# Patient Record
Sex: MALE | Race: WHITE | NOT HISPANIC OR LATINO | Employment: FULL TIME | URBAN - METROPOLITAN AREA
[De-identification: names, ages, dates, MRNs, and addresses within clinical notes are randomized per-mention and may not be internally consistent; named-entity substitution may affect disease eponyms.]

---

## 2019-11-23 ENCOUNTER — OFFICE VISIT (OUTPATIENT)
Dept: URGENT CARE | Facility: CLINIC | Age: 29
End: 2019-11-23
Payer: COMMERCIAL

## 2019-11-23 VITALS
BODY MASS INDEX: 22.96 KG/M2 | WEIGHT: 164 LBS | HEIGHT: 71 IN | OXYGEN SATURATION: 97 % | TEMPERATURE: 98.4 F | SYSTOLIC BLOOD PRESSURE: 119 MMHG | RESPIRATION RATE: 16 BRPM | HEART RATE: 103 BPM | DIASTOLIC BLOOD PRESSURE: 75 MMHG

## 2019-11-23 DIAGNOSIS — L21.9 SEBORRHEIC DERMATITIS: Primary | ICD-10-CM

## 2019-11-23 PROCEDURE — 99203 OFFICE O/P NEW LOW 30 MIN: CPT | Performed by: PHYSICIAN ASSISTANT

## 2019-11-23 RX ORDER — SERTRALINE HYDROCHLORIDE 100 MG/1
100 TABLET, FILM COATED ORAL DAILY
COMMUNITY

## 2019-11-23 NOTE — PROGRESS NOTES
[employer] [facility]        NAME: Basil Lennon is a 30 y o  male  : 1990    MRN: [id number]  DATE: 2019  TIME: 12:10 PM    Assessment and Plan   Seborrheic dermatitis [L21 9]  1  Seborrheic dermatitis           Patient Instructions     Discussed condition with pt  Resembles seborrheic dermatitis of the eyelids vs contact dermatitis to unknown trigger  He has no significant periorbital edema  I rec antihistamine for the itching, Selsun Blue gentle scrubs 2-3 times weekly, using pure soap on the face, avoiding recent new facial wash, and avoiding excessively hot water in the shower  Should condition persist/worsen, then he should be reevaluated  Follow up with PCP in 3-5 days  Proceed to  ER if symptoms worsen  Chief Complaint     Chief Complaint   Patient presents with    Eye Problem      elijah orbial  edema of left eye dry scaling redness rt eye with skin peeling  got zylene in  rt eye 9 days ago also had allergy to dog dander          History of Present Illness       Pt presents with a several day history of what he reports is redness, itching, irritation, and scaly skin over his eyelids  He denies any eye drainage or redness, crusting, photophobia, visual changes  He reports about one month ago, he used a new facial wash but has otherwise not changed anything in his environment  He has not tried taking anything or applying anything topically to this point  Review of Systems   Review of Systems   Constitutional: Negative  Eyes:        Red, dry, scaly, itchy eyelids    Respiratory: Negative  Cardiovascular: Negative  Gastrointestinal: Negative  Genitourinary: Negative            Current Medications       Current Outpatient Medications:     sertraline (ZOLOFT) 100 mg tablet, Take 100 mg by mouth daily, Disp: , Rfl:     Current Allergies     Allergies as of 2019    (No Known Allergies)            The following portions of the patient's history were reviewed and updated as appropriate: allergies, current medications, past family history, past medical history, past social history, past surgical history and problem list      No past medical history on file  No past surgical history on file  No family history on file  Medications have been verified  Objective   /75   Pulse 103   Temp 98 4 °F (36 9 °C)   Resp 16   Ht 5' 10 5" (1 791 m)   Wt 74 4 kg (164 lb)   SpO2 97%   BMI 23 20 kg/m²        Physical Exam     Physical Exam   Constitutional: He is oriented to person, place, and time  He appears well-developed and well-nourished  No distress  Eyes:   B/L upper and lower lids are dry, scaly, red, with peeling skin but no significant periorbital edema noted  Conjunctiva WNL  No crusting on lid margins  No photophobia  Neurological: He is alert and oriented to person, place, and time  Psychiatric: He has a normal mood and affect  Vitals reviewed

## 2022-04-29 ENCOUNTER — HOSPITAL ENCOUNTER (EMERGENCY)
Facility: HOSPITAL | Age: 32
Discharge: HOME | End: 2022-04-30
Attending: EMERGENCY MEDICINE
Payer: COMMERCIAL

## 2022-04-29 DIAGNOSIS — F10.929 ACUTE ALCOHOL INTOXICATION (HCC): ICD-10-CM

## 2022-04-29 DIAGNOSIS — R45.851 SUICIDAL IDEATION: Primary | ICD-10-CM

## 2022-04-29 LAB
ALBUMIN SERPL BCP-MCNC: 4 G/DL (ref 3.5–5)
ALP SERPL-CCNC: 80 U/L (ref 46–116)
ALT SERPL W P-5'-P-CCNC: 28 U/L (ref 12–78)
AMPHETAMINES SERPL QL SCN: NEGATIVE
ANION GAP SERPL CALCULATED.3IONS-SCNC: 13 MMOL/L (ref 4–13)
APAP SERPL-MCNC: <2 UG/ML (ref 10–20)
AST SERPL W P-5'-P-CCNC: 24 U/L (ref 5–45)
BARBITURATES UR QL: NEGATIVE
BASOPHILS # BLD AUTO: 0.04 THOUSANDS/ΜL (ref 0–0.1)
BASOPHILS NFR BLD AUTO: 1 % (ref 0–1)
BENZODIAZ UR QL: NEGATIVE
BILIRUB SERPL-MCNC: 0.21 MG/DL (ref 0.2–1)
BILIRUB UR QL STRIP: NEGATIVE
BUN SERPL-MCNC: 12 MG/DL (ref 5–25)
CALCIUM SERPL-MCNC: 8.4 MG/DL (ref 8.3–10.1)
CHLORIDE SERPL-SCNC: 107 MMOL/L (ref 100–108)
CLARITY UR: CLEAR
CO2 SERPL-SCNC: 22 MMOL/L (ref 21–32)
COCAINE UR QL: NEGATIVE
COLOR UR: COLORLESS
CREAT SERPL-MCNC: 1.21 MG/DL (ref 0.6–1.3)
EOSINOPHIL # BLD AUTO: 0.06 THOUSAND/ΜL (ref 0–0.61)
EOSINOPHIL NFR BLD AUTO: 1 % (ref 0–6)
ERYTHROCYTE [DISTWIDTH] IN BLOOD BY AUTOMATED COUNT: 13 % (ref 11.6–15.1)
ETHANOL SERPL-MCNC: 319 MG/DL (ref 0–3)
GFR SERPL CREATININE-BSD FRML MDRD: 78 ML/MIN/1.73SQ M
GLUCOSE SERPL-MCNC: 86 MG/DL (ref 65–140)
GLUCOSE UR STRIP-MCNC: NEGATIVE MG/DL
HCT VFR BLD AUTO: 43.1 % (ref 36.5–49.3)
HGB BLD-MCNC: 15 G/DL (ref 12–17)
HGB UR QL STRIP.AUTO: NEGATIVE
IMM GRANULOCYTES # BLD AUTO: 0.01 THOUSAND/UL (ref 0–0.2)
IMM GRANULOCYTES NFR BLD AUTO: 0 % (ref 0–2)
KETONES UR STRIP-MCNC: NEGATIVE MG/DL
LEUKOCYTE ESTERASE UR QL STRIP: NEGATIVE
LYMPHOCYTES # BLD AUTO: 2.65 THOUSANDS/ΜL (ref 0.6–4.47)
LYMPHOCYTES NFR BLD AUTO: 36 % (ref 14–44)
MCH RBC QN AUTO: 31.4 PG (ref 26.8–34.3)
MCHC RBC AUTO-ENTMCNC: 34.8 G/DL (ref 31.4–37.4)
MCV RBC AUTO: 90 FL (ref 82–98)
METHADONE UR QL: NEGATIVE
MONOCYTES # BLD AUTO: 0.55 THOUSAND/ΜL (ref 0.17–1.22)
MONOCYTES NFR BLD AUTO: 7 % (ref 4–12)
NEUTROPHILS # BLD AUTO: 4.08 THOUSANDS/ΜL (ref 1.85–7.62)
NEUTS SEG NFR BLD AUTO: 55 % (ref 43–75)
NITRITE UR QL STRIP: NEGATIVE
NRBC BLD AUTO-RTO: 0 /100 WBCS
OPIATES UR QL SCN: NEGATIVE
OXYCODONE+OXYMORPHONE UR QL SCN: NEGATIVE
PCP UR QL: NEGATIVE
PH UR STRIP.AUTO: 5.5 [PH]
PLATELET # BLD AUTO: 235 THOUSANDS/UL (ref 149–390)
PMV BLD AUTO: 9.3 FL (ref 8.9–12.7)
POTASSIUM SERPL-SCNC: 3.8 MMOL/L (ref 3.5–5.3)
PROT SERPL-MCNC: 7.7 G/DL (ref 6.4–8.2)
PROT UR STRIP-MCNC: NEGATIVE MG/DL
RBC # BLD AUTO: 4.78 MILLION/UL (ref 3.88–5.62)
SODIUM SERPL-SCNC: 142 MMOL/L (ref 136–145)
SP GR UR STRIP.AUTO: <=1.005 (ref 1–1.03)
THC UR QL: NEGATIVE
UROBILINOGEN UR QL STRIP.AUTO: 0.2 E.U./DL
WBC # BLD AUTO: 7.39 THOUSAND/UL (ref 4.31–10.16)

## 2022-04-29 PROCEDURE — 85025 COMPLETE CBC W/AUTO DIFF WBC: CPT | Performed by: EMERGENCY MEDICINE

## 2022-04-29 PROCEDURE — 80053 COMPREHEN METABOLIC PANEL: CPT | Performed by: EMERGENCY MEDICINE

## 2022-04-29 PROCEDURE — 36415 COLL VENOUS BLD VENIPUNCTURE: CPT

## 2022-04-29 PROCEDURE — 82077 ASSAY SPEC XCP UR&BREATH IA: CPT | Performed by: EMERGENCY MEDICINE

## 2022-04-29 PROCEDURE — 80143 DRUG ASSAY ACETAMINOPHEN: CPT | Performed by: EMERGENCY MEDICINE

## 2022-04-29 PROCEDURE — 99285 EMERGENCY DEPT VISIT HI MDM: CPT

## 2022-04-29 PROCEDURE — 80307 DRUG TEST PRSMV CHEM ANLYZR: CPT | Performed by: EMERGENCY MEDICINE

## 2022-04-29 PROCEDURE — 99285 EMERGENCY DEPT VISIT HI MDM: CPT | Performed by: EMERGENCY MEDICINE

## 2022-04-30 VITALS
TEMPERATURE: 97.8 F | WEIGHT: 181.6 LBS | HEART RATE: 80 BPM | DIASTOLIC BLOOD PRESSURE: 52 MMHG | RESPIRATION RATE: 20 BRPM | SYSTOLIC BLOOD PRESSURE: 95 MMHG | BODY MASS INDEX: 25.69 KG/M2 | OXYGEN SATURATION: 97 %

## 2022-04-30 NOTE — ED NOTES
Patient provided a ginger ale as requested, calm and cooperative     Courtney Newton, SALVADOR  04/30/22 7315

## 2022-04-30 NOTE — ED NOTES
Pt is a 33 y o  male  Who was brought to the ED by his mother per pt's request   Pt was intoxicated and stated "I just want to fucking die " Pt held overnight to sober up and await crisis eval for possible SI  Pt presented as calm and cooperative  He denied SI/HI/Hallucinations  He stated "I was really drunk and upset, of course I said I wanted to die, but I don't want to kill myself, and I don't really want to die  I just felt like shit  People say that all the time "  Pt stated that he has been living in a sober living home in [place], [place]  He had come to spend the weekend with his parents  Pt reported that he has been struggling with alcoholism for the past 13 years but is "only a binge drinker " Pt states he has been hospitalized in the past for detox only and never for psychiatric issues  Attempted collateral information from pt's mother but was unable to get through  Pt does not appear to be an immediate risk to himself nor others at this time and is therefore cleared for d/c by Maria E Alcantar MA  Crisis Intervention Worker  04/30/22 11:41 AM

## 2022-04-30 NOTE — ED CARE HANDOFF
Emergency Department Sign Out Note        Sign out and transfer of care from [facility]  See Separate Emergency Department note  The patient, Nayeli Phillips, was evaluated by the previous provider for alcohol intox and suicidality  Workup Completed:  Results Reviewed     Procedure Component Value Units Date/Time    Acetaminophen level-"If concentration is detectable, please discuss with medical  on call " [[id number]]  (Abnormal) Collected: 04/29/22 2022    Lab Status: Final result Specimen: Blood from Arm, Right Updated: 04/29/22 2100     Acetaminophen Level <2 ug/mL     Rapid drug screen, urine [[id number]]  (Normal) Collected: 04/29/22 2023    Lab Status: Final result Specimen: Urine, Clean Catch Updated: 04/29/22 2046     Amph/Meth UR Negative     Barbiturate Ur Negative     Benzodiazepine Urine Negative     Cocaine Urine Negative     Methadone Urine Negative     Opiate Urine Negative     PCP Ur Negative     THC Urine Negative     Oxycodone Urine Negative    Narrative:      FOR MEDICAL PURPOSES ONLY  IF CONFIRMATION NEEDED PLEASE CONTACT THE LAB WITHIN 5 DAYS      Drug Screen Cutoff Levels:  AMPHETAMINE/METHAMPHETAMINES  1000 ng/mL  BARBITURATES     200 ng/mL  BENZODIAZEPINES     200 ng/mL  COCAINE      300 ng/mL  METHADONE      300 ng/mL  OPIATES      300 ng/mL  PHENCYCLIDINE     25 ng/mL  THC       50 ng/mL  OXYCODONE      100 ng/mL    Ethanol [[id number]]  (Abnormal) Collected: 04/29/22 2022    Lab Status: Final result Specimen: Blood from Arm, Right Updated: 04/29/22 2046     Ethanol Lvl 319 mg/dL     Comprehensive metabolic panel [[id number]] Collected: 04/29/22 2022    Lab Status: Final result Specimen: Blood from Arm, Right Updated: 04/29/22 2045     Sodium 142 mmol/L      Potassium 3 8 mmol/L      Chloride 107 mmol/L      CO2 22 mmol/L      ANION GAP 13 mmol/L      BUN 12 mg/dL      Creatinine 1 21 mg/dL      Glucose 86 mg/dL      Calcium 8 4 mg/dL      AST 24 U/L      ALT 28 U/L      Alkaline Phosphatase 80 U/L      Total Protein 7 7 g/dL      Albumin 4 0 g/dL      Total Bilirubin 0 21 mg/dL      eGFR 78 ml/min/1 73sq m     Narrative:      [employer] guidelines for Chronic Kidney Disease (CKD):     Stage 1 with normal or high GFR (GFR > 90 mL/min/1 73 square meters)    Stage 2 Mild CKD (GFR = 60-89 mL/min/1 73 square meters)    Stage 3A Moderate CKD (GFR = 45-59 mL/min/1 73 square meters)    Stage 3B Moderate CKD (GFR = 30-44 mL/min/1 73 square meters)    Stage 4 Severe CKD (GFR = 15-29 mL/min/1 73 square meters)    Stage 5 End Stage CKD (GFR <15 mL/min/1 73 square meters)  Note: GFR calculation is accurate only with a steady state creatinine    UA (URINE) with reflex to Scope [[id number]] Collected: 04/29/22 2023    Lab Status: Final result Specimen: Urine, Clean Catch Updated: 04/29/22 2030     Color, UA Colorless     Clarity, UA Clear     Specific Gravity, UA <=1 005     pH, UA 5 5     Leukocytes, UA Negative     Nitrite, UA Negative     Protein, UA Negative mg/dl      Glucose, UA Negative mg/dl      Ketones, UA Negative mg/dl      Urobilinogen, UA 0 2 E U /dl      Bilirubin, UA Negative     Blood, UA Negative    CBC and differential [[id number]] Collected: 04/29/22 2022    Lab Status: Final result Specimen: Blood from Arm, Right Updated: 04/29/22 2030     WBC 7 39 Thousand/uL      RBC 4 78 Million/uL      Hemoglobin 15 0 g/dL      Hematocrit 43 1 %      MCV 90 fL      MCH 31 4 pg      MCHC 34 8 g/dL      RDW 13 0 %      MPV 9 3 fL      Platelets 123 Thousands/uL      nRBC 0 /100 WBCs      Neutrophils Relative 55 %      Immat GRANS % 0 %      Lymphocytes Relative 36 %      Monocytes Relative 7 %      Eosinophils Relative 1 %      Basophils Relative 1 %      Neutrophils Absolute 4 08 Thousands/µL      Immature Grans Absolute 0 01 Thousand/uL      Lymphocytes Absolute 2 65 Thousands/µL      Monocytes Absolute 0 55 Thousand/µL      Eosinophils Absolute 0 06 Thousand/µL Basophils Absolute 0 04 Thousands/µL             No orders to display         ED Course / Workup Pending (followup): Patient sobered through the night  I talk to him in the morning  He says SI usually accompanies alcohol intox  He reports he can't stop drinking  He's still willing to speak to a crisis worker  ED Course as of 04/30/22 0654   Fri Apr 29, 2022   2228 Sober and d/c vs crisis   Sat Apr 30, 2022   3314 FG called     Procedures  MDM    Patient is medically clear for psychiatric evaluation    Disposition  Final diagnoses:   None     ED Disposition     None      Follow-up Information    None       Patient's Medications   Discharge Prescriptions    No medications on file     No discharge procedures on file         ED Provider  Electronically Signed by     Lawanna Apley, MD  04/30/22 200 [facility] Angelina Bell MD  04/30/22 4014

## 2022-04-30 NOTE — ED CARE HANDOFF
Emergency Department Sign Out Note        Sign out and transfer of care from previous provider  Alcohol sobriety  Crisis evaluated the patient,denied any suicidal or self harm ideation or intention  Was AAOX4 and competent  Declined any services for alcohol rehab  Discharged  Procedures  MDM        Disposition  Final diagnoses:   Suicidal ideation   Acute alcohol intoxication ([address] )     Time reflects when diagnosis was documented in both MDM as applicable and the Disposition within this note     Time User Action Codes Description Comment    4/30/2022  9:55 AM Anepu, Waldron Dubin Add [R45 851] Suicidal ideation     4/30/2022  9:55 AM Anepu, Waldron Dubin Add [F10 929] Acute alcohol intoxication [facility])       ED Disposition     ED Disposition Condition Date/Time Comment    Discharge Stable Sat Apr 30, 2022  9:55 AM Basil Lennon discharge to home/self care  Follow-up Information     Follow up With Specialties Details Why Contact Info Additional Information    Mallika Gallagher MD  Schedule an appointment as soon as possible for a visit   [address] Emergency Department Emergency Medicine  If symptoms worsen [address] [place] [address] Emergency Department, [place], [place], [address]        Patient's Medications   Discharge Prescriptions    No medications on file     No discharge procedures on file         ED Provider  Electronically Signed by     Manpreet Payne DO  04/30/22 5578

## 2022-04-30 NOTE — ED NOTES
Pt resting in bed VSS no medical complaints at this time  When asked if he was suicidal or any thoughts of harming himself pt responded "I don't know" pt cooperatively ambulated to secure holding area   1:1 watch remains in place for safety      Ana Clements RN  04/29/22 4503

## 2022-04-30 NOTE — ED PROVIDER NOTES
History  Chief Complaint   Patient presents with    Alcohol Intoxication     Patient in bathroom , waited outside door until patient exits and ambulates to triage  Patient had a vodka bottle in his jacket that was leaking onto floor  He states his mother dropped him off, that he was sober for 6 days living in a " sober house "  States that was his third bottle of vodka today  Crying " I just want to fucking die "  Patient is taken to hallway bed 1 after vodka bottle leaking   Psychiatric Evaluation     68-year-old male presents stating that he is suicidal that his life is not going well however he also was intoxicated  States he has been drinking all afternoon  No fevers chills no other complaints  No other complaints patient states he has been this room many times and is familiar with this way it works  History provided by:  Patient   used: No        Prior to Admission Medications   Prescriptions Last Dose Informant Patient Reported? Taking?   sertraline (ZOLOFT) 100 mg tablet   Yes No   Sig: Take 100 mg by mouth daily      Facility-Administered Medications: None       Past Medical History:   Diagnosis Date    ETOH abuse        History reviewed  No pertinent surgical history  History reviewed  No pertinent family history  I have reviewed and agree with the history as documented  E-Cigarette/Vaping    E-Cigarette Use Never User      E-Cigarette/Vaping Substances     Social History     Tobacco Use    Smoking status: Current Some Day Smoker    Smokeless tobacco: Never Used   Vaping Use    Vaping Use: Never used   Substance Use Topics    Alcohol use: Yes     Alcohol/week: 6 0 standard drinks     Types: 2 Glasses of wine, 4 Cans of beer per week     Comment: 2 pints of vodka or more with binge drinking     Drug use: Never       Review of Systems   Constitutional: Negative for activity change, chills, diaphoresis and fever     HENT: Negative for congestion, ear pain, nosebleeds, sore throat, trouble swallowing and voice change  Eyes: Negative for pain, discharge and redness  Respiratory: Negative for apnea, cough, choking, shortness of breath, wheezing and stridor  Cardiovascular: Negative for chest pain and palpitations  Gastrointestinal: Negative for abdominal distention, abdominal pain, constipation, diarrhea, nausea and vomiting  Endocrine: Negative for polydipsia  Genitourinary: Negative for difficulty urinating, dysuria, flank pain, frequency, hematuria and urgency  Musculoskeletal: Negative for back pain, gait problem, joint swelling, myalgias, neck pain and neck stiffness  Skin: Negative for pallor and rash  Neurological: Negative for dizziness, tremors, syncope, speech difficulty, weakness, numbness and headaches  Hematological: Negative for adenopathy  Psychiatric/Behavioral: Positive for suicidal ideas  Negative for confusion, hallucinations and self-injury  The patient is not nervous/anxious  Physical Exam  Physical Exam  Vitals and nursing note reviewed  Constitutional:       General: He is not in acute distress  Appearance: He is well-developed  He is not diaphoretic  HENT:      Head: Normocephalic and atraumatic  Right Ear: External ear normal       Left Ear: External ear normal       Nose: Nose normal    Eyes:      Conjunctiva/sclera: Conjunctivae normal       Pupils: Pupils are equal, round, and reactive to light  Cardiovascular:      Rate and Rhythm: Normal rate and regular rhythm  Heart sounds: Normal heart sounds  Pulmonary:      Effort: Pulmonary effort is normal       Breath sounds: Normal breath sounds  Abdominal:      General: Bowel sounds are normal       Palpations: Abdomen is soft  Musculoskeletal:         General: Normal range of motion  Cervical back: Normal range of motion and neck supple  Skin:     General: Skin is warm and dry     Neurological:      Mental Status: He is alert and oriented to person, place, and time  Deep Tendon Reflexes: Reflexes are normal and symmetric  Psychiatric:         Behavior: Behavior is cooperative  Vital Signs  ED Triage Vitals [04/29/22 1957]   Temperature Pulse Respirations Blood Pressure SpO2   (!) 97 2 °F (36 2 °C) 103 20 134/73 98 %      Temp Source Heart Rate Source Patient Position - Orthostatic VS BP Location FiO2 (%)   Tympanic Monitor Lying Right arm --      Pain Score       --           Vitals:    04/29/22 1957 04/29/22 2108 04/30/22 0624 04/30/22 0909   BP: 134/73 121/56 106/64 95/52   Pulse: 103 92 72 80   Patient Position - Orthostatic VS: Lying Lying  Lying         Visual Acuity      ED Medications  Medications - No data to display    Diagnostic Studies  Results Reviewed     Procedure Component Value Units Date/Time    Acetaminophen level-"If concentration is detectable, please discuss with medical  on call " [[id number]]  (Abnormal) Collected: 04/29/22 2022    Lab Status: Final result Specimen: Blood from Arm, Right Updated: 04/29/22 2100     Acetaminophen Level <2 ug/mL     Rapid drug screen, urine [[id number]]  (Normal) Collected: 04/29/22 2023    Lab Status: Final result Specimen: Urine, Clean Catch Updated: 04/29/22 2046     Amph/Meth UR Negative     Barbiturate Ur Negative     Benzodiazepine Urine Negative     Cocaine Urine Negative     Methadone Urine Negative     Opiate Urine Negative     PCP Ur Negative     THC Urine Negative     Oxycodone Urine Negative    Narrative:      FOR MEDICAL PURPOSES ONLY  IF CONFIRMATION NEEDED PLEASE CONTACT THE LAB WITHIN 5 DAYS      Drug Screen Cutoff Levels:  AMPHETAMINE/METHAMPHETAMINES  1000 ng/mL  BARBITURATES     200 ng/mL  BENZODIAZEPINES     200 ng/mL  COCAINE      300 ng/mL  METHADONE      300 ng/mL  OPIATES      300 ng/mL  PHENCYCLIDINE     25 ng/mL  THC       50 ng/mL  OXYCODONE      100 ng/mL    Ethanol [[id number]]  (Abnormal) Collected: 04/29/22 2022    Lab Status: Final result Specimen: Blood from Arm, Right Updated: 04/29/22 2046     Ethanol Lvl 319 mg/dL     Comprehensive metabolic panel [[id number]] Collected: 04/29/22 2022    Lab Status: Final result Specimen: Blood from Arm, Right Updated: 04/29/22 2045     Sodium 142 mmol/L      Potassium 3 8 mmol/L      Chloride 107 mmol/L      CO2 22 mmol/L      ANION GAP 13 mmol/L      BUN 12 mg/dL      Creatinine 1 21 mg/dL      Glucose 86 mg/dL      Calcium 8 4 mg/dL      AST 24 U/L      ALT 28 U/L      Alkaline Phosphatase 80 U/L      Total Protein 7 7 g/dL      Albumin 4 0 g/dL      Total Bilirubin 0 21 mg/dL      eGFR 78 ml/min/1 73sq m     Narrative:      [place] guidelines for Chronic Kidney Disease (CKD):     Stage 1 with normal or high GFR (GFR > 90 mL/min/1 73 square meters)    Stage 2 Mild CKD (GFR = 60-89 mL/min/1 73 square meters)    Stage 3A Moderate CKD (GFR = 45-59 mL/min/1 73 square meters)    Stage 3B Moderate CKD (GFR = 30-44 mL/min/1 73 square meters)    Stage 4 Severe CKD (GFR = 15-29 mL/min/1 73 square meters)    Stage 5 End Stage CKD (GFR <15 mL/min/1 73 square meters)  Note: GFR calculation is accurate only with a steady state creatinine    UA (URINE) with reflex to Scope [[id number]] Collected: 04/29/22 2023    Lab Status: Final result Specimen: Urine, Clean Catch Updated: 04/29/22 2030     Color, UA Colorless     Clarity, UA Clear     Specific Gravity, UA <=1 005     pH, UA 5 5     Leukocytes, UA Negative     Nitrite, UA Negative     Protein, UA Negative mg/dl      Glucose, UA Negative mg/dl      Ketones, UA Negative mg/dl      Urobilinogen, UA 0 2 E U /dl      Bilirubin, UA Negative     Blood, UA Negative    CBC and differential [[id number]] Collected: 04/29/22 2022    Lab Status: Final result Specimen: Blood from Arm, Right Updated: 04/29/22 2030     WBC 7 39 Thousand/uL      RBC 4 78 Million/uL      Hemoglobin 15 0 g/dL      Hematocrit 43 1 %      MCV 90 fL      MCH 31 4 pg MCHC 34 8 g/dL      RDW 13 0 %      MPV 9 3 fL      Platelets 833 Thousands/uL      nRBC 0 /100 WBCs      Neutrophils Relative 55 %      Immat GRANS % 0 %      Lymphocytes Relative 36 %      Monocytes Relative 7 %      Eosinophils Relative 1 %      Basophils Relative 1 %      Neutrophils Absolute 4 08 Thousands/µL      Immature Grans Absolute 0 01 Thousand/uL      Lymphocytes Absolute 2 65 Thousands/µL      Monocytes Absolute 0 55 Thousand/µL      Eosinophils Absolute 0 06 Thousand/µL      Basophils Absolute 0 04 Thousands/µL                  No orders to display              Procedures  Procedures         ED Course                                             MDM    Disposition  Final diagnoses:   Suicidal ideation   Acute alcohol intoxication ([facility] [address] )     Time reflects when diagnosis was documented in both MDM as applicable and the Disposition within this note     Time User Action Codes Description Comment    4/30/2022  9:55 AM Denice Thornton Add [R45 851] Suicidal ideation     4/30/2022  9:55 AM Anepu, Mattie Apley Add [F10 929] Acute alcohol intoxication [facility])       ED Disposition     ED Disposition Condition Date/Time Comment    Discharge Stable Sat Apr 30, 2022  9:55 AM Adonis Goldmann discharge to home/self care              Follow-up Information     Follow up With Specialties Details Why Contact Info Additional Information    Enrike Nowak MD  Schedule an appointment as soon as possible for a visit   [address] Emergency Department Emergency Medicine  If symptoms worsen [address] [address] Emergency Department, [facility], [facility]          Discharge Medication List as of 4/30/2022  9:56 AM      CONTINUE these medications which have NOT CHANGED    Details   sertraline (ZOLOFT) 100 mg tablet Take 100 mg by mouth daily, Historical Med             No discharge procedures on file      PDMP Review     None          ED Provider  Electronically Signed by           Ivett Henriquez DO  05/02/22 0848

## 2023-06-13 ENCOUNTER — APPOINTMENT (EMERGENCY)
Dept: CT IMAGING | Facility: HOSPITAL | Age: 33
DRG: 754 | End: 2023-06-13
Payer: COMMERCIAL

## 2023-06-13 ENCOUNTER — HOSPITAL ENCOUNTER (INPATIENT)
Facility: HOSPITAL | Age: 33
LOS: 3 days | DRG: 754 | End: 2023-06-17
Attending: SURGERY | Admitting: INTERNAL MEDICINE
Payer: COMMERCIAL

## 2023-06-13 DIAGNOSIS — F32.A ANXIETY AND DEPRESSION: ICD-10-CM

## 2023-06-13 DIAGNOSIS — F41.9 ANXIETY AND DEPRESSION: ICD-10-CM

## 2023-06-13 DIAGNOSIS — T14.91XA SUICIDE ATTEMPT (HCC): Primary | ICD-10-CM

## 2023-06-13 DIAGNOSIS — T78.2XXA ANAPHYLAXIS, INITIAL ENCOUNTER: ICD-10-CM

## 2023-06-13 DIAGNOSIS — T07.XXXA ABRASIONS OF MULTIPLE SITES: ICD-10-CM

## 2023-06-13 DIAGNOSIS — F10.929 ALCOHOL INTOXICATION (HCC): ICD-10-CM

## 2023-06-13 LAB
ANION GAP SERPL CALCULATED.3IONS-SCNC: 9 MMOL/L (ref 4–13)
ANION GAP SERPL CALCULATED.3IONS-SCNC: 9 MMOL/L (ref 4–13)
BASOPHILS # BLD AUTO: 0.03 THOUSANDS/ÂΜL (ref 0–0.1)
BASOPHILS # BLD AUTO: 0.03 THOUSANDS/ÂΜL (ref 0–0.1)
BASOPHILS NFR BLD AUTO: 0 % (ref 0–1)
BASOPHILS NFR BLD AUTO: 0 % (ref 0–1)
BUN SERPL-MCNC: 12 MG/DL (ref 5–25)
BUN SERPL-MCNC: 12 MG/DL (ref 5–25)
CALCIUM SERPL-MCNC: 8.6 MG/DL (ref 8.4–10.2)
CALCIUM SERPL-MCNC: 8.6 MG/DL (ref 8.4–10.2)
CHLORIDE SERPL-SCNC: 109 MMOL/L (ref 96–108)
CHLORIDE SERPL-SCNC: 109 MMOL/L (ref 96–108)
CO2 SERPL-SCNC: 25 MMOL/L (ref 21–32)
CO2 SERPL-SCNC: 25 MMOL/L (ref 21–32)
CREAT SERPL-MCNC: 1.28 MG/DL (ref 0.6–1.3)
CREAT SERPL-MCNC: 1.28 MG/DL (ref 0.6–1.3)
EOSINOPHIL # BLD AUTO: 0.12 THOUSAND/ÂΜL (ref 0–0.61)
EOSINOPHIL # BLD AUTO: 0.12 THOUSAND/ÂΜL (ref 0–0.61)
EOSINOPHIL NFR BLD AUTO: 2 % (ref 0–6)
EOSINOPHIL NFR BLD AUTO: 2 % (ref 0–6)
ERYTHROCYTE [DISTWIDTH] IN BLOOD BY AUTOMATED COUNT: 13.3 % (ref 11.6–15.1)
ERYTHROCYTE [DISTWIDTH] IN BLOOD BY AUTOMATED COUNT: 13.3 % (ref 11.6–15.1)
ETHANOL SERPL-MCNC: 227 MG/DL
ETHANOL SERPL-MCNC: 227 MG/DL
GFR SERPL CREATININE-BSD FRML MDRD: 72 ML/MIN/1.73SQ M
GFR SERPL CREATININE-BSD FRML MDRD: 72 ML/MIN/1.73SQ M
GLUCOSE SERPL-MCNC: 84 MG/DL (ref 65–140)
GLUCOSE SERPL-MCNC: 84 MG/DL (ref 65–140)
HCT VFR BLD AUTO: 43.6 % (ref 36.5–49.3)
HCT VFR BLD AUTO: 43.6 % (ref 36.5–49.3)
HGB BLD-MCNC: 15.1 G/DL (ref 12–17)
HGB BLD-MCNC: 15.1 G/DL (ref 12–17)
IMM GRANULOCYTES # BLD AUTO: 0.02 THOUSAND/UL (ref 0–0.2)
IMM GRANULOCYTES # BLD AUTO: 0.02 THOUSAND/UL (ref 0–0.2)
IMM GRANULOCYTES NFR BLD AUTO: 0 % (ref 0–2)
IMM GRANULOCYTES NFR BLD AUTO: 0 % (ref 0–2)
LYMPHOCYTES # BLD AUTO: 2.26 THOUSANDS/ÂΜL (ref 0.6–4.47)
LYMPHOCYTES # BLD AUTO: 2.26 THOUSANDS/ÂΜL (ref 0.6–4.47)
LYMPHOCYTES NFR BLD AUTO: 31 % (ref 14–44)
LYMPHOCYTES NFR BLD AUTO: 31 % (ref 14–44)
MCH RBC QN AUTO: 30.3 PG (ref 26.8–34.3)
MCH RBC QN AUTO: 30.3 PG (ref 26.8–34.3)
MCHC RBC AUTO-ENTMCNC: 34.6 G/DL (ref 31.4–37.4)
MCHC RBC AUTO-ENTMCNC: 34.6 G/DL (ref 31.4–37.4)
MCV RBC AUTO: 88 FL (ref 82–98)
MCV RBC AUTO: 88 FL (ref 82–98)
MONOCYTES # BLD AUTO: 0.48 THOUSAND/ÂΜL (ref 0.17–1.22)
MONOCYTES # BLD AUTO: 0.48 THOUSAND/ÂΜL (ref 0.17–1.22)
MONOCYTES NFR BLD AUTO: 7 % (ref 4–12)
MONOCYTES NFR BLD AUTO: 7 % (ref 4–12)
NEUTROPHILS # BLD AUTO: 4.29 THOUSANDS/ÂΜL (ref 1.85–7.62)
NEUTROPHILS # BLD AUTO: 4.29 THOUSANDS/ÂΜL (ref 1.85–7.62)
NEUTS SEG NFR BLD AUTO: 60 % (ref 43–75)
NEUTS SEG NFR BLD AUTO: 60 % (ref 43–75)
NRBC BLD AUTO-RTO: 0 /100 WBCS
NRBC BLD AUTO-RTO: 0 /100 WBCS
PLATELET # BLD AUTO: 198 THOUSANDS/UL (ref 149–390)
PLATELET # BLD AUTO: 198 THOUSANDS/UL (ref 149–390)
PMV BLD AUTO: 9.3 FL (ref 8.9–12.7)
PMV BLD AUTO: 9.3 FL (ref 8.9–12.7)
POTASSIUM SERPL-SCNC: 4 MMOL/L (ref 3.5–5.3)
POTASSIUM SERPL-SCNC: 4 MMOL/L (ref 3.5–5.3)
RBC # BLD AUTO: 4.98 MILLION/UL (ref 3.88–5.62)
RBC # BLD AUTO: 4.98 MILLION/UL (ref 3.88–5.62)
SODIUM SERPL-SCNC: 143 MMOL/L (ref 135–147)
SODIUM SERPL-SCNC: 143 MMOL/L (ref 135–147)
WBC # BLD AUTO: 7.2 THOUSAND/UL (ref 4.31–10.16)
WBC # BLD AUTO: 7.2 THOUSAND/UL (ref 4.31–10.16)

## 2023-06-13 PROCEDURE — 76705 ECHO EXAM OF ABDOMEN: CPT | Performed by: PHYSICIAN ASSISTANT

## 2023-06-13 PROCEDURE — 70450 CT HEAD/BRAIN W/O DYE: CPT

## 2023-06-13 PROCEDURE — 82947 ASSAY GLUCOSE BLOOD QUANT: CPT

## 2023-06-13 PROCEDURE — 84295 ASSAY OF SERUM SODIUM: CPT

## 2023-06-13 PROCEDURE — 76604 US EXAM CHEST: CPT | Performed by: PHYSICIAN ASSISTANT

## 2023-06-13 PROCEDURE — 99205 OFFICE O/P NEW HI 60 MIN: CPT | Performed by: SURGERY

## 2023-06-13 PROCEDURE — 71260 CT THORAX DX C+: CPT

## 2023-06-13 PROCEDURE — 93308 TTE F-UP OR LMTD: CPT | Performed by: PHYSICIAN ASSISTANT

## 2023-06-13 PROCEDURE — 84132 ASSAY OF SERUM POTASSIUM: CPT

## 2023-06-13 PROCEDURE — 90471 IMMUNIZATION ADMIN: CPT

## 2023-06-13 PROCEDURE — 99285 EMERGENCY DEPT VISIT HI MDM: CPT

## 2023-06-13 PROCEDURE — 82330 ASSAY OF CALCIUM: CPT

## 2023-06-13 PROCEDURE — 72125 CT NECK SPINE W/O DYE: CPT

## 2023-06-13 PROCEDURE — 85014 HEMATOCRIT: CPT

## 2023-06-13 PROCEDURE — 80048 BASIC METABOLIC PNL TOTAL CA: CPT | Performed by: SURGERY

## 2023-06-13 PROCEDURE — 82077 ASSAY SPEC XCP UR&BREATH IA: CPT | Performed by: SURGERY

## 2023-06-13 PROCEDURE — 85025 COMPLETE CBC W/AUTO DIFF WBC: CPT | Performed by: SURGERY

## 2023-06-13 PROCEDURE — 82803 BLOOD GASES ANY COMBINATION: CPT

## 2023-06-13 PROCEDURE — 99291 CRITICAL CARE FIRST HOUR: CPT | Performed by: EMERGENCY MEDICINE

## 2023-06-13 PROCEDURE — 74177 CT ABD & PELVIS W/CONTRAST: CPT

## 2023-06-13 PROCEDURE — 36415 COLL VENOUS BLD VENIPUNCTURE: CPT | Performed by: SURGERY

## 2023-06-13 RX ADMIN — IOHEXOL 100 ML: 350 INJECTION, SOLUTION INTRAVENOUS at 23:17

## 2023-06-13 NOTE — LETTER
[facility]  FLOOR MED SURG UNIT  [address] [facility] [address]  Dept: [phone]    June 22, 2023     Patient: Martha Soni   YOB: 1990   Date of Visit: 6/13/2023       To Whom it May Concern:    Sonja Lamar is under my professional care  He was seen in the hospital from 6/13/2023 to 6/17/2023  He may return to school on 6/23/2023 without limitations  If you have any questions or concerns, please don't hesitate to call           Sincerely,          Drew Hyman, DO

## 2023-06-14 PROBLEM — T50.995A: Status: ACTIVE | Noted: 2023-06-14

## 2023-06-14 PROBLEM — F32.A ANXIETY AND DEPRESSION: Status: ACTIVE | Noted: 2023-06-14

## 2023-06-14 PROBLEM — F10.20 ETOH DEPENDENCE (HCC): Status: ACTIVE | Noted: 2023-06-14

## 2023-06-14 PROBLEM — F41.9 ANXIETY AND DEPRESSION: Status: ACTIVE | Noted: 2023-06-14

## 2023-06-14 LAB
AMPHETAMINES SERPL QL SCN: NEGATIVE
AMPHETAMINES SERPL QL SCN: NEGATIVE
BARBITURATES UR QL: NEGATIVE
BARBITURATES UR QL: NEGATIVE
BENZODIAZ UR QL: NEGATIVE
BENZODIAZ UR QL: NEGATIVE
COCAINE UR QL: NEGATIVE
COCAINE UR QL: NEGATIVE
ETHANOL EXG-MCNC: 0.11 MG/DL
ETHANOL EXG-MCNC: 0.11 MG/DL
HCT VFR BLD AUTO: 37.6 % (ref 36.5–49.3)
HCT VFR BLD AUTO: 37.6 % (ref 36.5–49.3)
HCT VFR BLD AUTO: 42.1 % (ref 36.5–49.3)
HCT VFR BLD AUTO: 42.1 % (ref 36.5–49.3)
HGB BLD-MCNC: 12.7 G/DL (ref 12–17)
HGB BLD-MCNC: 12.7 G/DL (ref 12–17)
HGB BLD-MCNC: 14.4 G/DL (ref 12–17)
HGB BLD-MCNC: 14.4 G/DL (ref 12–17)
HOLD SPECIMEN: NORMAL
HOLD SPECIMEN: NORMAL
METHADONE UR QL: NEGATIVE
METHADONE UR QL: NEGATIVE
OPIATES UR QL SCN: NEGATIVE
OPIATES UR QL SCN: NEGATIVE
OXYCODONE+OXYMORPHONE UR QL SCN: NEGATIVE
OXYCODONE+OXYMORPHONE UR QL SCN: NEGATIVE
PCP UR QL: NEGATIVE
PCP UR QL: NEGATIVE
THC UR QL: NEGATIVE
THC UR QL: NEGATIVE

## 2023-06-14 PROCEDURE — 96375 TX/PRO/DX INJ NEW DRUG ADDON: CPT

## 2023-06-14 PROCEDURE — 96361 HYDRATE IV INFUSION ADD-ON: CPT

## 2023-06-14 PROCEDURE — 99232 SBSQ HOSP IP/OBS MODERATE 35: CPT | Performed by: INTERNAL MEDICINE

## 2023-06-14 PROCEDURE — 90715 TDAP VACCINE 7 YRS/> IM: CPT | Performed by: PHYSICIAN ASSISTANT

## 2023-06-14 PROCEDURE — 96372 THER/PROPH/DIAG INJ SC/IM: CPT

## 2023-06-14 PROCEDURE — 36415 COLL VENOUS BLD VENIPUNCTURE: CPT | Performed by: EMERGENCY MEDICINE

## 2023-06-14 PROCEDURE — 80307 DRUG TEST PRSMV CHEM ANLYZR: CPT | Performed by: EMERGENCY MEDICINE

## 2023-06-14 PROCEDURE — 85018 HEMOGLOBIN: CPT | Performed by: EMERGENCY MEDICINE

## 2023-06-14 PROCEDURE — NC001 PR NO CHARGE

## 2023-06-14 PROCEDURE — 82075 ASSAY OF BREATH ETHANOL: CPT | Performed by: EMERGENCY MEDICINE

## 2023-06-14 PROCEDURE — 96374 THER/PROPH/DIAG INJ IV PUSH: CPT

## 2023-06-14 PROCEDURE — NC001 PR NO CHARGE: Performed by: EMERGENCY MEDICINE

## 2023-06-14 PROCEDURE — 85014 HEMATOCRIT: CPT | Performed by: EMERGENCY MEDICINE

## 2023-06-14 RX ORDER — CHLORHEXIDINE GLUCONATE 0.12 MG/ML
15 RINSE ORAL EVERY 12 HOURS SCHEDULED
Status: DISCONTINUED | OUTPATIENT
Start: 2023-06-14 | End: 2023-06-17 | Stop reason: HOSPADM

## 2023-06-14 RX ORDER — BUSPIRONE HYDROCHLORIDE 10 MG/1
10 TABLET ORAL 3 TIMES DAILY
COMMUNITY

## 2023-06-14 RX ORDER — GABAPENTIN 600 MG/1
600 TABLET ORAL 3 TIMES DAILY
COMMUNITY

## 2023-06-14 RX ORDER — QUETIAPINE FUMARATE 50 MG/1
50 TABLET, FILM COATED ORAL
Status: ON HOLD | COMMUNITY
End: 2023-06-16 | Stop reason: SDUPTHER

## 2023-06-14 RX ORDER — SERTRALINE HYDROCHLORIDE 100 MG/1
100 TABLET, FILM COATED ORAL DAILY
COMMUNITY

## 2023-06-14 RX ORDER — SERTRALINE HYDROCHLORIDE 20 MG/ML
100 SOLUTION ORAL DAILY
Status: DISCONTINUED | OUTPATIENT
Start: 2023-06-14 | End: 2023-06-15

## 2023-06-14 RX ORDER — SODIUM CHLORIDE 9 MG/ML
150 INJECTION, SOLUTION INTRAVENOUS CONTINUOUS
Status: DISCONTINUED | OUTPATIENT
Start: 2023-06-14 | End: 2023-06-16

## 2023-06-14 RX ORDER — ENOXAPARIN SODIUM 100 MG/ML
40 INJECTION SUBCUTANEOUS DAILY
Status: DISCONTINUED | OUTPATIENT
Start: 2023-06-14 | End: 2023-06-17 | Stop reason: HOSPADM

## 2023-06-14 RX ORDER — EPINEPHRINE 1 MG/ML
0.5 INJECTION, SOLUTION, CONCENTRATE INTRAVENOUS ONCE
Status: COMPLETED | OUTPATIENT
Start: 2023-06-14 | End: 2023-06-14

## 2023-06-14 RX ORDER — QUETIAPINE FUMARATE 25 MG/1
50 TABLET, FILM COATED ORAL
Status: DISCONTINUED | OUTPATIENT
Start: 2023-06-14 | End: 2023-06-17 | Stop reason: HOSPADM

## 2023-06-14 RX ORDER — BUPROPION HYDROCHLORIDE 150 MG/1
300 TABLET ORAL DAILY
Status: DISCONTINUED | OUTPATIENT
Start: 2023-06-14 | End: 2023-06-17 | Stop reason: HOSPADM

## 2023-06-14 RX ORDER — FAMOTIDINE 10 MG/ML
20 INJECTION, SOLUTION INTRAVENOUS ONCE
Status: COMPLETED | OUTPATIENT
Start: 2023-06-14 | End: 2023-06-14

## 2023-06-14 RX ORDER — GINSENG 100 MG
1 CAPSULE ORAL 2 TIMES DAILY
Status: DISCONTINUED | OUTPATIENT
Start: 2023-06-14 | End: 2023-06-17 | Stop reason: HOSPADM

## 2023-06-14 RX ORDER — METHYLPREDNISOLONE SODIUM SUCCINATE 125 MG/2ML
125 INJECTION, POWDER, LYOPHILIZED, FOR SOLUTION INTRAMUSCULAR; INTRAVENOUS ONCE
Status: COMPLETED | OUTPATIENT
Start: 2023-06-14 | End: 2023-06-14

## 2023-06-14 RX ORDER — OXYCODONE HCL 5 MG/5 ML
2.5 SOLUTION, ORAL ORAL EVERY 4 HOURS PRN
Status: DISCONTINUED | OUTPATIENT
Start: 2023-06-14 | End: 2023-06-15

## 2023-06-14 RX ORDER — ACETAMINOPHEN 325 MG/1
975 TABLET ORAL EVERY 8 HOURS SCHEDULED
Status: DISCONTINUED | OUTPATIENT
Start: 2023-06-14 | End: 2023-06-17 | Stop reason: HOSPADM

## 2023-06-14 RX ORDER — DIPHENHYDRAMINE HYDROCHLORIDE 50 MG/ML
50 INJECTION INTRAMUSCULAR; INTRAVENOUS ONCE
Status: COMPLETED | OUTPATIENT
Start: 2023-06-14 | End: 2023-06-14

## 2023-06-14 RX ORDER — GABAPENTIN 300 MG/1
600 CAPSULE ORAL 3 TIMES DAILY
Status: DISCONTINUED | OUTPATIENT
Start: 2023-06-14 | End: 2023-06-17 | Stop reason: HOSPADM

## 2023-06-14 RX ORDER — OXYCODONE HCL 5 MG/5 ML
5 SOLUTION, ORAL ORAL EVERY 4 HOURS PRN
Status: DISCONTINUED | OUTPATIENT
Start: 2023-06-14 | End: 2023-06-15

## 2023-06-14 RX ORDER — ONDANSETRON 2 MG/ML
4 INJECTION INTRAMUSCULAR; INTRAVENOUS EVERY 6 HOURS PRN
Status: DISCONTINUED | OUTPATIENT
Start: 2023-06-14 | End: 2023-06-17 | Stop reason: HOSPADM

## 2023-06-14 RX ORDER — SODIUM CHLORIDE, SODIUM GLUCONATE, SODIUM ACETATE, POTASSIUM CHLORIDE, MAGNESIUM CHLORIDE, SODIUM PHOSPHATE, DIBASIC, AND POTASSIUM PHOSPHATE .53; .5; .37; .037; .03; .012; .00082 G/100ML; G/100ML; G/100ML; G/100ML; G/100ML; G/100ML; G/100ML
1000 INJECTION, SOLUTION INTRAVENOUS ONCE
Status: COMPLETED | OUTPATIENT
Start: 2023-06-14 | End: 2023-06-14

## 2023-06-14 RX ORDER — BUSPIRONE HYDROCHLORIDE 5 MG/1
10 TABLET ORAL 3 TIMES DAILY
Status: DISCONTINUED | OUTPATIENT
Start: 2023-06-14 | End: 2023-06-17 | Stop reason: HOSPADM

## 2023-06-14 RX ORDER — BUPROPION HYDROCHLORIDE 300 MG/1
300 TABLET ORAL DAILY
COMMUNITY

## 2023-06-14 RX ADMIN — SERTRALINE HYDROCHLORIDE 100 MG: 20 SOLUTION ORAL at 10:03

## 2023-06-14 RX ADMIN — OXYCODONE HYDROCHLORIDE 5 MG: 5 SOLUTION ORAL at 17:47

## 2023-06-14 RX ADMIN — BUPROPION HYDROCHLORIDE 300 MG: 150 TABLET, FILM COATED, EXTENDED RELEASE ORAL at 08:36

## 2023-06-14 RX ADMIN — SODIUM CHLORIDE 1000 ML: 0.9 INJECTION, SOLUTION INTRAVENOUS at 01:51

## 2023-06-14 RX ADMIN — FAMOTIDINE 20 MG: 10 INJECTION, SOLUTION INTRAVENOUS at 03:45

## 2023-06-14 RX ADMIN — BACITRACIN 1 LARGE APPLICATION: 500 OINTMENT TOPICAL at 17:50

## 2023-06-14 RX ADMIN — BUSPIRONE HYDROCHLORIDE 10 MG: 5 TABLET ORAL at 08:36

## 2023-06-14 RX ADMIN — OXYCODONE HYDROCHLORIDE 5 MG: 5 SOLUTION ORAL at 08:37

## 2023-06-14 RX ADMIN — EPINEPHRINE 0.5 MG: 1 INJECTION, SOLUTION, CONCENTRATE INTRAVENOUS at 03:42

## 2023-06-14 RX ADMIN — GABAPENTIN 600 MG: 300 CAPSULE ORAL at 21:44

## 2023-06-14 RX ADMIN — SODIUM CHLORIDE 1000 ML: 0.9 INJECTION, SOLUTION INTRAVENOUS at 03:39

## 2023-06-14 RX ADMIN — METHYLPREDNISOLONE SODIUM SUCCINATE 125 MG: 125 INJECTION, POWDER, FOR SOLUTION INTRAMUSCULAR; INTRAVENOUS at 03:46

## 2023-06-14 RX ADMIN — DIPHENHYDRAMINE HYDROCHLORIDE 50 MG: 50 INJECTION, SOLUTION INTRAMUSCULAR; INTRAVENOUS at 03:45

## 2023-06-14 RX ADMIN — CHLORHEXIDINE GLUCONATE 15 ML: 1.2 SOLUTION ORAL at 08:27

## 2023-06-14 RX ADMIN — ACETAMINOPHEN 975 MG: 325 TABLET, FILM COATED ORAL at 08:36

## 2023-06-14 RX ADMIN — GABAPENTIN 600 MG: 300 CAPSULE ORAL at 00:24

## 2023-06-14 RX ADMIN — BUSPIRONE HYDROCHLORIDE 10 MG: 5 TABLET ORAL at 17:50

## 2023-06-14 RX ADMIN — BACITRACIN 1 LARGE APPLICATION: 500 OINTMENT TOPICAL at 10:03

## 2023-06-14 RX ADMIN — QUETIAPINE FUMARATE 50 MG: 25 TABLET ORAL at 21:44

## 2023-06-14 RX ADMIN — CHLORHEXIDINE GLUCONATE 15 ML: 1.2 SOLUTION ORAL at 21:44

## 2023-06-14 RX ADMIN — OXYCODONE HYDROCHLORIDE 5 MG: 5 SOLUTION ORAL at 21:44

## 2023-06-14 RX ADMIN — SODIUM CHLORIDE, SODIUM GLUCONATE, SODIUM ACETATE, POTASSIUM CHLORIDE, MAGNESIUM CHLORIDE, SODIUM PHOSPHATE, DIBASIC, AND POTASSIUM PHOSPHATE 1000 ML: .53; .5; .37; .037; .03; .012; .00082 INJECTION, SOLUTION INTRAVENOUS at 04:56

## 2023-06-14 RX ADMIN — ACETAMINOPHEN 975 MG: 325 TABLET, FILM COATED ORAL at 17:48

## 2023-06-14 RX ADMIN — GABAPENTIN 600 MG: 300 CAPSULE ORAL at 08:36

## 2023-06-14 RX ADMIN — GABAPENTIN 600 MG: 300 CAPSULE ORAL at 17:49

## 2023-06-14 RX ADMIN — SODIUM CHLORIDE 150 ML/HR: 0.9 INJECTION, SOLUTION INTRAVENOUS at 03:13

## 2023-06-14 RX ADMIN — OXYCODONE HYDROCHLORIDE 5 MG: 5 SOLUTION ORAL at 12:46

## 2023-06-14 RX ADMIN — BUSPIRONE HYDROCHLORIDE 10 MG: 5 TABLET ORAL at 00:53

## 2023-06-14 RX ADMIN — ENOXAPARIN SODIUM 40 MG: 40 INJECTION SUBCUTANEOUS at 08:27

## 2023-06-14 RX ADMIN — SODIUM CHLORIDE 1000 ML: 0.9 INJECTION, SOLUTION INTRAVENOUS at 02:42

## 2023-06-14 RX ADMIN — QUETIAPINE FUMARATE 50 MG: 25 TABLET ORAL at 00:24

## 2023-06-14 RX ADMIN — TETANUS TOXOID, REDUCED DIPHTHERIA TOXOID AND ACELLULAR PERTUSSIS VACCINE, ADSORBED 0.5 ML: 5; 2.5; 8; 8; 2.5 SUSPENSION INTRAMUSCULAR at 01:42

## 2023-06-14 RX ADMIN — BUSPIRONE HYDROCHLORIDE 10 MG: 5 TABLET ORAL at 21:44
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
## 2023-06-14 NOTE — ASSESSMENT & PLAN NOTE
· Takes gabapentin to help manage his alcoholism but, ran out   · Started drinking last night came in ED with bottle of vodka in his bag     · Monitor for withdrawal   · CIWA protocol    · Seizure precautions   · Encourage cessation
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
## 2023-06-14 NOTE — ED PROCEDURE NOTE
PROCEDURE  CriticalCare Time    Date/Time: 6/14/2023 4:50 AM    Performed by: Sonu Taylor MD  Authorized by: Sonu Taylor MD    Critical care provider statement:     Critical care time (minutes):  35    Critical care time was exclusive of:  Separately billable procedures and treating other patients    Critical care was necessary to treat or prevent imminent or life-threatening deterioration of the following conditions:  Shock (anaphylaxis)    Critical care was time spent personally by me on the following activities:  Obtaining history from patient or surrogate, development of treatment plan with patient or surrogate, evaluation of patient's response to treatment, examination of patient, ordering and performing treatments and interventions, ordering and review of laboratory studies, ordering and review of radiographic studies and re-evaluation of patient's condition    I assumed direction of critical care for this patient from another provider in my specialty: no           Sonu Taylor MD  06/14/23 8818
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
## 2023-06-14 NOTE — QUICK NOTE
Patient Name: Lydia Oliver  Patient MRN: [id number]   Date: 06/14/23   Time: 7406  Unit/Bed: ICU 15    Family/Relationship: Mounika Szymanski (Mother)    Location: Bedside    Content of meeting: Update on patient status, plan of care    Family understanding of patient's condition: Good    Comments: None    Time spent: 5 minutes    ANTONIO Esteban
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
## 2023-06-14 NOTE — PLAN OF CARE
Problem: MOBILITY - ADULT  Goal: Maintain or return to baseline ADL function  Description: INTERVENTIONS:  -  Assess patient's ability to carry out ADLs; assess patient's baseline for ADL function and identify physical deficits which impact ability to perform ADLs (bathing, care of mouth/teeth, toileting, grooming, dressing, etc )  - Assess/evaluate cause of self-care deficits   - Assess range of motion  - Assess patient's mobility; develop plan if impaired  - Assess patient's need for assistive devices and provide as appropriate  - Encourage maximum independence but intervene and supervise when necessary  - Involve family in performance of ADLs  - Assess for home care needs following discharge   - Consider OT consult to assist with ADL evaluation and planning for discharge  - Provide patient education as appropriate  6/14/2023 0538 by Oma Penaloza RN  Outcome: Progressing  6/14/2023 0538 by Oma Penaloza RN  Outcome: Progressing  6/14/2023 0538 by Oma Penaloza RN  Outcome: Progressing     Problem: PAIN - ADULT  Goal: Verbalizes/displays adequate comfort level or baseline comfort level  Description: Interventions:  - Encourage patient to monitor pain and request assistance  - Assess pain using appropriate pain scale  - Administer analgesics based on type and severity of pain and evaluate response  - Implement non-pharmacological measures as appropriate and evaluate response  - Consider cultural and social influences on pain and pain management  - Notify physician/advanced practitioner if interventions unsuccessful or patient reports new pain  Outcome: Progressing     Problem: INFECTION - ADULT  Goal: Absence or prevention of progression during hospitalization  Description: INTERVENTIONS:  - Assess and monitor for signs and symptoms of infection  - Monitor lab/diagnostic results  - Monitor all insertion sites, i e  indwelling lines, tubes, and drains  - Monitor endotracheal if appropriate and nasal secretions for changes in amount and color  - [place] appropriate cooling/warming therapies per order  - Administer medications as ordered  - Instruct and encourage patient and family to use good hand hygiene technique  - Identify and instruct in appropriate isolation precautions for identified infection/condition  Outcome: Progressing  Goal: Absence of fever/infection during neutropenic period  Description: INTERVENTIONS:  - Monitor WBC    Outcome: Progressing     Problem: SAFETY ADULT  Goal: Maintain or return to baseline ADL function  Description: INTERVENTIONS:  -  Assess patient's ability to carry out ADLs; assess patient's baseline for ADL function and identify physical deficits which impact ability to perform ADLs (bathing, care of mouth/teeth, toileting, grooming, dressing, etc )  - Assess/evaluate cause of self-care deficits   - Assess range of motion  - Assess patient's mobility; develop plan if impaired  - Assess patient's need for assistive devices and provide as appropriate  - Encourage maximum independence but intervene and supervise when necessary  - Involve family in performance of ADLs  - Assess for home care needs following discharge   - Consider OT consult to assist with ADL evaluation and planning for discharge  - Provide patient education as appropriate  6/14/2023 0538 by Jorgito Fritz RN  Outcome: Progressing  6/14/2023 0538 by Jorgito Fritz RN  Outcome: Progressing  Goal: Maintains/Returns to pre admission functional level  Description: INTERVENTIONS:  - Perform BMAT or MOVE assessment daily    - Set and communicate daily mobility goal to care team and patient/family/caregiver  - Collaborate with rehabilitation services on mobility goals if consulted  - Perform Range of Motion 3 times a day  - Reposition patient every 2 hours    - Dangle patient 3 times a day  - Stand patient 3 times a day  - Ambulate patient 3 times a day  - Out of bed to chair 3 times a day   - Out of bed for meals 3 times a day  - Out of bed for toileting  - Record patient progress and toleration of activity level   6/14/2023 0538 by La Latif RN  Outcome: Progressing  6/14/2023 0538 by La Latif RN  Outcome: Progressing  Goal: Patient will remain free of falls  Description: INTERVENTIONS:  - Educate patient/family on patient safety including physical limitations  - Instruct patient to call for assistance with activity   - Consult OT/PT to assist with strengthening/mobility   - Keep Call bell within reach  - Keep bed low and locked with side rails adjusted as appropriate  - Keep care items and personal belongings within reach  - Initiate and maintain comfort rounds  - Make Fall Risk Sign visible to staff  - Offer Toileting every 2 Hours, in advance of need  - Initiate/Maintain bed alarm  - Apply yellow socks and bracelet for high fall risk patients  - Consider moving patient to room near nurses station  Outcome: Progressing     Problem: DISCHARGE PLANNING  Goal: Discharge to home or other facility with appropriate resources  Description: INTERVENTIONS:  - Identify barriers to discharge w/patient and caregiver  - Arrange for needed discharge resources and transportation as appropriate  - Identify discharge learning needs (meds, wound care, etc )  - Arrange for interpretive services to assist at discharge as needed  - Refer to Case Management Department for coordinating discharge planning if the patient needs post-hospital services based on physician/advanced practitioner order or complex needs related to functional status, cognitive ability, or social support system  Outcome: Progressing     Problem: Knowledge Deficit  Goal: Patient/family/caregiver demonstrates understanding of disease process, treatment plan, medications, and discharge instructions  Description: Complete learning assessment and assess knowledge base    Interventions:  - Provide teaching at level of understanding  - Provide teaching via preferred learning methods  Outcome: Progressing

## 2023-06-14 NOTE — PROGRESS NOTES
Critical Care Interval Transfer Note:    Please refer to progress note from earlier today for full details  Changes made on day of transfer:   · Advance diet, d/c IVF  · Started home medications: Wellbutrin, Buspar, Gabapentin, Seroquel, Zoloft  · Follow up psych consult, will likely see patient tomorrow (6/15)  · Added Tetanus to allergies     Consults: IP CONSULT TO CASE MANAGEMENT  IP CONSULT TO PSYCHIATRY    Recommended to review admission imaging for incidental findings and document in discharge navigator: Chart reviewed, no known incidental findings noted at this time  Discharge Plan: Anticipate discharge in 24-48 hrs to Inpatient psych vs home    Patient seen and evaluated by Critical Care today and deemed to be appropriate for transfer to Med Surg  Spoke to Dr Danica Bucio from [facility] to accept transfer  Critical care can be contacted via [employer] with any questions or concerns

## 2023-06-15 PROBLEM — T78.40XA ALLERGIC REACTION: Status: ACTIVE | Noted: 2023-06-14

## 2023-06-15 LAB
ANION GAP SERPL CALCULATED.3IONS-SCNC: 6 MMOL/L (ref 4–13)
ANION GAP SERPL CALCULATED.3IONS-SCNC: 6 MMOL/L (ref 4–13)
BUN SERPL-MCNC: 15 MG/DL (ref 5–25)
BUN SERPL-MCNC: 15 MG/DL (ref 5–25)
CA-I BLD-SCNC: 1.13 MMOL/L (ref 1.12–1.32)
CA-I BLD-SCNC: 1.13 MMOL/L (ref 1.12–1.32)
CALCIUM SERPL-MCNC: 7.8 MG/DL (ref 8.4–10.2)
CALCIUM SERPL-MCNC: 7.8 MG/DL (ref 8.4–10.2)
CHLORIDE SERPL-SCNC: 110 MMOL/L (ref 96–108)
CHLORIDE SERPL-SCNC: 110 MMOL/L (ref 96–108)
CO2 SERPL-SCNC: 25 MMOL/L (ref 21–32)
CO2 SERPL-SCNC: 25 MMOL/L (ref 21–32)
CREAT SERPL-MCNC: 0.99 MG/DL (ref 0.6–1.3)
CREAT SERPL-MCNC: 0.99 MG/DL (ref 0.6–1.3)
ERYTHROCYTE [DISTWIDTH] IN BLOOD BY AUTOMATED COUNT: 13.2 % (ref 11.6–15.1)
ERYTHROCYTE [DISTWIDTH] IN BLOOD BY AUTOMATED COUNT: 13.2 % (ref 11.6–15.1)
GFR SERPL CREATININE-BSD FRML MDRD: 98 ML/MIN/1.73SQ M
GFR SERPL CREATININE-BSD FRML MDRD: 98 ML/MIN/1.73SQ M
GLUCOSE SERPL-MCNC: 133 MG/DL (ref 65–140)
GLUCOSE SERPL-MCNC: 133 MG/DL (ref 65–140)
HCT VFR BLD AUTO: 37.6 % (ref 36.5–49.3)
HCT VFR BLD AUTO: 37.6 % (ref 36.5–49.3)
HGB BLD-MCNC: 12.6 G/DL (ref 12–17)
HGB BLD-MCNC: 12.6 G/DL (ref 12–17)
MAGNESIUM SERPL-MCNC: 1.9 MG/DL (ref 1.9–2.7)
MAGNESIUM SERPL-MCNC: 1.9 MG/DL (ref 1.9–2.7)
MCH RBC QN AUTO: 29.6 PG (ref 26.8–34.3)
MCH RBC QN AUTO: 29.6 PG (ref 26.8–34.3)
MCHC RBC AUTO-ENTMCNC: 33.5 G/DL (ref 31.4–37.4)
MCHC RBC AUTO-ENTMCNC: 33.5 G/DL (ref 31.4–37.4)
MCV RBC AUTO: 89 FL (ref 82–98)
MCV RBC AUTO: 89 FL (ref 82–98)
PHOSPHATE SERPL-MCNC: 3.5 MG/DL (ref 2.7–4.5)
PHOSPHATE SERPL-MCNC: 3.5 MG/DL (ref 2.7–4.5)
PLATELET # BLD AUTO: 146 THOUSANDS/UL (ref 149–390)
PLATELET # BLD AUTO: 146 THOUSANDS/UL (ref 149–390)
PMV BLD AUTO: 9.5 FL (ref 8.9–12.7)
PMV BLD AUTO: 9.5 FL (ref 8.9–12.7)
POTASSIUM SERPL-SCNC: 3.7 MMOL/L (ref 3.5–5.3)
POTASSIUM SERPL-SCNC: 3.7 MMOL/L (ref 3.5–5.3)
RBC # BLD AUTO: 4.25 MILLION/UL (ref 3.88–5.62)
RBC # BLD AUTO: 4.25 MILLION/UL (ref 3.88–5.62)
SODIUM SERPL-SCNC: 141 MMOL/L (ref 135–147)
SODIUM SERPL-SCNC: 141 MMOL/L (ref 135–147)
WBC # BLD AUTO: 6.8 THOUSAND/UL (ref 4.31–10.16)
WBC # BLD AUTO: 6.8 THOUSAND/UL (ref 4.31–10.16)

## 2023-06-15 PROCEDURE — 85027 COMPLETE CBC AUTOMATED: CPT

## 2023-06-15 PROCEDURE — 99232 SBSQ HOSP IP/OBS MODERATE 35: CPT | Performed by: INTERNAL MEDICINE

## 2023-06-15 PROCEDURE — 99232 SBSQ HOSP IP/OBS MODERATE 35: CPT | Performed by: SURGERY

## 2023-06-15 PROCEDURE — 84100 ASSAY OF PHOSPHORUS: CPT

## 2023-06-15 PROCEDURE — 83735 ASSAY OF MAGNESIUM: CPT

## 2023-06-15 PROCEDURE — 99222 1ST HOSP IP/OBS MODERATE 55: CPT | Performed by: PSYCHIATRY & NEUROLOGY

## 2023-06-15 PROCEDURE — 82330 ASSAY OF CALCIUM: CPT

## 2023-06-15 PROCEDURE — 80048 BASIC METABOLIC PNL TOTAL CA: CPT

## 2023-06-15 RX ORDER — SERTRALINE HYDROCHLORIDE 100 MG/1
100 TABLET, FILM COATED ORAL DAILY
Status: DISCONTINUED | OUTPATIENT
Start: 2023-06-15 | End: 2023-06-17 | Stop reason: HOSPADM

## 2023-06-15 RX ORDER — OXYCODONE HYDROCHLORIDE 5 MG/1
5 TABLET ORAL EVERY 4 HOURS PRN
Status: DISCONTINUED | OUTPATIENT
Start: 2023-06-15 | End: 2023-06-17 | Stop reason: HOSPADM

## 2023-06-15 RX ADMIN — BUPROPION HYDROCHLORIDE 300 MG: 150 TABLET, FILM COATED, EXTENDED RELEASE ORAL at 07:44

## 2023-06-15 RX ADMIN — CHLORHEXIDINE GLUCONATE 15 ML: 1.2 SOLUTION ORAL at 21:29

## 2023-06-15 RX ADMIN — ACETAMINOPHEN 975 MG: 325 TABLET, FILM COATED ORAL at 23:53

## 2023-06-15 RX ADMIN — OXYCODONE HYDROCHLORIDE 5 MG: 5 TABLET ORAL at 18:45

## 2023-06-15 RX ADMIN — ENOXAPARIN SODIUM 40 MG: 40 INJECTION SUBCUTANEOUS at 07:45

## 2023-06-15 RX ADMIN — GABAPENTIN 600 MG: 300 CAPSULE ORAL at 16:46

## 2023-06-15 RX ADMIN — ACETAMINOPHEN 975 MG: 325 TABLET, FILM COATED ORAL at 16:46

## 2023-06-15 RX ADMIN — OXYCODONE HYDROCHLORIDE 5 MG: 5 SOLUTION ORAL at 01:46

## 2023-06-15 RX ADMIN — GABAPENTIN 600 MG: 300 CAPSULE ORAL at 07:45

## 2023-06-15 RX ADMIN — ACETAMINOPHEN 975 MG: 325 TABLET, FILM COATED ORAL at 07:43

## 2023-06-15 RX ADMIN — BACITRACIN 1 LARGE APPLICATION: 500 OINTMENT TOPICAL at 07:43

## 2023-06-15 RX ADMIN — BUSPIRONE HYDROCHLORIDE 10 MG: 5 TABLET ORAL at 21:28

## 2023-06-15 RX ADMIN — FOLIC ACID: 5 INJECTION, SOLUTION INTRAMUSCULAR; INTRAVENOUS; SUBCUTANEOUS at 16:47

## 2023-06-15 RX ADMIN — GABAPENTIN 600 MG: 300 CAPSULE ORAL at 21:28

## 2023-06-15 RX ADMIN — BUSPIRONE HYDROCHLORIDE 10 MG: 5 TABLET ORAL at 16:46

## 2023-06-15 RX ADMIN — SODIUM CHLORIDE 150 ML/HR: 0.9 INJECTION, SOLUTION INTRAVENOUS at 06:43

## 2023-06-15 RX ADMIN — BUSPIRONE HYDROCHLORIDE 10 MG: 5 TABLET ORAL at 07:44

## 2023-06-15 RX ADMIN — OXYCODONE HYDROCHLORIDE 5 MG: 5 TABLET ORAL at 23:06

## 2023-06-15 RX ADMIN — OXYCODONE HYDROCHLORIDE 5 MG: 5 SOLUTION ORAL at 07:42

## 2023-06-15 RX ADMIN — CHLORHEXIDINE GLUCONATE 15 ML: 1.2 SOLUTION ORAL at 07:44

## 2023-06-15 RX ADMIN — QUETIAPINE FUMARATE 50 MG: 25 TABLET ORAL at 21:29

## 2023-06-15 RX ADMIN — OXYCODONE HYDROCHLORIDE 5 MG: 5 TABLET ORAL at 13:52

## 2023-06-15 RX ADMIN — SODIUM CHLORIDE 150 ML/HR: 0.9 INJECTION, SOLUTION INTRAVENOUS at 19:47
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
## 2023-06-15 NOTE — CASE MANAGEMENT
Case Management Assessment & Discharge Planning Note    Patient name Ny Mcgraw  Location ICU 15/ICU 15 MRN [id number]  : 1990 Date 6/15/2023       Current Admission Date: 2023  Current Admission Diagnosis:Suicide attempt [facility])   Patient Active Problem List    Diagnosis Date Noted   • Allergic reaction 2023   • Anxiety and depression 2023   • EtOH dependence ([facility] [address] ) 2023   • Suicide attempt ([facility] [address] ) 2023   • Abrasions of multiple sites 2023      LOS (days): 1  Geometric Mean LOS (GMLOS) (days):   Days to GMLOS:     OBJECTIVE:    Risk of Unplanned Readmission Score: 10 26         Current admission status: Inpatient       Preferred Pharmacy:   PATIENT/FAMILY REPORTS NO PREFERRED PHARMACY  No address on file      Primary Care Provider: Nixon Hernandez MD    Primary Insurance: [address] [place] [address]  Secondary Insurance:     ASSESSMENT:  Readmission Root Cause  30 Day Readmission: No    Patient Information  Admitted from[de-identified] Home  Mental Status: Alert  During Assessment patient was accompanied by: Not accompanied during assessment  Support Systems: Self, Parent  South Bill of Residence: [address] [place] [address] do you live in?: [address] [place] [address] Dr of Daily Living Prior to Admission  Functional Status: Independent  Completes ADLs independently?: Yes  Ambulates independently?: Yes  Does patient use assisted devices?: No  Does patient currently own DME?: No  Does patient have a history of Outpatient Therapy (PT/OT)?: No  Does the patient have a history of Short-Term Rehab?: No  Does patient have a history of C?: No  Does patient currently have [facility]?: No    Patient Information Continued  Income Source: Employed  Does patient have prescription coverage?: Yes  Within the past 12 months, you worried that your food would run out before you got the money to buy more : Never true  Within the past 12 months, the food you bought just didn't last and you didn't have money to get more : Never true  Food insecurity resource given?: No  Does patient receive dialysis treatments?: No  Does patient have a history of substance abuse?: Yes  Historical substance use preference: Alcohol/ETOH  Current Status[de-identified] 156 (201 signed with psychiatry provider today )  Does patient have a history of Mental Health Diagnosis?: Yes (Anxiety, Depression)  Has patient received inpatient treatment related to mental health in the last 2 years?: No    PHQ 2/9 Screening   Reviewed PHQ 2/9 Depression Screening Score?: No    Means of Transportation  Means of Transport to Appts[de-identified] Drives Self  In the past 12 months, has lack of transportation kept you from medical appointments or from getting medications?: No  In the past 12 months, has lack of transportation kept you from meetings, work, or from getting things needed for daily living?: No  Was application for public transport provided?: No    DISCHARGE DETAILS:  Freedom of Choice: Yes  Comments - Freedom of Choice: Patient chose to sign [place]         Is the patient interested in [facility] at discharge?: No    DME Referral Provided  Referral made for DME?: No    Other Referral/Resources/Interventions Provided:  Interventions: Inpatient Behavioral Health  Referral Comments: Referrals sent to [facility] facilities at this time      Would you like to participate in our [address] service program?  : No - Declined    Treatment Team Recommendation: Inpatient Behavioral Health  Discharge Destination Plan[de-identified] Inpatient Behavioral Health  Transport at Discharge : [facility] Transfer, BLS Ambulance

## 2023-06-15 NOTE — ASSESSMENT & PLAN NOTE
"-Pt has long-standing hx anxiety/depression complicated by EtOH abuse    -takes home zoloft 100 qd, wellbutrin 300 qd, buspar 10 TID, seroquel 50 HS    Plan  -Continue home medications  -See further plan under \"suicide attempt\"    "
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
## 2023-06-15 NOTE — PLAN OF CARE
Problem: MOBILITY - ADULT  Goal: Maintain or return to baseline ADL function  Description: INTERVENTIONS:  -  Assess patient's ability to carry out ADLs; assess patient's baseline for ADL function and identify physical deficits which impact ability to perform ADLs (bathing, care of mouth/teeth, toileting, grooming, dressing, etc )  - Assess/evaluate cause of self-care deficits   - Assess range of motion  - Assess patient's mobility; develop plan if impaired  - Assess patient's need for assistive devices and provide as appropriate  - Encourage maximum independence but intervene and supervise when necessary  - Involve family in performance of ADLs  - Assess for home care needs following discharge   - Consider OT consult to assist with ADL evaluation and planning for discharge  - Provide patient education as appropriate  Outcome: Progressing     Problem: MOBILITY - ADULT  Goal: Maintains/Returns to pre admission functional level  Description: INTERVENTIONS:  - Perform BMAT or MOVE assessment daily    - Set and communicate daily mobility goal to care team and patient/family/caregiver  - Collaborate with rehabilitation services on mobility goals if consulted  - Perform Range of Motion  times a day  - Reposition patient every  hours    - Dangle patient  times a day  - Stand patient  times a day  - Ambulate patient  times a day  - Out of bed to chair  times a day   - Out of bed for meals  times a day  - Out of bed for toileting  - Record patient progress and toleration of activity level   Outcome: Progressing     Problem: PAIN - ADULT  Goal: Verbalizes/displays adequate comfort level or baseline comfort level  Description: Interventions:  - Encourage patient to monitor pain and request assistance  - Assess pain using appropriate pain scale  - Administer analgesics based on type and severity of pain and evaluate response  - Implement non-pharmacological measures as appropriate and evaluate response  - Consider cultural and social influences on pain and pain management  - Notify physician/advanced practitioner if interventions unsuccessful or patient reports new pain  Outcome: Progressing     Problem: INFECTION - ADULT  Goal: Absence or prevention of progression during hospitalization  Description: INTERVENTIONS:  - Assess and monitor for signs and symptoms of infection  - Monitor lab/diagnostic results  - Monitor all insertion sites, i e  indwelling lines, tubes, and drains  - Monitor endotracheal if appropriate and nasal secretions for changes in amount and color  - [place] appropriate cooling/warming therapies per order  - Administer medications as ordered  - Instruct and encourage patient and family to use good hand hygiene technique  - Identify and instruct in appropriate isolation precautions for identified infection/condition  Outcome: Progressing  Goal: Absence of fever/infection during neutropenic period  Description: INTERVENTIONS:  - Monitor WBC    Outcome: Progressing     Problem: SAFETY ADULT  Goal: Maintain or return to baseline ADL function  Description: INTERVENTIONS:  -  Assess patient's ability to carry out ADLs; assess patient's baseline for ADL function and identify physical deficits which impact ability to perform ADLs (bathing, care of mouth/teeth, toileting, grooming, dressing, etc )  - Assess/evaluate cause of self-care deficits   - Assess range of motion  - Assess patient's mobility; develop plan if impaired  - Assess patient's need for assistive devices and provide as appropriate  - Encourage maximum independence but intervene and supervise when necessary  - Involve family in performance of ADLs  - Assess for home care needs following discharge   - Consider OT consult to assist with ADL evaluation and planning for discharge  - Provide patient education as appropriate  Outcome: Progressing  Goal: Maintains/Returns to pre admission functional level  Description: INTERVENTIONS:  - Perform BMAT or MOVE assessment daily    - Set and communicate daily mobility goal to care team and patient/family/caregiver  - Collaborate with rehabilitation services on mobility goals if consulted  - Perform Range of Motion  times a day  - Reposition patient every  hours    - Dangle patient  times a day  - Stand patient  times a day  - Ambulate patient  times a day  - Out of bed to chair  times a day   - Out of bed for meals  times a day  - Out of bed for toileting  - Record patient progress and toleration of activity level   Outcome: Progressing  Goal: Patient will remain free of falls  Description: INTERVENTIONS:  - Educate patient/family on patient safety including physical limitations  - Instruct patient to call for assistance with activity   - Consult OT/PT to assist with strengthening/mobility   - Keep Call bell within reach  - Keep bed low and locked with side rails adjusted as appropriate  - Keep care items and personal belongings within reach  - Initiate and maintain comfort rounds  - Make Fall Risk Sign visible to staff  - Offer Toileting every  Hours, in advance of need  - Initiate/Maintain alarm  - Obtain necessary fall risk management equipment:   - Apply yellow socks and bracelet for high fall risk patients  - Consider moving patient to room near nurses station  Outcome: Progressing     Problem: DISCHARGE PLANNING  Goal: Discharge to home or other facility with appropriate resources  Description: INTERVENTIONS:  - Identify barriers to discharge w/patient and caregiver  - Arrange for needed discharge resources and transportation as appropriate  - Identify discharge learning needs (meds, wound care, etc )  - Arrange for interpretive services to assist at discharge as needed  - Refer to Case Management Department for coordinating discharge planning if the patient needs post-hospital services based on physician/advanced practitioner order or complex needs related to functional status, cognitive ability, or social support system  Outcome: Progressing     Problem: Knowledge Deficit  Goal: Patient/family/caregiver demonstrates understanding of disease process, treatment plan, medications, and discharge instructions  Description: Complete learning assessment and assess knowledge base  Interventions:  - Provide teaching at level of understanding  - Provide teaching via preferred learning methods  Outcome: Progressing     Problem: Nutrition/Hydration-ADULT  Goal: Nutrient/Hydration intake appropriate for improving, restoring or maintaining nutritional needs  Description: Monitor and assess patient's nutrition/hydration status for malnutrition  Collaborate with interdisciplinary team and initiate plan and interventions as ordered  Monitor patient's weight and dietary intake as ordered or per policy  Utilize nutrition screening tool and intervene as necessary  Determine patient's food preferences and provide high-protein, high-caloric foods as appropriate       INTERVENTIONS:  - Monitor oral intake, urinary output, labs, and treatment plans  - Assess nutrition and hydration status and recommend course of action  - Evaluate amount of meals eaten  - Assist patient with eating if necessary   - Allow adequate time for meals  - Recommend/ encourage appropriate diets, oral nutritional supplements, and vitamin/mineral supplements  - Order, calculate, and assess calorie counts as needed  - Assess need for intravenous fluids  - Provide nutrition/hydration education as appropriate  - Include patient/family/caregiver in decisions related to nutrition  Outcome: Progressing
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
## 2023-06-15 NOTE — ASSESSMENT & PLAN NOTE
-Pt developed hypotension and then rash roughly 2 5 hours after receiving IV contrast and 3 minutes after receiving Tdap vaccination  -Resolved s/p IVF resuscitation, solu-medrol, IV Benadryl  -Given timeline most likely 2/2 Tdap    Plan  -Continue to monitor vitals  -avoid Tdap vaccinations in the future   Caution w/ other vaccinations and IV contrast
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
## 2023-06-16 LAB
FLUAV RNA RESP QL NAA+PROBE: NEGATIVE
FLUAV RNA RESP QL NAA+PROBE: NEGATIVE
FLUBV RNA RESP QL NAA+PROBE: NEGATIVE
FLUBV RNA RESP QL NAA+PROBE: NEGATIVE
RSV RNA RESP QL NAA+PROBE: NEGATIVE
RSV RNA RESP QL NAA+PROBE: NEGATIVE
SARS-COV-2 RNA RESP QL NAA+PROBE: NEGATIVE
SARS-COV-2 RNA RESP QL NAA+PROBE: NEGATIVE

## 2023-06-16 PROCEDURE — 0241U HB NFCT DS VIR RESP RNA 4 TRGT: CPT

## 2023-06-16 PROCEDURE — 99232 SBSQ HOSP IP/OBS MODERATE 35: CPT | Performed by: INTERNAL MEDICINE

## 2023-06-16 RX ORDER — ONDANSETRON 2 MG/ML
4 INJECTION INTRAMUSCULAR; INTRAVENOUS EVERY 6 HOURS PRN
Refills: 0
Start: 2023-06-16

## 2023-06-16 RX ORDER — ACETAMINOPHEN 325 MG/1
975 TABLET ORAL EVERY 8 HOURS SCHEDULED
Refills: 0
Start: 2023-06-16

## 2023-06-16 RX ORDER — QUETIAPINE FUMARATE 50 MG/1
50 TABLET, FILM COATED ORAL
Qty: 2 TABLET | Refills: 0 | Status: SHIPPED | OUTPATIENT
Start: 2023-06-16 | End: 2023-06-16 | Stop reason: SDUPTHER

## 2023-06-16 RX ORDER — QUETIAPINE FUMARATE 50 MG/1
50 TABLET, FILM COATED ORAL
Qty: 2 TABLET | Refills: 0
Start: 2023-06-16 | End: 2023-07-16

## 2023-06-16 RX ORDER — OXYCODONE HYDROCHLORIDE 5 MG/1
2.5 TABLET ORAL EVERY 4 HOURS PRN
Qty: 6 TABLET | Refills: 0 | Status: SHIPPED | OUTPATIENT
Start: 2023-06-16 | End: 2023-06-17

## 2023-06-16 RX ADMIN — BACITRACIN 1 LARGE APPLICATION: 500 OINTMENT TOPICAL at 08:12

## 2023-06-16 RX ADMIN — CHLORHEXIDINE GLUCONATE 15 ML: 1.2 SOLUTION ORAL at 20:36

## 2023-06-16 RX ADMIN — BUSPIRONE HYDROCHLORIDE 10 MG: 5 TABLET ORAL at 20:37

## 2023-06-16 RX ADMIN — ENOXAPARIN SODIUM 40 MG: 40 INJECTION SUBCUTANEOUS at 08:15

## 2023-06-16 RX ADMIN — BUPROPION HYDROCHLORIDE 300 MG: 150 TABLET, FILM COATED, EXTENDED RELEASE ORAL at 09:02

## 2023-06-16 RX ADMIN — ACETAMINOPHEN 975 MG: 325 TABLET, FILM COATED ORAL at 08:14

## 2023-06-16 RX ADMIN — GABAPENTIN 600 MG: 300 CAPSULE ORAL at 20:36

## 2023-06-16 RX ADMIN — BUSPIRONE HYDROCHLORIDE 10 MG: 5 TABLET ORAL at 08:14

## 2023-06-16 RX ADMIN — ACETAMINOPHEN 975 MG: 325 TABLET, FILM COATED ORAL at 16:16

## 2023-06-16 RX ADMIN — SODIUM CHLORIDE 150 ML/HR: 0.9 INJECTION, SOLUTION INTRAVENOUS at 02:12

## 2023-06-16 RX ADMIN — OXYCODONE HYDROCHLORIDE 5 MG: 5 TABLET ORAL at 05:52

## 2023-06-16 RX ADMIN — OXYCODONE HYDROCHLORIDE 5 MG: 5 TABLET ORAL at 17:02

## 2023-06-16 RX ADMIN — QUETIAPINE FUMARATE 50 MG: 25 TABLET ORAL at 22:12

## 2023-06-16 RX ADMIN — OXYCODONE HYDROCHLORIDE 5 MG: 5 TABLET ORAL at 20:38

## 2023-06-16 RX ADMIN — GABAPENTIN 600 MG: 300 CAPSULE ORAL at 08:14

## 2023-06-16 RX ADMIN — CHLORHEXIDINE GLUCONATE 15 ML: 1.2 SOLUTION ORAL at 08:14

## 2023-06-16 RX ADMIN — BUSPIRONE HYDROCHLORIDE 10 MG: 5 TABLET ORAL at 16:16

## 2023-06-16 RX ADMIN — SERTRALINE HYDROCHLORIDE 100 MG: 50 TABLET ORAL at 08:14

## 2023-06-16 RX ADMIN — BACITRACIN 1 LARGE APPLICATION: 500 OINTMENT TOPICAL at 17:02

## 2023-06-16 RX ADMIN — GABAPENTIN 600 MG: 300 CAPSULE ORAL at 16:16

## 2023-06-16 RX ADMIN — OXYCODONE HYDROCHLORIDE 5 MG: 5 TABLET ORAL at 12:50

## 2023-06-16 RX ADMIN — FOLIC ACID: 5 INJECTION, SOLUTION INTRAMUSCULAR; INTRAVENOUS; SUBCUTANEOUS at 08:12
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
## 2023-06-16 NOTE — INCIDENTAL FINDINGS
The following findings require follow up:  Radiographic finding   Findin mm non obstructing R kidney stone   Follow up required: Please discuss this finding with your PCP   Follow up should be done within Ahmet Garner MD

## 2023-06-16 NOTE — QUICK NOTE
Pt seen at bedside this morning  Resting comfortably, no new complaints  He is medically cleared for transfer to a psychiatric facility pending bed availability

## 2023-06-16 NOTE — CASE MANAGEMENT
Case Management Discharge Planning Note    Patient name Ching Schroeder  Location ICU 15/ICU 15 MRN [id number]  : 1990 Date 2023       Current Admission Date: 2023  Current Admission Diagnosis:Suicide attempt [facility])   Patient Active Problem List    Diagnosis Date Noted   • Allergic reaction 2023   • Anxiety and depression 2023   • EtOH dependence ([facility] [address] ) 2023   • Suicide attempt ([facility] [address] ) 2023   • Abrasions of multiple sites 2023      LOS (days): 2  Geometric Mean LOS (GMLOS) (days):   Days to GMLOS:     OBJECTIVE:  Risk of Unplanned Readmission Score: 10 81         Current admission status: Inpatient   Preferred Pharmacy:   PATIENT/FAMILY REPORTS NO PREFERRED PHARMACY  No address on file      Primary Care Provider: Yulia Woodard MD    Primary Insurance: [address] [place] [address]  Secondary Insurance:     DISCHARGE DETAILS:    Transport at Discharge : [facility]  Dispatcher Contacted: Yes  Number/Name of Dispatcher: Round Trip [phone]  Transported by [employer] and Unit #): LENA  ETA of Transport (Date): 23  ETA of Transport (Time): 1000 (Physician, Nurse, and [address])    [address] Name, [address] : [facility]  Receiving Facility/Agency Phone Number: [phone]    CM spoke with [facility] at [address]  He is aware of 10 a m  transport for tomorrow  He requested auth information is faxed to them  EMILY Hansen confirmed address for admission is: [address] [place] [address]  Pt made aware of d/c tomorrow morning to [address]  Pt requested CM update his mother, Zora Ahumada  CM also contacted pt mother, Zora Ahumada and notified her of transport time for tomorrow to [address]

## 2023-06-16 NOTE — CASE MANAGEMENT
Case Management Progress Note    Patient name Rhina Bai  Location ICU 15/ICU 15 MRN [id number]  : 1990 Date 2023       LOS (days): 2  Geometric Mean LOS (GMLOS) (days):   Days to GMLOS:        OBJECTIVE:        Current admission status: Inpatient  Preferred Pharmacy:   PATIENT/FAMILY REPORTS NO PREFERRED PHARMACY  No address on file      Primary Care Provider: Daisha Fletcher MD    Primary Insurance: [facility]  [facility]  Secondary Insurance:     PROGRESS NOTE:    [facility] [phone] - In order to check bed status clinicals first need to be faxe for their review  Fax: [phone]  YFN [facility] [phone] - left message    [facility] [phone] - No beds    [facility]MAN HANNA [phone] - No beds    [facility] in [address]  Requested clinicals are faxed to [phone]  Clinicals faxed to [facility]UL ALEX and [facility] via Elena

## 2023-06-16 NOTE — CASE MANAGEMENT
Case Management Progress Note    Patient name Theta Gross  Location ICU 15/ICU 15 MRN [id number]  : 1990 Date 2023       LOS (days): 2  Geometric Mean LOS (GMLOS) (days):   Days to GMLOS:        OBJECTIVE:        Current admission status: Inpatient  Preferred Pharmacy:   PATIENT/FAMILY REPORTS NO PREFERRED PHARMACY  No address on file      Primary Care Provider: Hossein Saldivar MD    Primary Insurance: [address] [place] [address]  Secondary Insurance:     PROGRESS NOTE:    Call received from Lady Ang @ [email or web address]  Facility able to accept patient  Accepting Provider: Mikey Neil  NPI: [id number]    [facility]  NPI: [id number]    Lady Ang confirmed patient will need COVID swab to admit to facility and authorization  PRE-CERTIFICATION INFORMATION  Insurance Authorization  Phone call placed to [address] [facility]   Phone number: [phone] option 2, option 2  Spoke to [facility]  5 days approved  Level of care: INPATIENT  SOC: 2023  NRD: 2023  NRW: Adithya Whitlock  @ [phone]  Authorization # [id number]  [place] confirmed that if transportation is unable to be secured before midnight tonight, accepting facility can call to have [facility] updated  Primary CM made aware of above, will follow for transport arrangements

## 2023-06-16 NOTE — PLAN OF CARE
Problem: MOBILITY - ADULT  Goal: Maintain or return to baseline ADL function  Description: INTERVENTIONS:  -  Assess patient's ability to carry out ADLs; assess patient's baseline for ADL function and identify physical deficits which impact ability to perform ADLs (bathing, care of mouth/teeth, toileting, grooming, dressing, etc )  - Assess/evaluate cause of self-care deficits   - Assess range of motion  - Assess patient's mobility; develop plan if impaired  - Assess patient's need for assistive devices and provide as appropriate  - Encourage maximum independence but intervene and supervise when necessary  - Involve family in performance of ADLs  - Assess for home care needs following discharge   - Consider OT consult to assist with ADL evaluation and planning for discharge  - Provide patient education as appropriate  6/16/2023 0043 by Zenon Lopez RN  Outcome: Progressing  6/15/2023 2243 by Zenon Lopez RN  Outcome: Progressing  Goal: Maintains/Returns to pre admission functional level  Description: INTERVENTIONS:  - Perform BMAT or MOVE assessment daily    - Set and communicate daily mobility goal to care team and patient/family/caregiver  - Collaborate with rehabilitation services on mobility goals if consulted  - Perform Range of Motion  times a day  - Reposition patient every  hours    - Dangle patient  times a day  - Stand patient  times a day  - Ambulate patient  times a day  - Out of bed to chair  times a day   - Out of bed for meals  times a day  - Out of bed for toileting  - Record patient progress and toleration of activity level   6/16/2023 0043 by Zenon Lopez RN  Outcome: Progressing  6/15/2023 2243 by Zenon Lopez RN  Outcome: Progressing     Problem: PAIN - ADULT  Goal: Verbalizes/displays adequate comfort level or baseline comfort level  Description: Interventions:  - Encourage patient to monitor pain and request assistance  - Assess pain using appropriate pain scale  - Administer analgesics based on type and severity of pain and evaluate response  - Implement non-pharmacological measures as appropriate and evaluate response  - Consider cultural and social influences on pain and pain management  - Notify physician/advanced practitioner if interventions unsuccessful or patient reports new pain  6/16/2023 0043 by Virgilio Barlow RN  Outcome: Progressing  6/15/2023 2243 by Virgilio Barlow RN  Outcome: Progressing     Problem: INFECTION - ADULT  Goal: Absence or prevention of progression during hospitalization  Description: INTERVENTIONS:  - Assess and monitor for signs and symptoms of infection  - Monitor lab/diagnostic results  - Monitor all insertion sites, i e  indwelling lines, tubes, and drains  - Monitor endotracheal if appropriate and nasal secretions for changes in amount and color  - [place] appropriate cooling/warming therapies per order  - Administer medications as ordered  - Instruct and encourage patient and family to use good hand hygiene technique  - Identify and instruct in appropriate isolation precautions for identified infection/condition  6/16/2023 0043 by Virgilio Barlow RN  Outcome: Progressing  6/15/2023 2243 by Virgilio Barlow RN  Outcome: Progressing  Goal: Absence of fever/infection during neutropenic period  Description: INTERVENTIONS:  - Monitor WBC    6/16/2023 0043 by Virgilio Barlow RN  Outcome: Progressing  6/15/2023 2243 by Virgilio Barlow RN  Outcome: Progressing     Problem: SAFETY ADULT  Goal: Maintain or return to baseline ADL function  Description: INTERVENTIONS:  -  Assess patient's ability to carry out ADLs; assess patient's baseline for ADL function and identify physical deficits which impact ability to perform ADLs (bathing, care of mouth/teeth, toileting, grooming, dressing, etc )  - Assess/evaluate cause of self-care deficits   - Assess range of motion  - Assess patient's mobility; develop plan if impaired  - Assess patient's need for assistive devices and provide as appropriate  - Encourage maximum independence but intervene and supervise when necessary  - Involve family in performance of ADLs  - Assess for home care needs following discharge   - Consider OT consult to assist with ADL evaluation and planning for discharge  - Provide patient education as appropriate  6/16/2023 0043 by Austin Canales RN  Outcome: Progressing  6/15/2023 2243 by Austin Canales RN  Outcome: Progressing  Goal: Maintains/Returns to pre admission functional level  Description: INTERVENTIONS:  - Perform BMAT or MOVE assessment daily    - Set and communicate daily mobility goal to care team and patient/family/caregiver  - Collaborate with rehabilitation services on mobility goals if consulted  - Perform Range of Motion  times a day  - Reposition patient every  hours    - Dangle patient  times a day  - Stand patient  times a day  - Ambulate patient  times a day  - Out of bed to chair  times a day   - Out of bed for meals  times a day  - Out of bed for toileting  - Record patient progress and toleration of activity level   6/16/2023 0043 by Austin Canales RN  Outcome: Progressing  6/15/2023 2243 by Austin Canales RN  Outcome: Progressing  Goal: Patient will remain free of falls  Description: INTERVENTIONS:  - Educate patient/family on patient safety including physical limitations  - Instruct patient to call for assistance with activity   - Consult OT/PT to assist with strengthening/mobility   - Keep Call bell within reach  - Keep bed low and locked with side rails adjusted as appropriate  - Keep care items and personal belongings within reach  - Initiate and maintain comfort rounds  - Make Fall Risk Sign visible to staff  - Offer Toileting every  Hours, in advance of need  - Initiate/Maintain alarm  - Obtain necessary fall risk management equipment:   - Apply yellow socks and bracelet for high fall risk patients  - Consider moving patient to room near nurses station  6/16/2023 0043 by Calixto Sneed RN  Outcome: Progressing  6/15/2023 2243 by Calixto Sneed RN  Outcome: Progressing     Problem: DISCHARGE PLANNING  Goal: Discharge to home or other facility with appropriate resources  Description: INTERVENTIONS:  - Identify barriers to discharge w/patient and caregiver  - Arrange for needed discharge resources and transportation as appropriate  - Identify discharge learning needs (meds, wound care, etc )  - Arrange for interpretive services to assist at discharge as needed  - Refer to Case Management Department for coordinating discharge planning if the patient needs post-hospital services based on physician/advanced practitioner order or complex needs related to functional status, cognitive ability, or social support system  6/16/2023 0043 by Calixto Sneed RN  Outcome: Progressing  6/15/2023 2243 by Calixto Sneed RN  Outcome: Progressing     Problem: Knowledge Deficit  Goal: Patient/family/caregiver demonstrates understanding of disease process, treatment plan, medications, and discharge instructions  Description: Complete learning assessment and assess knowledge base  Interventions:  - Provide teaching at level of understanding  - Provide teaching via preferred learning methods  6/16/2023 0043 by Calixto Sneed RN  Outcome: Progressing  6/15/2023 2243 by Calixto Sneed RN  Outcome: Progressing     Problem: Nutrition/Hydration-ADULT  Goal: Nutrient/Hydration intake appropriate for improving, restoring or maintaining nutritional needs  Description: Monitor and assess patient's nutrition/hydration status for malnutrition  Collaborate with interdisciplinary team and initiate plan and interventions as ordered  Monitor patient's weight and dietary intake as ordered or per policy   Utilize nutrition screening tool and intervene as necessary  Determine patient's food preferences and provide high-protein, high-caloric foods as appropriate       INTERVENTIONS:  - Monitor oral intake, urinary output, labs, and treatment plans  - Assess nutrition and hydration status and recommend course of action  - Evaluate amount of meals eaten  - Assist patient with eating if necessary   - Allow adequate time for meals  - Recommend/ encourage appropriate diets, oral nutritional supplements, and vitamin/mineral supplements  - Order, calculate, and assess calorie counts as needed  - Assess need for intravenous fluids  - Provide nutrition/hydration education as appropriate  - Include patient/family/caregiver in decisions related to nutrition  6/16/2023 0043 by Taya Barnes RN  Outcome: Progressing  6/15/2023 2243 by Taya Barnes RN  Outcome: Progressing

## 2023-06-17 VITALS
HEIGHT: 72 IN | OXYGEN SATURATION: 95 % | WEIGHT: 178.13 LBS | RESPIRATION RATE: 14 BRPM | DIASTOLIC BLOOD PRESSURE: 65 MMHG | SYSTOLIC BLOOD PRESSURE: 102 MMHG | BODY MASS INDEX: 24.13 KG/M2 | HEART RATE: 54 BPM | TEMPERATURE: 97.6 F

## 2023-06-17 PROCEDURE — 99239 HOSP IP/OBS DSCHRG MGMT >30: CPT | Performed by: INTERNAL MEDICINE

## 2023-06-17 RX ORDER — OXYCODONE HYDROCHLORIDE 5 MG/1
5 TABLET ORAL EVERY 4 HOURS PRN
Qty: 12 TABLET | Refills: 0 | Status: SHIPPED | OUTPATIENT
Start: 2023-06-17 | End: 2023-06-20

## 2023-06-17 RX ADMIN — CHLORHEXIDINE GLUCONATE 15 ML: 1.2 SOLUTION ORAL at 08:41

## 2023-06-17 RX ADMIN — BUSPIRONE HYDROCHLORIDE 10 MG: 5 TABLET ORAL at 08:41

## 2023-06-17 RX ADMIN — ACETAMINOPHEN 975 MG: 325 TABLET, FILM COATED ORAL at 08:41

## 2023-06-17 RX ADMIN — SERTRALINE HYDROCHLORIDE 100 MG: 50 TABLET ORAL at 08:41

## 2023-06-17 RX ADMIN — OXYCODONE HYDROCHLORIDE 5 MG: 5 TABLET ORAL at 01:13

## 2023-06-17 RX ADMIN — GABAPENTIN 600 MG: 300 CAPSULE ORAL at 08:41

## 2023-06-17 RX ADMIN — BACITRACIN 1 LARGE APPLICATION: 500 OINTMENT TOPICAL at 08:42

## 2023-06-17 RX ADMIN — BUPROPION HYDROCHLORIDE 300 MG: 150 TABLET, FILM COATED, EXTENDED RELEASE ORAL at 08:41

## 2023-06-17 RX ADMIN — OXYCODONE HYDROCHLORIDE 5 MG: 5 TABLET ORAL at 07:41

## 2023-06-17 NOTE — CASE MANAGEMENT
Case Management Discharge Planning Note    Patient name Ny Mcgraw  Location S /S -01 MRN [id number]  : 1990 Date 2023       Current Admission Date: 2023  Current Admission Diagnosis:Suicide attempt [facility])   Patient Active Problem List    Diagnosis Date Noted   • Allergic reaction 2023   • Anxiety and depression 2023   • EtOH dependence ([facility] [address] ) 2023   • Suicide attempt ([facility] [address] ) 2023   • Abrasions of multiple sites 2023      LOS (days): 3  Geometric Mean LOS (GMLOS) (days):   Days to GMLOS:     OBJECTIVE:  Risk of Unplanned Readmission Score: 10 92         Current admission status: Inpatient   Preferred Pharmacy:   PATIENT/FAMILY REPORTS NO PREFERRED PHARMACY  No address on file      Primary Care Provider: Nixon Hernandez MD    Primary Insurance: [address] [place] [address]  Secondary Insurance:     DISCHARGE DETAILS:    Accepting Facility Name, [facility] : [address]  Receiving Facility/Agency Phone Number: [phone] [facility] 2645  Facility/Agency Fax Number: [phone]     Additional Comments: EMILY s/w Gianna Louis @ [facility] to confirm transport arrangement for 1000 AM pick-up today  COVID results faxed
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
## 2023-06-17 NOTE — ASSESSMENT & PLAN NOTE
-Pt developed hypotension and then rash roughly 2 5 hours after receiving IV contrast and 3 minutes after receiving Tdap vaccination  -Resolved s/p IVF resuscitation, solu-medrol, IV Benadryl  -Given timeline most likely 2/2 Tdap    Plan  -Continue to monitor vitals  -avoid Tdap vaccinations in the future   Caution w/ other vaccinations and IV contrast  Addended by: LUKE DOW on: 8/14/2020 11:15 AM     Modules accepted: Orders

## 2023-06-17 NOTE — PLAN OF CARE
Problem: MOBILITY - ADULT  Goal: Maintain or return to baseline ADL function  Description: INTERVENTIONS:  -  Assess patient's ability to carry out ADLs; assess patient's baseline for ADL function and identify physical deficits which impact ability to perform ADLs (bathing, care of mouth/teeth, toileting, grooming, dressing, etc )  - Assess/evaluate cause of self-care deficits   - Assess range of motion  - Assess patient's mobility; develop plan if impaired  - Assess patient's need for assistive devices and provide as appropriate  - Encourage maximum independence but intervene and supervise when necessary  - Involve family in performance of ADLs  - Assess for home care needs following discharge   - Consider OT consult to assist with ADL evaluation and planning for discharge  - Provide patient education as appropriate  Outcome: Progressing  Goal: Maintains/Returns to pre admission functional level  Description: INTERVENTIONS:  - Perform BMAT or MOVE assessment daily    - Set and communicate daily mobility goal to care team and patient/family/caregiver     - Collaborate with rehabilitation services on mobility goals if consulted  - Out of bed for toileting  - Record patient progress and toleration of activity level   Outcome: Progressing     Problem: PAIN - ADULT  Goal: Verbalizes/displays adequate comfort level or baseline comfort level  Description: Interventions:  - Encourage patient to monitor pain and request assistance  - Assess pain using appropriate pain scale  - Administer analgesics based on type and severity of pain and evaluate response  - Implement non-pharmacological measures as appropriate and evaluate response  - Consider cultural and social influences on pain and pain management  - Notify physician/advanced practitioner if interventions unsuccessful or patient reports new pain  Outcome: Progressing     Problem: INFECTION - ADULT  Goal: Absence or prevention of progression during hospitalization  Description: INTERVENTIONS:  - Assess and monitor for signs and symptoms of infection  - Monitor lab/diagnostic results  - Monitor all insertion sites, i e  indwelling lines, tubes, and drains  - Monitor endotracheal if appropriate and nasal secretions for changes in amount and color  - [place] appropriate cooling/warming therapies per order  - Administer medications as ordered  - Instruct and encourage patient and family to use good hand hygiene technique  - Identify and instruct in appropriate isolation precautions for identified infection/condition  Outcome: Progressing  Goal: Absence of fever/infection during neutropenic period  Description: INTERVENTIONS:  - Monitor WBC    Outcome: Progressing     Problem: SAFETY ADULT  Goal: Maintain or return to baseline ADL function  Description: INTERVENTIONS:  -  Assess patient's ability to carry out ADLs; assess patient's baseline for ADL function and identify physical deficits which impact ability to perform ADLs (bathing, care of mouth/teeth, toileting, grooming, dressing, etc )  - Assess/evaluate cause of self-care deficits   - Assess range of motion  - Assess patient's mobility; develop plan if impaired  - Assess patient's need for assistive devices and provide as appropriate  - Encourage maximum independence but intervene and supervise when necessary  - Involve family in performance of ADLs  - Assess for home care needs following discharge   - Consider OT consult to assist with ADL evaluation and planning for discharge  - Provide patient education as appropriate  Outcome: Progressing  Goal: Maintains/Returns to pre admission functional level  Description: INTERVENTIONS:  - Perform BMAT or MOVE assessment daily    - Set and communicate daily mobility goal to care team and patient/family/caregiver     - Collaborate with rehabilitation services on mobility goals if consulted    - Out of bed for toileting  - Record patient progress and toleration of activity level   Outcome: Progressing  Goal: Patient will remain free of falls  Description: INTERVENTIONS:  - Educate patient/family on patient safety including physical limitations  - Instruct patient to call for assistance with activity   - Consult OT/PT to assist with strengthening/mobility   - Keep Call bell within reach  - Keep bed low and locked with side rails adjusted as appropriate  - Keep care items and personal belongings within reach  - Initiate and maintain comfort rounds  - Make Fall Risk Sign visible to staff  - Obtain necessary fall risk management equipment  - Apply yellow socks and bracelet for high fall risk patients  - Consider moving patient to room near nurses station  Outcome: Progressing     Problem: DISCHARGE PLANNING  Goal: Discharge to home or other facility with appropriate resources  Description: INTERVENTIONS:  - Identify barriers to discharge w/patient and caregiver  - Arrange for needed discharge resources and transportation as appropriate  - Identify discharge learning needs (meds, wound care, etc )  - Arrange for interpretive services to assist at discharge as needed  - Refer to Case Management Department for coordinating discharge planning if the patient needs post-hospital services based on physician/advanced practitioner order or complex needs related to functional status, cognitive ability, or social support system  Outcome: Progressing     Problem: Knowledge Deficit  Goal: Patient/family/caregiver demonstrates understanding of disease process, treatment plan, medications, and discharge instructions  Description: Complete learning assessment and assess knowledge base    Interventions:  - Provide teaching at level of understanding  - Provide teaching via preferred learning methods  Outcome: Progressing     Problem: Nutrition/Hydration-ADULT  Goal: Nutrient/Hydration intake appropriate for improving, restoring or maintaining nutritional needs  Description: Monitor and assess patient's nutrition/hydration status for malnutrition  Collaborate with interdisciplinary team and initiate plan and interventions as ordered  Monitor patient's weight and dietary intake as ordered or per policy  Utilize nutrition screening tool and intervene as necessary  Determine patient's food preferences and provide high-protein, high-caloric foods as appropriate       INTERVENTIONS:  - Monitor oral intake, urinary output, labs, and treatment plans  - Assess nutrition and hydration status and recommend course of action  - Evaluate amount of meals eaten  - Assist patient with eating if necessary   - Allow adequate time for meals  - Recommend/ encourage appropriate diets, oral nutritional supplements, and vitamin/mineral supplements  - Order, calculate, and assess calorie counts as needed  - Assess need for intravenous fluids  - Provide nutrition/hydration education as appropriate  - Include patient/family/caregiver in decisions related to nutrition  Outcome: Progressing

## 2023-06-17 NOTE — PLAN OF CARE
Problem: MOBILITY - ADULT  Goal: Maintain or return to baseline ADL function  Description: INTERVENTIONS:  -  Assess patient's ability to carry out ADLs; assess patient's baseline for ADL function and identify physical deficits which impact ability to perform ADLs (bathing, care of mouth/teeth, toileting, grooming, dressing, etc )  - Assess/evaluate cause of self-care deficits   - Assess range of motion  - Assess patient's mobility; develop plan if impaired  - Assess patient's need for assistive devices and provide as appropriate  - Encourage maximum independence but intervene and supervise when necessary  - Involve family in performance of ADLs  - Assess for home care needs following discharge   - Consider OT consult to assist with ADL evaluation and planning for discharge  - Provide patient education as appropriate  Outcome: Progressing  Goal: Maintains/Returns to pre admission functional level  Description: INTERVENTIONS:  - Perform BMAT or MOVE assessment daily    - Set and communicate daily mobility goal to care team and patient/family/caregiver  - Collaborate with rehabilitation services on mobility goals if consulted  - Perform Range of Motion times a day  - Reposition patient every  hours    - Dangle patient  times a day  - Stand patient times a day  - Ambulate patient times a day  - Out of bed to chair  times a day   - Out of bed for meals  times a day  - Out of bed for toileting  - Record patient progress and toleration of activity level   Outcome: Progressing     Problem: PAIN - ADULT  Goal: Verbalizes/displays adequate comfort level or baseline comfort level  Description: Interventions:  - Encourage patient to monitor pain and request assistance  - Assess pain using appropriate pain scale  - Administer analgesics based on type and severity of pain and evaluate response  - Implement non-pharmacological measures as appropriate and evaluate response  - Consider cultural and social influences on pain and pain management  - Notify physician/advanced practitioner if interventions unsuccessful or patient reports new pain  Outcome: Progressing     Problem: INFECTION - ADULT  Goal: Absence or prevention of progression during hospitalization  Description: INTERVENTIONS:  - Assess and monitor for signs and symptoms of infection  - Monitor lab/diagnostic results  - Monitor all insertion sites, i e  indwelling lines, tubes, and drains  - Monitor endotracheal if appropriate and nasal secretions for changes in amount and color  - [place] appropriate cooling/warming therapies per order  - Administer medications as ordered  - Instruct and encourage patient and family to use good hand hygiene technique  - Identify and instruct in appropriate isolation precautions for identified infection/condition  Outcome: Progressing  Goal: Absence of fever/infection during neutropenic period  Description: INTERVENTIONS:  - Monitor WBC    Outcome: Progressing     Problem: SAFETY ADULT  Goal: Maintain or return to baseline ADL function  Description: INTERVENTIONS:  -  Assess patient's ability to carry out ADLs; assess patient's baseline for ADL function and identify physical deficits which impact ability to perform ADLs (bathing, care of mouth/teeth, toileting, grooming, dressing, etc )  - Assess/evaluate cause of self-care deficits   - Assess range of motion  - Assess patient's mobility; develop plan if impaired  - Assess patient's need for assistive devices and provide as appropriate  - Encourage maximum independence but intervene and supervise when necessary  - Involve family in performance of ADLs  - Assess for home care needs following discharge   - Consider OT consult to assist with ADL evaluation and planning for discharge  - Provide patient education as appropriate  Outcome: Progressing  Goal: Maintains/Returns to pre admission functional level  Description: INTERVENTIONS:  - Perform BMAT or MOVE assessment daily    - Set and communicate daily mobility goal to care team and patient/family/caregiver  - Collaborate with rehabilitation services on mobility goals if consulted  - Perform Range of Motion  times a day  - Reposition patient every  hours  - Dangle patient  times a day  - Stand patient times a day  - Ambulate patient times a day  - Out of bed to chair  times a day   - Out of bed for meals  times a day  - Out of bed for toileting  - Record patient progress and toleration of activity level   Outcome: Progressing  Goal: Patient will remain free of falls  Description: INTERVENTIONS:  - Educate patient/family on patient safety including physical limitations  - Instruct patient to call for assistance with activity   - Consult OT/PT to assist with strengthening/mobility   - Keep Call bell within reach  - Keep bed low and locked with side rails adjusted as appropriate  - Keep care items and personal belongings within reach  - Initiate and maintain comfort rounds  - Make Fall Risk Sign visible to staff  - Offer Toileting every Hours, in advance of need  - Initiate/Maintain alarm  - Obtain necessary fall risk management equipment:   - Apply yellow socks and bracelet for high fall risk patients  - Consider moving patient to room near nurses station  Outcome: Progressing     Problem: Knowledge Deficit  Goal: Patient/family/caregiver demonstrates understanding of disease process, treatment plan, medications, and discharge instructions  Description: Complete learning assessment and assess knowledge base    Interventions:  - Provide teaching at level of understanding  - Provide teaching via preferred learning methods  Outcome: Progressing
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
## 2023-06-19 NOTE — UTILIZATION REVIEW
NOTIFICATION OF ADMISSION DISCHARGE   This is a Notification of Discharge from [address]  Please be advised that this patient has been discharge from our facility  Below you will find the admission and discharge date and time including the patient’s disposition  UTILIZATION REVIEW CONTACT:  Ry Yoo MA  Utilization   Network Utilization Review Department  Phone: [phone] x carefully listen to the prompts  All voicemails are confidential   Email: [email or web address]     ADMISSION INFORMATION  PRESENTATION DATE: 6/13/2023 11:01 PM  OBERVATION ADMISSION DATE:   INPATIENT ADMISSION DATE: 6/14/23  4:50 AM   DISCHARGE DATE: 6/17/2023 11:07 AM   DISPOSITION:Non [facility]    IMPORTANT INFORMATION:  Send all requests for admission clinical reviews, approved or denied determinations and any other requests to dedicated fax number below belonging to the campus where the patient is receiving treatment   List of dedicated fax numbers:  [address] DENIALS (Administrative/Medical Necessity) [phone]   [address] (Maternity/NICU/Pediatrics) [phone]   [facility] [phone]   [facility] [phone]   [address] [phone]   [address] [facility] [phone]   [address] [place] [address] [phone]   [address] [place] [address] 119 [phone]   [facility]  [phone] 405[address] [facility] [phone]   412 [facility] [address] [facility] [phone]

## 2023-06-22 LAB
BASE EXCESS BLDA CALC-SCNC: -2 MMOL/L (ref -2–3)
CA-I BLD-SCNC: 1.18 MMOL/L (ref 1.12–1.32)
GLUCOSE SERPL-MCNC: 88 MG/DL (ref 65–140)
HCO3 BLDA-SCNC: 24.4 MMOL/L (ref 24–30)
HCT VFR BLD CALC: 43 % (ref 36.5–49.3)
HGB BLDA-MCNC: 14.6 G/DL (ref 12–17)
PCO2 BLD: 26 MMOL/L (ref 21–32)
PCO2 BLD: 48.7 MM HG (ref 42–50)
PH BLD: 7.31 [PH] (ref 7.3–7.4)
PO2 BLD: 46 MM HG (ref 35–45)
POTASSIUM BLD-SCNC: 4 MMOL/L (ref 3.5–5.3)
SAO2 % BLD FROM PO2: 77 % (ref 60–85)
SODIUM BLD-SCNC: 147 MMOL/L (ref 136–145)
SPECIMEN SOURCE: ABNORMAL

## 2023-07-22 ENCOUNTER — HOSPITAL ENCOUNTER (EMERGENCY)
Facility: HOSPITAL | Age: 33
Discharge: HOME | End: 2023-07-22
Attending: EMERGENCY MEDICINE
Payer: COMMERCIAL

## 2023-07-22 ENCOUNTER — APPOINTMENT (EMERGENCY)
Dept: RADIOLOGY | Facility: HOSPITAL | Age: 33
End: 2023-07-22
Payer: COMMERCIAL

## 2023-07-22 VITALS
OXYGEN SATURATION: 99 % | TEMPERATURE: 98.4 F | RESPIRATION RATE: 22 BRPM | SYSTOLIC BLOOD PRESSURE: 152 MMHG | DIASTOLIC BLOOD PRESSURE: 76 MMHG | HEART RATE: 89 BPM

## 2023-07-22 DIAGNOSIS — N23 URETERAL COLIC: ICD-10-CM

## 2023-07-22 DIAGNOSIS — N13.2 URETERAL STONE WITH HYDRONEPHROSIS: Primary | ICD-10-CM

## 2023-07-22 LAB
ALBUMIN SERPL BCP-MCNC: 4.2 G/DL (ref 3.5–5)
ALP SERPL-CCNC: 76 U/L (ref 34–104)
ALT SERPL W P-5'-P-CCNC: 14 U/L (ref 7–52)
AMPHETAMINES SERPL QL SCN: NEGATIVE
ANION GAP SERPL CALCULATED.3IONS-SCNC: 12 MMOL/L
AST SERPL W P-5'-P-CCNC: 17 U/L (ref 13–39)
BACTERIA UR QL AUTO: ABNORMAL /HPF
BARBITURATES UR QL: NEGATIVE
BASOPHILS # BLD AUTO: 0.04 THOUSANDS/ÂΜL (ref 0–0.1)
BASOPHILS NFR BLD AUTO: 1 % (ref 0–1)
BENZODIAZ UR QL: POSITIVE
BILIRUB SERPL-MCNC: 0.54 MG/DL (ref 0.2–1)
BILIRUB UR QL STRIP: NEGATIVE
BUN SERPL-MCNC: 10 MG/DL (ref 5–25)
CALCIUM SERPL-MCNC: 9.2 MG/DL (ref 8.4–10.2)
CHLORIDE SERPL-SCNC: 108 MMOL/L (ref 96–108)
CLARITY UR: CLEAR
CO2 SERPL-SCNC: 19 MMOL/L (ref 21–32)
COCAINE UR QL: NEGATIVE
COLOR UR: YELLOW
CREAT SERPL-MCNC: 1.33 MG/DL (ref 0.6–1.3)
EOSINOPHIL # BLD AUTO: 0.11 THOUSAND/ÂΜL (ref 0–0.61)
EOSINOPHIL NFR BLD AUTO: 1 % (ref 0–6)
ERYTHROCYTE [DISTWIDTH] IN BLOOD BY AUTOMATED COUNT: 13 % (ref 11.6–15.1)
GFR SERPL CREATININE-BSD FRML MDRD: 68 ML/MIN/1.73SQ M
GLUCOSE SERPL-MCNC: 106 MG/DL (ref 65–140)
GLUCOSE UR STRIP-MCNC: NEGATIVE MG/DL
HCT VFR BLD AUTO: 41.5 % (ref 36.5–49.3)
HGB BLD-MCNC: 15 G/DL (ref 12–17)
HGB UR QL STRIP.AUTO: ABNORMAL
IMM GRANULOCYTES # BLD AUTO: 0.02 THOUSAND/UL (ref 0–0.2)
IMM GRANULOCYTES NFR BLD AUTO: 0 % (ref 0–2)
KETONES UR STRIP-MCNC: NEGATIVE MG/DL
LEUKOCYTE ESTERASE UR QL STRIP: NEGATIVE
LYMPHOCYTES # BLD AUTO: 2.77 THOUSANDS/ÂΜL (ref 0.6–4.47)
LYMPHOCYTES NFR BLD AUTO: 35 % (ref 14–44)
MCH RBC QN AUTO: 30.5 PG (ref 26.8–34.3)
MCHC RBC AUTO-ENTMCNC: 36.1 G/DL (ref 31.4–37.4)
MCV RBC AUTO: 84 FL (ref 82–98)
METHADONE UR QL: NEGATIVE
MONOCYTES # BLD AUTO: 0.67 THOUSAND/ÂΜL (ref 0.17–1.22)
MONOCYTES NFR BLD AUTO: 8 % (ref 4–12)
NEUTROPHILS # BLD AUTO: 4.36 THOUSANDS/ÂΜL (ref 1.85–7.62)
NEUTS SEG NFR BLD AUTO: 55 % (ref 43–75)
NITRITE UR QL STRIP: NEGATIVE
NON-SQ EPI CELLS URNS QL MICRO: ABNORMAL /HPF
NRBC BLD AUTO-RTO: 0 /100 WBCS
OPIATES UR QL SCN: POSITIVE
OXYCODONE+OXYMORPHONE UR QL SCN: NEGATIVE
PCP UR QL: NEGATIVE
PH UR STRIP.AUTO: 8 [PH]
PLATELET # BLD AUTO: 222 THOUSANDS/UL (ref 149–390)
PMV BLD AUTO: 9.5 FL (ref 8.9–12.7)
POTASSIUM SERPL-SCNC: 3.4 MMOL/L (ref 3.5–5.3)
PROT SERPL-MCNC: 7.1 G/DL (ref 6.4–8.4)
PROT UR STRIP-MCNC: ABNORMAL MG/DL
RBC # BLD AUTO: 4.92 MILLION/UL (ref 3.88–5.62)
RBC #/AREA URNS AUTO: ABNORMAL /HPF
SODIUM SERPL-SCNC: 139 MMOL/L (ref 135–147)
SP GR UR STRIP.AUTO: 1.02 (ref 1–1.03)
THC UR QL: NEGATIVE
UROBILINOGEN UR QL STRIP.AUTO: 1 E.U./DL
WBC # BLD AUTO: 7.97 THOUSAND/UL (ref 4.31–10.16)
WBC #/AREA URNS AUTO: ABNORMAL /HPF

## 2023-07-22 PROCEDURE — 81001 URINALYSIS AUTO W/SCOPE: CPT | Performed by: EMERGENCY MEDICINE

## 2023-07-22 PROCEDURE — G1004 CDSM NDSC: HCPCS

## 2023-07-22 PROCEDURE — 80053 COMPREHEN METABOLIC PANEL: CPT | Performed by: EMERGENCY MEDICINE

## 2023-07-22 PROCEDURE — 80307 DRUG TEST PRSMV CHEM ANLYZR: CPT | Performed by: EMERGENCY MEDICINE

## 2023-07-22 PROCEDURE — 74176 CT ABD & PELVIS W/O CONTRAST: CPT

## 2023-07-22 PROCEDURE — 96375 TX/PRO/DX INJ NEW DRUG ADDON: CPT

## 2023-07-22 PROCEDURE — 96361 HYDRATE IV INFUSION ADD-ON: CPT

## 2023-07-22 PROCEDURE — 36415 COLL VENOUS BLD VENIPUNCTURE: CPT | Performed by: EMERGENCY MEDICINE

## 2023-07-22 PROCEDURE — 99284 EMERGENCY DEPT VISIT MOD MDM: CPT

## 2023-07-22 PROCEDURE — 85025 COMPLETE CBC W/AUTO DIFF WBC: CPT | Performed by: EMERGENCY MEDICINE

## 2023-07-22 PROCEDURE — 96374 THER/PROPH/DIAG INJ IV PUSH: CPT

## 2023-07-22 RX ORDER — IBUPROFEN 600 MG/1
600 TABLET ORAL EVERY 6 HOURS PRN
Qty: 30 TABLET | Refills: 0 | Status: SHIPPED | OUTPATIENT
Start: 2023-07-22 | End: 2023-07-28

## 2023-07-22 RX ORDER — TAMSULOSIN HYDROCHLORIDE 0.4 MG/1
0.4 CAPSULE ORAL
Qty: 21 CAPSULE | Refills: 0 | Status: SHIPPED | OUTPATIENT
Start: 2023-07-22 | End: 2023-07-28

## 2023-07-22 RX ORDER — HYDROCODONE BITARTRATE AND ACETAMINOPHEN 5; 325 MG/1; MG/1
1 TABLET ORAL EVERY 6 HOURS PRN
Qty: 8 TABLET | Refills: 0 | Status: SHIPPED | OUTPATIENT
Start: 2023-07-22 | End: 2023-07-28

## 2023-07-22 RX ORDER — MORPHINE SULFATE 4 MG/ML
4 INJECTION, SOLUTION INTRAMUSCULAR; INTRAVENOUS ONCE
Status: COMPLETED | OUTPATIENT
Start: 2023-07-22 | End: 2023-07-22

## 2023-07-22 RX ORDER — TAMSULOSIN HYDROCHLORIDE 0.4 MG/1
0.4 CAPSULE ORAL ONCE
Status: COMPLETED | OUTPATIENT
Start: 2023-07-22 | End: 2023-07-22

## 2023-07-22 RX ORDER — KETOROLAC TROMETHAMINE 30 MG/ML
15 INJECTION, SOLUTION INTRAMUSCULAR; INTRAVENOUS ONCE
Status: COMPLETED | OUTPATIENT
Start: 2023-07-22 | End: 2023-07-22

## 2023-07-22 RX ADMIN — MORPHINE SULFATE 4 MG: 4 INJECTION INTRAVENOUS at 21:26

## 2023-07-22 RX ADMIN — TAMSULOSIN HYDROCHLORIDE 0.4 MG: 0.4 CAPSULE ORAL at 22:01

## 2023-07-22 RX ADMIN — SODIUM CHLORIDE 1000 ML: 0.9 INJECTION, SOLUTION INTRAVENOUS at 18:22

## 2023-07-22 RX ADMIN — KETOROLAC TROMETHAMINE 15 MG: 30 INJECTION, SOLUTION INTRAMUSCULAR at 18:19

## 2023-07-22 NOTE — Clinical Note
Esha Marques was seen and treated in our emergency department on 7/22/2023. Diagnosis:     Michelle Hilton  may return to work on return date. He may return on this date: 07/25/2023         If you have any questions or concerns, please don't hesitate to call.       Irma Garcia, DO    ______________________________           _______________          _______________  Hospital Representative                              Date                                Time

## 2023-07-22 NOTE — ED PROVIDER NOTES
History  Chief Complaint   Patient presents with   • Groin Pain     Right sided groin pain for about an hour      Patient is a 78-year-old male that presents to the emergency department with EMS for evaluation of right-sided groin pain. Symptoms began approximately 1 hour ago. Patient is writhing in pain at the time of initial evaluation. He has not taken any medication for relief. He denies a history of similar pain. Differential diagnosis includes but is not limited to ureteral colic, appendicitis, testicular torsion, cystitis. History provided by:  Patient   used: No    Groin Pain  Presenting symptoms: no dysuria    Associated symptoms: abdominal pain and groin pain    Associated symptoms: no diarrhea, no fever, no flank pain, no hematuria, no nausea and no vomiting        Prior to Admission Medications   Prescriptions Last Dose Informant Patient Reported? Taking? QUEtiapine (SEROquel) 50 mg tablet   No No   Sig: Take 1 tablet (50 mg total) by mouth daily at bedtime   acetaminophen (TYLENOL) 325 mg tablet   No No   Sig: Take 3 tablets (975 mg total) by mouth every 8 (eight) hours   buPROPion (WELLBUTRIN XL) 300 mg 24 hr tablet   Yes No   Sig: Take 300 mg by mouth daily   busPIRone (BUSPAR) 10 mg tablet   Yes No   Sig: Take 10 mg by mouth 3 (three) times a day   gabapentin (NEURONTIN) 600 MG tablet   Yes No   Sig: Take 600 mg by mouth 3 (three) times a day   ondansetron (ZOFRAN) 4 mg/2 mL injection   No No   Sig: Inject 2 mL (4 mg total) into a catheter in a vein every 6 (six) hours as needed for nausea or vomiting   sertraline (ZOLOFT) 100 mg tablet   Yes No   Sig: Take 100 mg by mouth daily   sertraline (ZOLOFT) 100 mg tablet   Yes No   Sig: Take 100 mg by mouth daily      Facility-Administered Medications: None       Past Medical History:   Diagnosis Date   • Depression    • ETOH abuse        History reviewed. No pertinent surgical history. History reviewed.  No pertinent family history. I have reviewed and agree with the history as documented. E-Cigarette/Vaping   • E-Cigarette Use Never User      E-Cigarette/Vaping Substances   • Nicotine No    • THC No    • CBD No    • Flavoring No    • Other No    • Unknown No      Social History     Tobacco Use   • Smoking status: Never   • Smokeless tobacco: Never   Vaping Use   • Vaping Use: Never used   Substance Use Topics   • Alcohol use: Yes     Alcohol/week: 6.0 standard drinks of alcohol     Types: 2 Glasses of wine, 4 Cans of beer per week     Comment: 2 pints of vodka or more with binge drinking    • Drug use: Never       Review of Systems   Constitutional: Negative for chills and fever. Respiratory: Negative for cough, shortness of breath and wheezing. Cardiovascular: Negative for chest pain and palpitations. Gastrointestinal: Positive for abdominal pain. Negative for constipation, diarrhea, nausea and vomiting. Genitourinary: Negative for dysuria, flank pain, hematuria and urgency. Musculoskeletal: Negative for back pain. Skin: Negative for color change and rash. All other systems reviewed and are negative. Physical Exam  Physical Exam  Vitals and nursing note reviewed. Exam conducted with a chaperone present Rich Couch). Constitutional:       Appearance: He is well-developed. HENT:      Head: Normocephalic and atraumatic. Eyes:      Pupils: Pupils are equal, round, and reactive to light. Cardiovascular:      Rate and Rhythm: Normal rate and regular rhythm. Heart sounds: Normal heart sounds. Pulmonary:      Effort: Pulmonary effort is normal.      Breath sounds: Normal breath sounds. Abdominal:      General: Bowel sounds are normal. There is no distension. Palpations: Abdomen is soft. There is no mass. Tenderness: There is abdominal tenderness in the right lower quadrant. There is right CVA tenderness. There is no left CVA tenderness, guarding or rebound.    Genitourinary:     Penis: Normal and circumcised. Testes: Normal. Cremasteric reflex is present. Right: Tenderness, swelling, testicular hydrocele or varicocele not present. Cremasteric reflex is present. Left: Tenderness, swelling or testicular hydrocele not present. Cremasteric reflex is present. Musculoskeletal:      Cervical back: Normal range of motion and neck supple. Skin:     General: Skin is warm and dry. Capillary Refill: Capillary refill takes less than 2 seconds. Neurological:      Mental Status: He is alert and oriented to person, place, and time. Psychiatric:         Behavior: Behavior normal.         Thought Content:  Thought content normal.         Judgment: Judgment normal.         Vital Signs  ED Triage Vitals [07/22/23 1803]   Temperature Pulse Respirations Blood Pressure SpO2   98.4 °F (36.9 °C) 89 22 152/76 99 %      Temp Source Heart Rate Source Patient Position - Orthostatic VS BP Location FiO2 (%)   Oral Monitor Lying Left arm --      Pain Score       10 - Worst Possible Pain           Vitals:    07/22/23 1803   BP: 152/76   Pulse: 89   Patient Position - Orthostatic VS: Lying         Visual Acuity      ED Medications  Medications   ketorolac (TORADOL) injection 15 mg (15 mg Intravenous Given 7/22/23 1819)   sodium chloride 0.9 % bolus 1,000 mL (0 mL Intravenous Stopped 7/22/23 2202)   morphine injection 4 mg (4 mg Intravenous Given 7/22/23 2126)   tamsulosin (FLOMAX) capsule 0.4 mg (0.4 mg Oral Given 7/22/23 2201)       Diagnostic Studies  Results Reviewed     Procedure Component Value Units Date/Time    Rapid drug screen, urine [[id number]]  (Abnormal) Collected: 07/22/23 1819    Lab Status: Final result Specimen: Urine, Clean Catch Updated: 07/22/23 1851     Amph/Meth UR Negative     Barbiturate Ur Negative     Benzodiazepine Urine Positive     Cocaine Urine Negative     Methadone Urine Negative     Opiate Urine Positive     PCP Ur Negative     THC Urine Negative     Oxycodone Urine Negative    Narrative:      Presumptive report. If requested, specimen will be sent to reference lab for confirmation. FOR MEDICAL PURPOSES ONLY. IF CONFIRMATION NEEDED PLEASE CONTACT THE LAB WITHIN 5 DAYS.     Drug Screen Cutoff Levels:  AMPHETAMINE/METHAMPHETAMINES  1000 ng/mL  BARBITURATES     200 ng/mL  BENZODIAZEPINES     200 ng/mL  COCAINE      300 ng/mL  METHADONE      300 ng/mL  OPIATES      300 ng/mL  PHENCYCLIDINE     25 ng/mL  THC       50 ng/mL  OXYCODONE      100 ng/mL    Comprehensive metabolic panel [[id number]]  (Abnormal) Collected: 07/22/23 1819    Lab Status: Final result Specimen: Blood from Arm, Left Updated: 07/22/23 1850     Sodium 139 mmol/L      Potassium 3.4 mmol/L      Chloride 108 mmol/L      CO2 19 mmol/L      ANION GAP 12 mmol/L      BUN 10 mg/dL      Creatinine 1.33 mg/dL      Glucose 106 mg/dL      Calcium 9.2 mg/dL      AST 17 U/L      ALT 14 U/L      Alkaline Phosphatase 76 U/L      Total Protein 7.1 g/dL      Albumin 4.2 g/dL      Total Bilirubin 0.54 mg/dL      eGFR 68 ml/min/1.73sq m     Narrative:      [place] guidelines for Chronic Kidney Disease (CKD):   •  Stage 1 with normal or high GFR (GFR > 90 mL/min/1.73 square meters)  •  Stage 2 Mild CKD (GFR = 60-89 mL/min/1.73 square meters)  •  Stage 3A Moderate CKD (GFR = 45-59 mL/min/1.73 square meters)  •  Stage 3B Moderate CKD (GFR = 30-44 mL/min/1.73 square meters)  •  Stage 4 Severe CKD (GFR = 15-29 mL/min/1.73 square meters)  •  Stage 5 End Stage CKD (GFR <15 mL/min/1.73 square meters)  Note: GFR calculation is accurate only with a steady state creatinine    Urine Microscopic [[id number]]  (Abnormal) Collected: 07/22/23 1819    Lab Status: Final result Specimen: Urine, Clean Catch Updated: 07/22/23 1841     RBC, UA 20-30 /hpf      WBC, UA 1-2 /hpf      Epithelial Cells Occasional /hpf      Bacteria, UA Occasional /hpf     UA w Reflex to Microscopic w Reflex to Culture [[id number]]  (Abnormal) Collected: 07/22/23 1819    Lab Status: Final result Specimen: Urine, Clean Catch Updated: 07/22/23 1831     Color, UA Yellow     Clarity, UA Clear     Specific Gravity, UA 1.020     pH, UA 8.0     Leukocytes, UA Negative     Nitrite, UA Negative     Protein, UA 30 (1+) mg/dl      Glucose, UA Negative mg/dl      Ketones, UA Negative mg/dl      Urobilinogen, UA 1.0 E.U./dl      Bilirubin, UA Negative     Occult Blood, UA Large    CBC and differential [[id number]] Collected: 07/22/23 1819    Lab Status: Final result Specimen: Blood from Arm, Left Updated: 07/22/23 1829     WBC 7.97 Thousand/uL      RBC 4.92 Million/uL      Hemoglobin 15.0 g/dL      Hematocrit 41.5 %      MCV 84 fL      MCH 30.5 pg      MCHC 36.1 g/dL      RDW 13.0 %      MPV 9.5 fL      Platelets 482 Thousands/uL      nRBC 0 /100 WBCs      Neutrophils Relative 55 %      Immat GRANS % 0 %      Lymphocytes Relative 35 %      Monocytes Relative 8 %      Eosinophils Relative 1 %      Basophils Relative 1 %      Neutrophils Absolute 4.36 Thousands/µL      Immature Grans Absolute 0.02 Thousand/uL      Lymphocytes Absolute 2.77 Thousands/µL      Monocytes Absolute 0.67 Thousand/µL      Eosinophils Absolute 0.11 Thousand/µL      Basophils Absolute 0.04 Thousands/µL                  CT renal stone study abdomen pelvis without contrast   Final Result by Nash Richard MD (07/22 2114)      4 mm obstructing calculus at the right distal ureter/ureterovesicular junction with moderate upstream hydroureteronephrosis. Mild bladder wall thickening although limited by underdistention. Correlate with urinalysis to exclude infectious etiology. Workstation performed: [id number]                    Procedures  Procedures         ED Course                                             Medical Decision Making  Labs, UA, CT ordered and reviewed. Patient is a 4 mm ureteral stone with hydroureteronephrosis. Pain is controlled in the ED at the time of discharge.   Patient will be discharged to home with a prescription for Vicodin, ibuprofen and Flomax. Outpatient follow-up with urology recommended. I reviewed patient's urine drug screen with patient-he denies drug use, and insists that the test results are inaccurate. Amount and/or Complexity of Data Reviewed  Labs: ordered. Radiology: ordered. Risk  Prescription drug management. Disposition  Final diagnoses:   Ureteral stone with hydronephrosis   Ureteral colic     Time reflects when diagnosis was documented in both MDM as applicable and the Disposition within this note     Time User Action Codes Description Comment    7/22/2023  9:22 PM Mack Figures Add [N20.1] Right ureteral stone     7/22/2023  9:22 PM Mack Figures Add [N13.2] Ureteral stone with hydronephrosis     7/22/2023  9:22 PM Mack Figures Modify [N13.2] Ureteral stone with hydronephrosis     7/22/2023  9:22 PM Mack Figures Remove [N20.1] Right ureteral stone     7/22/2023  9:22 PM Amck Figures Add [K93] Ureteral colic       ED Disposition     ED Disposition   Discharge    Condition   Stable    Date/Time   Sat Jul 22, 2023  9:22 PM    Comment   Susanna Katja discharge to home/self care.                Follow-up Information     Follow up With Specialties Details Why Contact Info    Mccoy Hamman, MD  Schedule an appointment as soon as possible for a visit in 2 days for follow up [address] [facility]  [phone]      Gala Mahoney MD Urology Schedule an appointment as soon as possible for a visit in 1 day for follow up [address]  [phone]            Discharge Medication List as of 7/22/2023  9:26 PM      START taking these medications    Details   HYDROcodone-acetaminophen (Norco) 5-325 mg per tablet Take 1 tablet by mouth every 6 (six) hours as needed for pain for up to 10 days Max Daily Amount: 4 tablets, Starting Sat 7/22/2023, Until Tue 8/1/2023 at 2359, Normal      ibuprofen (MOTRIN) 600 mg tablet Take 1 tablet (600 mg total) by mouth every 6 (six) hours as needed for mild pain or moderate pain, Starting Sat 7/22/2023, Normal      tamsulosin (FLOMAX) 0.4 mg Take 1 capsule (0.4 mg total) by mouth daily with dinner, Starting Sat 7/22/2023, Normal         CONTINUE these medications which have NOT CHANGED    Details   acetaminophen (TYLENOL) 325 mg tablet Take 3 tablets (975 mg total) by mouth every 8 (eight) hours, Starting Fri 6/16/2023, No Print      buPROPion (WELLBUTRIN XL) 300 mg 24 hr tablet Take 300 mg by mouth daily, Historical Med      busPIRone (BUSPAR) 10 mg tablet Take 10 mg by mouth 3 (three) times a day, Historical Med      gabapentin (NEURONTIN) 600 MG tablet Take 600 mg by mouth 3 (three) times a day, Historical Med      ondansetron (ZOFRAN) 4 mg/2 mL injection Inject 2 mL (4 mg total) into a catheter in a vein every 6 (six) hours as needed for nausea or vomiting, Starting Fri 6/16/2023, No Print      QUEtiapine (SEROquel) 50 mg tablet Take 1 tablet (50 mg total) by mouth daily at bedtime, Starting Fri 6/16/2023, Until Sun 7/16/2023, No Print      !! sertraline (ZOLOFT) 100 mg tablet Take 100 mg by mouth daily, Historical Med      !! sertraline (ZOLOFT) 100 mg tablet Take 100 mg by mouth daily, Historical Med       !! - Potential duplicate medications found. Please discuss with provider. No discharge procedures on file.     PDMP Review     None          ED Provider  Electronically Signed by           Pb Alcantar DO  07/23/23 0025

## 2023-07-23 NOTE — DISCHARGE INSTRUCTIONS
Return to the ER for further concerns or worsening symptoms  Follow up with your primary care physician and urology in 1-2 days  Take medication as prescribed

## 2023-07-25 ENCOUNTER — HOSPITAL ENCOUNTER (OUTPATIENT)
Facility: HOSPITAL | Age: 33
Setting detail: OUTPATIENT SURGERY
End: 2023-07-25
Attending: SPECIALIST | Admitting: SPECIALIST
Payer: COMMERCIAL

## 2023-07-26 ENCOUNTER — APPOINTMENT (EMERGENCY)
Dept: RADIOLOGY | Facility: HOSPITAL | Age: 33
End: 2023-07-26
Payer: COMMERCIAL

## 2023-07-26 ENCOUNTER — HOSPITAL ENCOUNTER (OUTPATIENT)
Facility: HOSPITAL | Age: 33
Setting detail: OUTPATIENT SURGERY
Discharge: HOME | End: 2023-07-28
Attending: EMERGENCY MEDICINE | Admitting: INTERNAL MEDICINE
Payer: COMMERCIAL

## 2023-07-26 DIAGNOSIS — R73.9 HYPERGLYCEMIA: ICD-10-CM

## 2023-07-26 DIAGNOSIS — N17.9 AKI (ACUTE KIDNEY INJURY) (HCC): ICD-10-CM

## 2023-07-26 DIAGNOSIS — N13.2 URETERAL STONE WITH HYDRONEPHROSIS: Primary | ICD-10-CM

## 2023-07-26 DIAGNOSIS — F32.A ANXIETY AND DEPRESSION: ICD-10-CM

## 2023-07-26 DIAGNOSIS — N10 ACUTE PYELONEPHRITIS: ICD-10-CM

## 2023-07-26 DIAGNOSIS — F41.9 ANXIETY AND DEPRESSION: ICD-10-CM

## 2023-07-26 DIAGNOSIS — N20.0 CALCULUS OF KIDNEY: ICD-10-CM

## 2023-07-26 PROBLEM — N39.0 UTI (URINARY TRACT INFECTION): Status: ACTIVE | Noted: 2023-07-26

## 2023-07-26 LAB
ANION GAP SERPL CALCULATED.3IONS-SCNC: 7 MMOL/L
APTT PPP: 36 SECONDS (ref 23–37)
BACTERIA UR QL AUTO: ABNORMAL /HPF
BASOPHILS # BLD AUTO: 0.02 THOUSANDS/ÂΜL (ref 0–0.1)
BASOPHILS NFR BLD AUTO: 0 % (ref 0–1)
BILIRUB UR QL STRIP: NEGATIVE
BUN SERPL-MCNC: 20 MG/DL (ref 5–25)
CALCIUM SERPL-MCNC: 8.8 MG/DL (ref 8.4–10.2)
CHLORIDE SERPL-SCNC: 107 MMOL/L (ref 96–108)
CLARITY UR: CLEAR
CO2 SERPL-SCNC: 24 MMOL/L (ref 21–32)
COLOR UR: YELLOW
CREAT SERPL-MCNC: 1.75 MG/DL (ref 0.6–1.3)
EOSINOPHIL # BLD AUTO: 0.12 THOUSAND/ÂΜL (ref 0–0.61)
EOSINOPHIL NFR BLD AUTO: 1 % (ref 0–6)
ERYTHROCYTE [DISTWIDTH] IN BLOOD BY AUTOMATED COUNT: 13.2 % (ref 11.6–15.1)
GFR SERPL CREATININE-BSD FRML MDRD: 49 ML/MIN/1.73SQ M
GLUCOSE SERPL-MCNC: 167 MG/DL (ref 65–140)
GLUCOSE UR STRIP-MCNC: NEGATIVE MG/DL
HCT VFR BLD AUTO: 39.5 % (ref 36.5–49.3)
HGB BLD-MCNC: 13.6 G/DL (ref 12–17)
HGB UR QL STRIP.AUTO: ABNORMAL
IMM GRANULOCYTES # BLD AUTO: 0.03 THOUSAND/UL (ref 0–0.2)
IMM GRANULOCYTES NFR BLD AUTO: 0 % (ref 0–2)
INR PPP: 1.17 (ref 0.84–1.19)
KETONES UR STRIP-MCNC: NEGATIVE MG/DL
LACTATE SERPL-SCNC: 1 MMOL/L (ref 0.5–2)
LEUKOCYTE ESTERASE UR QL STRIP: NEGATIVE
LYMPHOCYTES # BLD AUTO: 0.94 THOUSANDS/ÂΜL (ref 0.6–4.47)
LYMPHOCYTES NFR BLD AUTO: 9 % (ref 14–44)
MCH RBC QN AUTO: 30.4 PG (ref 26.8–34.3)
MCHC RBC AUTO-ENTMCNC: 34.4 G/DL (ref 31.4–37.4)
MCV RBC AUTO: 88 FL (ref 82–98)
MONOCYTES # BLD AUTO: 0.93 THOUSAND/ÂΜL (ref 0.17–1.22)
MONOCYTES NFR BLD AUTO: 9 % (ref 4–12)
NEUTROPHILS # BLD AUTO: 8.64 THOUSANDS/ÂΜL (ref 1.85–7.62)
NEUTS SEG NFR BLD AUTO: 81 % (ref 43–75)
NITRITE UR QL STRIP: NEGATIVE
NON-SQ EPI CELLS URNS QL MICRO: ABNORMAL /HPF
NRBC BLD AUTO-RTO: 0 /100 WBCS
PH UR STRIP.AUTO: 6 [PH]
PLATELET # BLD AUTO: 175 THOUSANDS/UL (ref 149–390)
PMV BLD AUTO: 9.9 FL (ref 8.9–12.7)
POTASSIUM SERPL-SCNC: 4.2 MMOL/L (ref 3.5–5.3)
PROT UR STRIP-MCNC: ABNORMAL MG/DL
PROTHROMBIN TIME: 15 SECONDS (ref 11.6–14.5)
RBC # BLD AUTO: 4.47 MILLION/UL (ref 3.88–5.62)
RBC #/AREA URNS AUTO: ABNORMAL /HPF
SODIUM SERPL-SCNC: 138 MMOL/L (ref 135–147)
SP GR UR STRIP.AUTO: 1.02 (ref 1–1.03)
UROBILINOGEN UR QL STRIP.AUTO: 1 E.U./DL
WBC # BLD AUTO: 10.68 THOUSAND/UL (ref 4.31–10.16)
WBC #/AREA URNS AUTO: ABNORMAL /HPF

## 2023-07-26 PROCEDURE — 87086 URINE CULTURE/COLONY COUNT: CPT | Performed by: INTERNAL MEDICINE

## 2023-07-26 PROCEDURE — 85610 PROTHROMBIN TIME: CPT | Performed by: PHYSICIAN ASSISTANT

## 2023-07-26 PROCEDURE — 85025 COMPLETE CBC W/AUTO DIFF WBC: CPT | Performed by: PHYSICIAN ASSISTANT

## 2023-07-26 PROCEDURE — 83605 ASSAY OF LACTIC ACID: CPT | Performed by: PHYSICIAN ASSISTANT

## 2023-07-26 PROCEDURE — 74018 RADEX ABDOMEN 1 VIEW: CPT

## 2023-07-26 PROCEDURE — 85730 THROMBOPLASTIN TIME PARTIAL: CPT | Performed by: PHYSICIAN ASSISTANT

## 2023-07-26 PROCEDURE — 36415 COLL VENOUS BLD VENIPUNCTURE: CPT | Performed by: PHYSICIAN ASSISTANT

## 2023-07-26 PROCEDURE — 99223 1ST HOSP IP/OBS HIGH 75: CPT | Performed by: INTERNAL MEDICINE

## 2023-07-26 PROCEDURE — 80048 BASIC METABOLIC PNL TOTAL CA: CPT | Performed by: PHYSICIAN ASSISTANT

## 2023-07-26 PROCEDURE — 87040 BLOOD CULTURE FOR BACTERIA: CPT | Performed by: PHYSICIAN ASSISTANT

## 2023-07-26 PROCEDURE — 81001 URINALYSIS AUTO W/SCOPE: CPT | Performed by: PHYSICIAN ASSISTANT

## 2023-07-26 RX ORDER — ONDANSETRON 2 MG/ML
4 INJECTION INTRAMUSCULAR; INTRAVENOUS EVERY 6 HOURS PRN
Status: DISCONTINUED | OUTPATIENT
Start: 2023-07-26 | End: 2023-07-28 | Stop reason: HOSPADM

## 2023-07-26 RX ORDER — SODIUM CHLORIDE 9 MG/ML
75 INJECTION, SOLUTION INTRAVENOUS CONTINUOUS
Status: DISCONTINUED | OUTPATIENT
Start: 2023-07-26 | End: 2023-07-28 | Stop reason: HOSPADM

## 2023-07-26 RX ORDER — ACETAMINOPHEN 325 MG/1
650 TABLET ORAL EVERY 6 HOURS PRN
Status: DISCONTINUED | OUTPATIENT
Start: 2023-07-26 | End: 2023-07-28 | Stop reason: HOSPADM

## 2023-07-26 RX ORDER — DISULFIRAM 250 MG/1
250 TABLET ORAL DAILY
Status: DISCONTINUED | OUTPATIENT
Start: 2023-07-27 | End: 2023-07-28 | Stop reason: HOSPADM

## 2023-07-26 RX ORDER — GABAPENTIN 300 MG/1
600 CAPSULE ORAL 3 TIMES DAILY
Status: DISCONTINUED | OUTPATIENT
Start: 2023-07-26 | End: 2023-07-28 | Stop reason: HOSPADM

## 2023-07-26 RX ORDER — ONDANSETRON 2 MG/ML
4 INJECTION INTRAMUSCULAR; INTRAVENOUS ONCE
Status: COMPLETED | OUTPATIENT
Start: 2023-07-26 | End: 2023-07-26

## 2023-07-26 RX ORDER — DISULFIRAM 250 MG/1
250 TABLET ORAL DAILY
COMMUNITY
Start: 2023-07-17 | End: 2023-09-15

## 2023-07-26 RX ORDER — QUETIAPINE FUMARATE 25 MG/1
50 TABLET, FILM COATED ORAL
Status: DISCONTINUED | OUTPATIENT
Start: 2023-07-26 | End: 2023-07-28 | Stop reason: HOSPADM

## 2023-07-26 RX ORDER — CEFTRIAXONE 1 G/50ML
1000 INJECTION, SOLUTION INTRAVENOUS EVERY 24 HOURS
Status: DISCONTINUED | OUTPATIENT
Start: 2023-07-27 | End: 2023-07-28 | Stop reason: HOSPADM

## 2023-07-26 RX ORDER — BUPROPION HYDROCHLORIDE 150 MG/1
150 TABLET ORAL DAILY
Status: DISCONTINUED | OUTPATIENT
Start: 2023-07-27 | End: 2023-07-28 | Stop reason: HOSPADM

## 2023-07-26 RX ORDER — MORPHINE SULFATE 4 MG/ML
4 INJECTION, SOLUTION INTRAMUSCULAR; INTRAVENOUS ONCE
Status: COMPLETED | OUTPATIENT
Start: 2023-07-26 | End: 2023-07-26

## 2023-07-26 RX ORDER — CEFTRIAXONE 1 G/50ML
1000 INJECTION, SOLUTION INTRAVENOUS ONCE
Status: COMPLETED | OUTPATIENT
Start: 2023-07-26 | End: 2023-07-26

## 2023-07-26 RX ORDER — MORPHINE SULFATE 4 MG/ML
4 INJECTION, SOLUTION INTRAMUSCULAR; INTRAVENOUS EVERY 4 HOURS PRN
Status: DISCONTINUED | OUTPATIENT
Start: 2023-07-26 | End: 2023-07-28

## 2023-07-26 RX ORDER — TAMSULOSIN HYDROCHLORIDE 0.4 MG/1
0.4 CAPSULE ORAL
Status: DISCONTINUED | OUTPATIENT
Start: 2023-07-26 | End: 2023-07-28 | Stop reason: HOSPADM

## 2023-07-26 RX ADMIN — MORPHINE SULFATE 4 MG: 4 INJECTION INTRAVENOUS at 16:43

## 2023-07-26 RX ADMIN — MORPHINE SULFATE 4 MG: 4 INJECTION INTRAVENOUS at 21:14

## 2023-07-26 RX ADMIN — CEFTRIAXONE 1000 MG: 1 INJECTION, SOLUTION INTRAVENOUS at 16:11

## 2023-07-26 RX ADMIN — GABAPENTIN 600 MG: 300 CAPSULE ORAL at 21:13

## 2023-07-26 RX ADMIN — QUETIAPINE FUMARATE 50 MG: 25 TABLET ORAL at 21:13

## 2023-07-26 RX ADMIN — TAMSULOSIN HYDROCHLORIDE 0.4 MG: 0.4 CAPSULE ORAL at 16:43

## 2023-07-26 RX ADMIN — SODIUM CHLORIDE 125 ML/HR: 0.9 INJECTION, SOLUTION INTRAVENOUS at 16:44

## 2023-07-26 RX ADMIN — GABAPENTIN 600 MG: 300 CAPSULE ORAL at 17:02

## 2023-07-26 RX ADMIN — SODIUM CHLORIDE 1000 ML: 0.9 INJECTION, SOLUTION INTRAVENOUS at 14:45

## 2023-07-26 RX ADMIN — MORPHINE SULFATE 4 MG: 4 INJECTION INTRAVENOUS at 14:50

## 2023-07-26 RX ADMIN — MORPHINE SULFATE 2 MG: 2 INJECTION, SOLUTION INTRAMUSCULAR; INTRAVENOUS at 19:10

## 2023-07-26 RX ADMIN — ONDANSETRON 4 MG: 2 INJECTION INTRAMUSCULAR; INTRAVENOUS at 14:45

## 2023-07-26 NOTE — PLAN OF CARE
Problem: PAIN - ADULT  Goal: Verbalizes/displays adequate comfort level or baseline comfort level  Description: Interventions:  - Encourage patient to monitor pain and request assistance  - Assess pain using appropriate pain scale  - Administer analgesics based on type and severity of pain and evaluate response  - Implement non-pharmacological measures as appropriate and evaluate response  - Consider cultural and social influences on pain and pain management  - Notify physician/advanced practitioner if interventions unsuccessful or patient reports new pain  Outcome: Progressing     Problem: GENITOURINARY - ADULT  Goal: Maintains or returns to baseline urinary function  Description: INTERVENTIONS:  - Assess urinary function  - Encourage oral fluids to ensure adequate hydration if ordered  - Administer IV fluids as ordered to ensure adequate hydration  - Administer ordered medications as needed  - Offer frequent toileting  - Follow urinary retention protocol if ordered  Outcome: Progressing  Goal: Absence of urinary retention  Description: INTERVENTIONS:  - Assess patient’s ability to void and empty bladder  - Monitor I/O  - Bladder scan as needed  - Discuss with physician/AP medications to alleviate retention as needed  - Discuss catheterization for long term situations as appropriate  Outcome: Progressing  Goal: Urinary catheter remains patent  Description: INTERVENTIONS:  - Assess patency of urinary catheter  - If patient has a chronic henderson, consider changing catheter if non-functioning  - Follow guidelines for intermittent irrigation of non-functioning urinary catheter  Outcome: Progressing

## 2023-07-26 NOTE — ED PROVIDER NOTES
History  Chief Complaint   Patient presents with   • Back Pain     Pain to R side, back and towards groin, here several days ago with stone     9year-old male presenting today with continued right-sided flank right lower quadrant and groin pain over the past 4 days since being diagnosed with a right ureteral stone at 4 mm with signs of obstruction. Presents with 100.5 temperature and a heart rate of 109. Took Toradol prior to arrival.  He is placed on the schedule tomorrow for stone removal with Dr. Chanda Pcikett. States that he has been tolerating nausea well. He took Toradol just prior to arrival.  Taking other medications with minimal relief. States that Toradol does not work well for him. Denies fevers, chest pain, shortness of breath, changes in urination. Differential includes but is not limited to UTI, pyelo-, septic stone, obstructed stone. Prior to Admission Medications   Prescriptions Last Dose Informant Patient Reported? Taking?    HYDROcodone-acetaminophen (Norco) 5-325 mg per tablet   No No   Sig: Take 1 tablet by mouth every 6 (six) hours as needed for pain for up to 10 days Max Daily Amount: 4 tablets   QUEtiapine (SEROquel) 50 mg tablet   No No   Sig: Take 1 tablet (50 mg total) by mouth daily at bedtime   acetaminophen (TYLENOL) 325 mg tablet   No No   Sig: Take 3 tablets (975 mg total) by mouth every 8 (eight) hours   buPROPion (WELLBUTRIN XL) 300 mg 24 hr tablet   Yes No   Sig: Take 300 mg by mouth daily   busPIRone (BUSPAR) 10 mg tablet   Yes No   Sig: Take 10 mg by mouth 3 (three) times a day   gabapentin (NEURONTIN) 600 MG tablet   Yes No   Sig: Take 600 mg by mouth 3 (three) times a day   ibuprofen (MOTRIN) 600 mg tablet   No No   Sig: Take 1 tablet (600 mg total) by mouth every 6 (six) hours as needed for mild pain or moderate pain   ondansetron (ZOFRAN) 4 mg/2 mL injection   No No   Sig: Inject 2 mL (4 mg total) into a catheter in a vein every 6 (six) hours as needed for nausea or vomiting   sertraline (ZOLOFT) 100 mg tablet   Yes No   Sig: Take 100 mg by mouth daily   sertraline (ZOLOFT) 100 mg tablet   Yes No   Sig: Take 100 mg by mouth daily   tamsulosin (FLOMAX) 0.4 mg   No No   Sig: Take 1 capsule (0.4 mg total) by mouth daily with dinner      Facility-Administered Medications: None       Past Medical History:   Diagnosis Date   • Depression    • ETOH abuse        History reviewed. No pertinent surgical history. History reviewed. No pertinent family history. I have reviewed and agree with the history as documented. E-Cigarette/Vaping   • E-Cigarette Use Never User      E-Cigarette/Vaping Substances   • Nicotine No    • THC No    • CBD No    • Flavoring No    • Other No    • Unknown No      Social History     Tobacco Use   • Smoking status: Never   • Smokeless tobacco: Never   Vaping Use   • Vaping Use: Never used   Substance Use Topics   • Alcohol use: Yes     Alcohol/week: 6.0 standard drinks of alcohol     Types: 2 Glasses of wine, 4 Cans of beer per week     Comment: 2 pints of vodka or more with binge drinking    • Drug use: Never       Review of Systems   Constitutional: Negative. Negative for chills, fever and unexpected weight change. Denies IV drug use     HENT: Negative. Eyes: Negative. Respiratory: Negative. Negative for cough, chest tightness, shortness of breath and wheezing. Cardiovascular: Negative. Negative for chest pain and palpitations. Gastrointestinal: Positive for abdominal pain. Negative for abdominal distention, anal bleeding, blood in stool, constipation, diarrhea, nausea and vomiting. Genitourinary: Negative. Negative for difficulty urinating, dysuria, flank pain, frequency, hematuria and urgency. Denies numbness, tingling in the groin. Musculoskeletal: Positive for back pain. Negative for arthralgias, gait problem, joint swelling, myalgias, neck pain and neck stiffness. Skin: Negative. Negative for color change. Neurological: Negative. Negative for dizziness, tremors, weakness, light-headedness, numbness and headaches. All other systems reviewed and are negative. Physical Exam  Physical Exam  Vitals and nursing note reviewed. Constitutional:       General: He is not in acute distress. Appearance: He is well-developed. He is not diaphoretic. HENT:      Head: Normocephalic and atraumatic. Right Ear: External ear normal.      Left Ear: External ear normal.      Nose: Nose normal.      Mouth/Throat:      Pharynx: No oropharyngeal exudate. Eyes:      General: No scleral icterus. Right eye: No discharge. Left eye: No discharge. Conjunctiva/sclera: Conjunctivae normal.      Pupils: Pupils are equal, round, and reactive to light. Cardiovascular:      Rate and Rhythm: Normal rate and regular rhythm. Heart sounds: Normal heart sounds. No murmur heard. No friction rub. No gallop. Pulmonary:      Effort: Pulmonary effort is normal. No respiratory distress. Breath sounds: Normal breath sounds. No wheezing or rales. Chest:      Chest wall: No tenderness. Abdominal:      General: Bowel sounds are normal. There is no distension. Palpations: Abdomen is soft. There is no mass. Tenderness: There is abdominal tenderness. There is no right CVA tenderness, left CVA tenderness, guarding or rebound. Hernia: No hernia is present. Musculoskeletal:        Arms:       Cervical back: Normal range of motion and neck supple. Lymphadenopathy:      Cervical: No cervical adenopathy. Skin:     General: Skin is warm and dry. Capillary Refill: Capillary refill takes less than 2 seconds. Coloration: Skin is not pale. Findings: No erythema or rash. Neurological:      Mental Status: He is alert and oriented to person, place, and time.    Psychiatric:         Mood and Affect: Mood normal.         Vital Signs  ED Triage Vitals [07/26/23 1404]   Temperature Pulse Respirations Blood Pressure SpO2   100.5 °F (38.1 °C) (!) 109 22 126/82 97 %      Temp Source Heart Rate Source Patient Position - Orthostatic VS BP Location FiO2 (%)   Tympanic Monitor Sitting Right arm --      Pain Score       8           Vitals:    07/26/23 1404 07/26/23 1500 07/26/23 1545   BP: 126/82 146/81 139/78   Pulse: (!) 109 104 96   Patient Position - Orthostatic VS: Sitting Lying          Visual Acuity      ED Medications  Medications   cefTRIAXone (ROCEPHIN) IVPB (premix in dextrose) 1,000 mg 50 mL (has no administration in time range)   sodium chloride 0.9 % bolus 1,000 mL (1,000 mL Intravenous New Bag 7/26/23 1445)   ondansetron (ZOFRAN) injection 4 mg (4 mg Intravenous Given 7/26/23 1445)   morphine injection 4 mg (4 mg Intravenous Given 7/26/23 1450)       Diagnostic Studies  Results Reviewed     Procedure Component Value Units Date/Time    Lactic acid, plasma (w/reflex if result > 2.0) [[id number]]  (Normal) Collected: 07/26/23 1428    Lab Status: Final result Specimen: Blood from Arm, Right Updated: 07/26/23 1534     LACTIC ACID 1.0 mmol/L     Narrative:      Result may be elevated if tourniquet was used during collection.     Urine culture [[id number]]     Lab Status: No result Specimen: Urine, Clean Catch     Urine Microscopic [[id number]]  (Abnormal) Collected: 07/26/23 1437    Lab Status: Final result Specimen: Urine, Clean Catch Updated: 07/26/23 1528     RBC, UA 2-4 /hpf      WBC, UA 4-10 /hpf      Epithelial Cells Occasional /hpf      Bacteria, UA Moderate /hpf     Basic metabolic panel [[id number]]  (Abnormal) Collected: 07/26/23 1428    Lab Status: Final result Specimen: Blood from Arm, Right Updated: 07/26/23 1523     Sodium 138 mmol/L      Potassium 4.2 mmol/L      Chloride 107 mmol/L      CO2 24 mmol/L      ANION GAP 7 mmol/L      BUN 20 mg/dL      Creatinine 1.75 mg/dL      Glucose 167 mg/dL      Calcium 8.8 mg/dL      eGFR 49 ml/min/1.73sq m     Narrative:      National Kid[employer] Disease Foundation guidelines for Chronic Kidney Disease (CKD):   •  Stage 1 with normal or high GFR (GFR > 90 mL/min/1.73 square meters)  •  Stage 2 Mild CKD (GFR = 60-89 mL/min/1.73 square meters)  •  Stage 3A Moderate CKD (GFR = 45-59 mL/min/1.73 square meters)  •  Stage 3B Moderate CKD (GFR = 30-44 mL/min/1.73 square meters)  •  Stage 4 Severe CKD (GFR = 15-29 mL/min/1.73 square meters)  •  Stage 5 End Stage CKD (GFR <15 mL/min/1.73 square meters)  Note: GFR calculation is accurate only with a steady state creatinine    UA w Reflex to Microscopic w Reflex to Culture [[id number]]  (Abnormal) Collected: 07/26/23 1437    Lab Status: Final result Specimen: Urine, Clean Catch Updated: 07/26/23 1522     Color, UA Yellow     Clarity, UA Clear     Specific Gravity, UA 1.025     pH, UA 6.0     Leukocytes, UA Negative     Nitrite, UA Negative     Protein, UA Trace mg/dl      Glucose, UA Negative mg/dl      Ketones, UA Negative mg/dl      Urobilinogen, UA 1.0 E.U./dl      Bilirubin, UA Negative     Occult Blood, UA Moderate    APTT [[id number]]  (Normal) Collected: 07/26/23 1428    Lab Status: Final result Specimen: Blood from Arm, Right Updated: 07/26/23 1516     PTT 36 seconds     Protime-INR [[id number]]  (Abnormal) Collected: 07/26/23 1428    Lab Status: Final result Specimen: Blood from Arm, Right Updated: 07/26/23 1516     Protime 15.0 seconds      INR 1.17    CBC and differential [[id number]]  (Abnormal) Collected: 07/26/23 1428    Lab Status: Final result Specimen: Blood from Arm, Right Updated: 07/26/23 1459     WBC 10.68 Thousand/uL      RBC 4.47 Million/uL      Hemoglobin 13.6 g/dL      Hematocrit 39.5 %      MCV 88 fL      MCH 30.4 pg      MCHC 34.4 g/dL      RDW 13.2 %      MPV 9.9 fL      Platelets 557 Thousands/uL      nRBC 0 /100 WBCs      Neutrophils Relative 81 %      Immat GRANS % 0 %      Lymphocytes Relative 9 %      Monocytes Relative 9 %      Eosinophils Relative 1 %      Basophils Relative 0 % Neutrophils Absolute 8.64 Thousands/µL      Immature Grans Absolute 0.03 Thousand/uL      Lymphocytes Absolute 0.94 Thousands/µL      Monocytes Absolute 0.93 Thousand/µL      Eosinophils Absolute 0.12 Thousand/µL      Basophils Absolute 0.02 Thousands/µL     Blood culture #1 [[id number]] Collected: 07/26/23 1435    Lab Status: In process Specimen: Blood from Arm, Right Updated: 07/26/23 1456    Blood culture #2 [[id number]] Collected: 07/26/23 1442    Lab Status: In process Specimen: Blood from Arm, Left Updated: 07/26/23 1456                 XR abdomen 1 view kub    (Results Pending)              Procedures  Procedures         ED Course  ED Course as of 07/26/23 1552   Wed Jul 26, 2023   [facility] [place] [address], reaching out to Dr. Mynor Luna and consult placed    [id number] lab to add on urine culture                                              Medical Decision Making  Patient has low grade temp. Sepsis labs ordered. bill showing creatinine of 1.75 from 1.33 from 4 days ago. Will hydrate and start on antibiotics given temp. Dr. Mynor Luna made aware and consult placed. Patient verbalized understanding and agrees with the above assessment and plan. BILL (acute kidney injury) ([address]): acute illness or injury  Hyperglycemia: acute illness or injury  Ureteral stone with hydronephrosis: acute illness or injury  Amount and/or Complexity of Data Reviewed  Labs: ordered. Radiology: ordered. Risk  Prescription drug management. Decision regarding hospitalization.           Disposition  Final diagnoses:   Ureteral stone with hydronephrosis   BILL (acute kidney injury) ([address])   Hyperglycemia     Time reflects when diagnosis was documented in both MDM as applicable and the Disposition within this note     Time User Action Codes Description Comment    7/26/2023  2:29 PM Terra Sifuentes Add [N13.2] Ureteral stone with hydronephrosis     7/26/2023  3:36 PM Terra Sifuentes Add [N17.9] BILL (acute kidney injury) ([address])     7/26/2023 3:36 PM Israel Marvin Add [R73.9] Hyperglycemia       ED Disposition     ED Disposition   Admit    Condition   Stable    Date/Time   Wed Jul 26, 2023  2:43 PM    Comment   Case was discussed with Dr. Gonzalo Navarro and the patient's admission status was agreed to be Admission Status: observation status to the service of Dr. Gonzalo Navarro . Follow-up Information    None         Patient's Medications   Discharge Prescriptions    No medications on file       No discharge procedures on file.     PDMP Review     None          ED Provider  Electronically Signed by           Israel Marvin PA-C  07/26/23 4431

## 2023-07-26 NOTE — H&P
History and Physical - [facility] Internal Medicine    Patient Information: Kristina Richards 26 y.o. male MRN: [id number]  Unit/Bed#: ED 01 Encounter: [id number]  Admitting Physician: Ricardo Ospina MD  PCP: Ladan Martinez MD  Date of Admission:  07/26/23        Hospital Problem List:     Principal Problem:    Nephrolithiasis with hydronephrosis  Active Problems:    Acute kidney injury [facility])    UTI (urinary tract infection)    Anxiety and depression    EtOH dependence ([address])      Assessment/Plan:    * Nephrolithiasis with hydronephrosis  Assessment & Plan  Presented with persistent pain, right flank radiating to groin since 7/22  CT abdomen 7/22-4 mm obstructing nephrolithiasis at right distal ureteral/UVJ junction with moderate hydronephrosis. No prior history of nephrolithiasis. No improvement so far with medical expulsion therapy  Noted to have low-grade fever, concerning for UTI and increasing creatinine  Hemodynamically stable  · Continue IV fluid  · Monitor intake output  · Tamsulosin  · Strain urine  · Pain control  · Symptomatic treatment  · Urology evaluation    UTI (urinary tract infection)  Assessment & Plan  Complicated in setting of restrictive uropathy  Today low-grade fever and leukocytosis  UA with evidence of pyuria and bacteriuria.   Patient reports dysuria and right flank pain  · Follow-up urine culture  · IV ceftriaxone  · Urology evaluation      Acute kidney injury [facility])  Assessment & Plan  Creatinine noted to be 1.75 on presentation compared to 1.33 on 7/22  Baseline creatinine appears to be 0.9-1.2  UA with moderate blood, trace protein, 4-10 WBC moderate bacteria  CT abdomen 7/22-4 mm obstructing nephrolithiasis at right distal ureteral/UVJ junction with moderate hydronephrosis  Likely secondary to obstructive uropathy, patient also reports NSAID use for pain  · IV  cc/h  · Monitor intake output  · Avoid NSAIDs, nephrotoxins  · Monitor BMP and electrolytes  · Follow-up urology input regarding surgical intervention    EtOH dependence ([address])  Assessment & Plan  Currently sober, on Antabuse    Anxiety and depression  Assessment & Plan  Reports that he is currently doing well on current regimen  Continue with Wellbutrin, sertraline and Seroquel nightly          VTE Prophylaxis: Activity as tolerated  Code Status: Level 1 - Full Code    Anticipated Length of Stay:  Patient will be admitted on an Observation basis with an anticipated length of stay of  *< 2 midnights. Justification for Hospital Stay: Nephrolithiasis with hydronephrosis, acute renal injury    Total Time for Visit, including Counseling / Coordination of Care: 45 minutes. Greater than 50% of this total time spent on direct patient counseling and coordination of care. Chief Complaint:     Back Pain (Pain to R side, back and towards groin, here several days ago with stone)    History of Present Illness:    Deepak Bailey is a 26 y.o. male with history of depression/anxiety, alcohol abuse who presents with right-sided flank pain ongoing for last 4 days. Patient was initially seen in ED on 7/22 with similar pain and Was noted to have a 4 mm right distal ureteral/ureterovesical junction stone with associated hydroureteronephrosis. UA revealed large blood without any evidence of infection. Patient received pain medication and Flomax and was subsequently discharged home on pain medication and Flomax. Patient was seen by urology on 7/25 and subsequently scheduled for urological intervention. Today patient reported that he had ongoing pain without significant improvement with prescribed pain medications and was referred to ED for further evaluation. Patient reported associated nonbloody nonbilious nausea. In ED patient was noted to have a temperature of 100.5 °F with associated tachycardia. Work-up revealed leukocytosis and creatinine was noted to be uptrending from recent ER visit.   Cultures were obtained, patient received IV Rocephin, IV morphine, IV Zofran, IV fluid and was subsequently admitted. Review of Systems:    Review of Systems   Constitutional: Positive for fever. Negative for activity change and appetite change. HENT: Positive for congestion. Negative for sore throat. Respiratory: Negative for cough and shortness of breath. Cardiovascular: Negative for chest pain and leg swelling. Gastrointestinal: Positive for nausea. Negative for abdominal pain and constipation. Genitourinary: Positive for dysuria and flank pain. Negative for hematuria. Neurological: Negative for dizziness and headaches. Psychiatric/Behavioral: Negative for confusion. Past Medical and Surgical History:     Past Medical History:   Diagnosis Date   • Depression    • ETOH abuse        History reviewed. No pertinent surgical history. Meds/Allergies:    PTA meds:   Prior to Admission Medications   Prescriptions Last Dose Informant Patient Reported? Taking?    HYDROcodone-acetaminophen (Norco) 5-325 mg per tablet   No No   Sig: Take 1 tablet by mouth every 6 (six) hours as needed for pain for up to 10 days Max Daily Amount: 4 tablets   QUEtiapine (SEROquel) 50 mg tablet   No No   Sig: Take 1 tablet (50 mg total) by mouth daily at bedtime   acetaminophen (TYLENOL) 325 mg tablet   No No   Sig: Take 3 tablets (975 mg total) by mouth every 8 (eight) hours   buPROPion (WELLBUTRIN XL) 300 mg 24 hr tablet   Yes No   Sig: Take 300 mg by mouth daily   busPIRone (BUSPAR) 10 mg tablet   Yes No   Sig: Take 10 mg by mouth 3 (three) times a day   gabapentin (NEURONTIN) 600 MG tablet   Yes No   Sig: Take 600 mg by mouth 3 (three) times a day   ibuprofen (MOTRIN) 600 mg tablet   No No   Sig: Take 1 tablet (600 mg total) by mouth every 6 (six) hours as needed for mild pain or moderate pain   ondansetron (ZOFRAN) 4 mg/2 mL injection   No No   Sig: Inject 2 mL (4 mg total) into a catheter in a vein every 6 (six) hours as needed for nausea or vomiting sertraline (ZOLOFT) 100 mg tablet   Yes No   Sig: Take 100 mg by mouth daily   sertraline (ZOLOFT) 100 mg tablet   Yes No   Sig: Take 100 mg by mouth daily   tamsulosin (FLOMAX) 0.4 mg   No No   Sig: Take 1 capsule (0.4 mg total) by mouth daily with dinner      Facility-Administered Medications: None       Allergies: Allergies   Allergen Reactions   • Iv Dye [Iodinated Contrast Media] Anaphylaxis   • Tetanus-Diphth-Acell Pertussis Anaphylaxis     History:     Marital Status: Single     Substance Use History:   Social History     Substance and Sexual Activity   Alcohol Use Yes   • Alcohol/week: 6.0 standard drinks of alcohol   • Types: 2 Glasses of wine, 4 Cans of beer per week    Comment: 2 pints of vodka or more with binge drinking      Social History     Tobacco Use   Smoking Status Never   Smokeless Tobacco Never     Social History     Substance and Sexual Activity   Drug Use Never       Family History:    History reviewed. No pertinent family history. Physical Exam:     Vitals:   Blood Pressure: 146/81 (07/26/23 1500)  Pulse: 104 (07/26/23 1500)  Temperature: 100.5 °F (38.1 °C) (07/26/23 1404)  Temp Source: Tympanic (07/26/23 1404)  Respirations: 20 (07/26/23 1500)  SpO2: 94 % (07/26/23 1500)    Physical Exam  Constitutional:       General: He is not in acute distress. HENT:      Head: Normocephalic and atraumatic. Mouth/Throat:      Mouth: Mucous membranes are moist.   Eyes:      Pupils: Pupils are equal, round, and reactive to light. Cardiovascular:      Rate and Rhythm: Normal rate. Pulmonary:      Effort: Pulmonary effort is normal. No respiratory distress. Breath sounds: No wheezing, rhonchi or rales. Chest:      Chest wall: No tenderness. Abdominal:      General: Bowel sounds are normal. There is no distension. Palpations: Abdomen is soft. Tenderness: There is no abdominal tenderness. There is right CVA tenderness. There is no guarding or rebound.    Musculoskeletal: Cervical back: Neck supple. Right lower leg: No edema. Left lower leg: No edema. Skin:     General: Skin is warm and dry. Findings: No rash. Neurological:      General: No focal deficit present. Mental Status: He is alert and oriented to person, place, and time. Mental status is at baseline. Cranial Nerves: No cranial nerve deficit. Psychiatric:         Mood and Affect: Mood normal.         Cognition and Memory: Cognition normal.                 Lab Results: I have personally reviewed pertinent reports. Results from last 7 days   Lab Units 07/26/23  1428   WBC Thousand/uL 10.68*   HEMOGLOBIN g/dL 13.6   HEMATOCRIT % 39.5   PLATELETS Thousands/uL 175   NEUTROS PCT % 81*   LYMPHS PCT % 9*   MONOS PCT % 9   EOS PCT % 1     Results from last 7 days   Lab Units 07/26/23  1428 07/22/23  1819   POTASSIUM mmol/L 4.2 3.4*   CHLORIDE mmol/L 107 108   CO2 mmol/L 24 19*   BUN mg/dL 20 10   CREATININE mg/dL 1.75* 1.33*   CALCIUM mg/dL 8.8 9.2   ALK PHOS U/L  --  76   ALT U/L  --  14   AST U/L  --  17     Results from last 7 days   Lab Units 07/26/23  1428   INR  1.17       Imaging: I have personally reviewed pertinent reports. CT renal stone study abdomen pelvis without contrast    Result Date: 7/22/2023  Narrative: CT ABDOMEN AND PELVIS WITHOUT IV CONTRAST - LOW DOSE RENAL STONE INDICATION:   right groin pain. COMPARISON: 6/13/2023. TECHNIQUE:  Low radiation dose thin section CT examination of the abdomen and pelvis was performed without intravenous or oral contrast according to a protocol specifically designed to evaluate for urinary tract calculus. Axial, sagittal, and coronal 2D  reformatted images were created from the source data and submitted for interpretation. Evaluation for pathology in the abdomen and pelvis that is unrelated to urinary tract calculi is limited. Radiation dose length product (DLP) for this visit:  313.77 mGy-cm .   This examination, like all CT scans performed in the [facility], was performed utilizing techniques to minimize radiation dose exposure, including the use of iterative  reconstruction and automated exposure control. URINARY TRACT FINDINGS: RIGHT KIDNEY AND URETER: 4 mm obstructing calculus at the right distal ureter/ureterovesicular junction with moderate upstream hydroureteronephrosis. LEFT KIDNEY AND URETER:  No urinary tract calculi. No hydronephrosis or hydroureter. URINARY BLADDER: Mild circumferential wall thickening although limited by underdistention. ADDITIONAL FINDINGS: LOWER CHEST:  No clinically significant abnormality identified in the visualized lower chest. SOLID VISCERA: Limited low radiation dose noncontrast CT evaluation demonstrates no clinically significant abnormality of the imaged portions of the liver, spleen, pancreas, or adrenal glands. GALLBLADDER/BILIARY TREE:  No calcified gallstones. No pericholecystic inflammatory change. No biliary dilatation. STOMACH AND BOWEL:  Unremarkable. APPENDIX:  A normal appendix was visualized. ABDOMINOPELVIC CAVITY:  No ascites. No pneumoperitoneum. No lymphadenopathy. VESSELS: Unremarkable for patient's age. REPRODUCTIVE ORGANS:  Unremarkable for patient's age. ABDOMINAL WALL/INGUINAL REGIONS:  Unremarkable. OSSEOUS STRUCTURES:  No acute fracture or destructive osseous lesion. Impression: 4 mm obstructing calculus at the right distal ureter/ureterovesicular junction with moderate upstream hydroureteronephrosis. Mild bladder wall thickening although limited by underdistention. Correlate with urinalysis to exclude infectious etiology. Workstation performed: [id number]       XR abdomen 1 view kub    (Results Pending)       EKG, Pathology, and Other Studies Reviewed on Admission:   ·   · None    Allscripts/EPIC Records Reviewed: Yes     ** Please Note: "This note has been constructed using a voice recognition system. Therefore there may be syntax, spelling, and/or grammatical errors. Please call if you have any questions. "**

## 2023-07-26 NOTE — CONSULTS
H&P Exam - Urology       Patient: Adolfo Foote   : 1990 Sex: male   MRN: [id number]     CSN: [id number]      History of Present Illness   HPI:  Adolfo Foote is a 26 y.o. male who presents with unrelenting right flank pain nausea vomiting seen in my office yesterday after being discharged from [address] [place] [address] on  with right 4 mm distal ureterovesical junction stone seen in my office yesterday calling stating that he has severe pain sent into the ER admitted with right distal ureteral colic seen at the bedside at this time will start on IV fluids analgesics if pain continues unable to pass stone will be taken to the OR tomorrow        Review of Systems:   Constitutional:  Negative for activity change, fever, chills and diaphoresis. HENT: Negative for hearing loss and trouble swallowing. Eyes: Negative for itching and visual disturbance. Respiratory: Negative for chest tightness and shortness of breath. Cardiovascular: Negative for chest pain, edema. Gastrointestinal: Negative for abdominal distention, na abdominal pain, constipation, diarrhea, Nausea and vomiting. Genitourinary: Negative for decreased urine volume, difficulty urinating, dysuria, enuresis, frequency, hematuria and urgency. Musculoskeletal: Negative for gait problem and myalgias. Neurological: Negative for dizziness and headaches. Hematological: Does not bruise/bleed easily. Historical Information   Past Medical History:   Diagnosis Date   • Depression    • ETOH abuse      History reviewed. No pertinent surgical history.   Social History   Social History     Substance and Sexual Activity   Alcohol Use Yes   • Alcohol/week: 6.0 standard drinks of alcohol   • Types: 2 Glasses of wine, 4 Cans of beer per week    Comment: 2 pints of vodka or more with binge drinking      Social History     Substance and Sexual Activity   Drug Use Never     Social History     Tobacco Use   Smoking Status Never Smokeless Tobacco Never     Family History: History reviewed. No pertinent family history. Meds/Allergies   Medications Prior to Admission   Medication   • acetaminophen (TYLENOL) 325 mg tablet   • buPROPion (WELLBUTRIN XL) 300 mg 24 hr tablet   • disulfiram (ANTABUSE) 250 mg tablet   • gabapentin (NEURONTIN) 600 MG tablet   • HYDROcodone-acetaminophen (Norco) 5-325 mg per tablet   • ibuprofen (MOTRIN) 600 mg tablet   • sertraline (ZOLOFT) 100 mg tablet   • tamsulosin (FLOMAX) 0.4 mg   • ondansetron (ZOFRAN) 4 mg/2 mL injection   • QUEtiapine (SEROquel) 50 mg tablet   • sertraline (ZOLOFT) 100 mg tablet     Allergies   Allergen Reactions   • Iv Dye [Iodinated Contrast Media] Anaphylaxis   • Tetanus-Diphth-Acell Pertussis Anaphylaxis       Objective   Vitals: /81   Pulse 98   Temp 98.8 °F (37.1 °C) (Oral)   Resp 18   Ht 6' (1.829 m)   Wt 90.9 kg (200 lb 8 oz)   SpO2 97%   BMI 27.19 kg/m²     Physical Exam:  General Alert awake   Normocephalic atraumatic PERRLA  Lungs clear bilaterally  Cardiac normal S1 normal S2  Abdomen soft, flank pain  Extremities no edema    No intake/output data recorded.     Invasive Devices     Peripheral Intravenous Line  Duration           Peripheral IV 07/26/23 Right Antecubital <1 day                    Lab Results: CBC:   Lab Results   Component Value Date    WBC 10.68 (H) 07/26/2023    HGB 13.6 07/26/2023    HCT 39.5 07/26/2023    MCV 88 07/26/2023     07/26/2023    RBC 4.47 07/26/2023    MCH 30.4 07/26/2023    MCHC 34.4 07/26/2023    RDW 13.2 07/26/2023    MPV 9.9 07/26/2023    NRBC 0 07/26/2023     CMP:   Lab Results   Component Value Date     10/15/2015     07/26/2023     10/15/2015    CO2 24 07/26/2023    CO2 26 06/13/2023    ANIONGAP 12.3 10/15/2015    BUN 20 07/26/2023    BUN 10 10/15/2015    CREATININE 1.75 (H) 07/26/2023    CREATININE 0.9 10/15/2015    GLUCOSE 88 06/13/2023    GLUCOSE 91 10/15/2015    CALCIUM 8.8 07/26/2023    CALCIUM 8.6 10/15/2015    AST 17 07/22/2023    AST 31 10/13/2015    ALT 14 07/22/2023    ALT 25 10/13/2015    ALKPHOS 76 07/22/2023    ALKPHOS 94 10/13/2015    PROT 7.5 10/13/2015    BILITOT 0.9 10/13/2015    EGFR 49 07/26/2023     Urinalysis:   Lab Results   Component Value Date    COLORU Yellow 07/26/2023    CLARITYU Clear 07/26/2023    CLARITYU JIGAR (A) 10/13/2015    CLARITYU CLEAR 10/13/2015    SPECGRAV 1.025 07/26/2023    SPECGRAV 1.015 10/13/2015    PHUR 6.0 07/26/2023    PHUR 8.0 10/13/2015    LEUKOCYTESUR Negative 07/26/2023    LEUKOCYTESUR NEGATIVE 10/13/2015    NITRITE Negative 07/26/2023    NITRITE NEGATIVE 10/13/2015    PROTEINUA TRACE 10/13/2015    GLUCOSEU Negative 07/26/2023    GLUCOSEU NEGATIVE 10/13/2015    KETONESU Negative 07/26/2023    KETONESU NEGATIVE 10/13/2015    BILIRUBINUR Negative 07/26/2023    BILIRUBINUR NEGATIVE 10/13/2015    BLOODU Moderate (A) 07/26/2023    BLOODU NEGATIVE 10/13/2015     Urine Culture: No results found for: "URINECX"  PSA: No results found for: "PSA"        Assessment/ Plan:  4 mm right distal ureterovesical junction stone has 95% chance of passing with medical expulsion therapy  Start tamsulosin 0.4 mg  Strain urine  N.p.o. after midnight if patient does not pass stone in light of ongoing pain will take to the OR tomorrow for cystoscopy right ureteroscopy basket extraction stent placement    Law Jolly MD

## 2023-07-27 ENCOUNTER — ANESTHESIA (OUTPATIENT)
Dept: PERIOP | Facility: HOSPITAL | Age: 33
End: 2023-07-27
Payer: COMMERCIAL

## 2023-07-27 ENCOUNTER — ANESTHESIA EVENT (OUTPATIENT)
Dept: PERIOP | Facility: HOSPITAL | Age: 33
End: 2023-07-27
Payer: COMMERCIAL

## 2023-07-27 ENCOUNTER — APPOINTMENT (OUTPATIENT)
Dept: RADIOLOGY | Facility: HOSPITAL | Age: 33
End: 2023-07-27
Payer: COMMERCIAL

## 2023-07-27 LAB
ANION GAP SERPL CALCULATED.3IONS-SCNC: 8 MMOL/L
BASOPHILS # BLD AUTO: 0.03 THOUSANDS/ÂΜL (ref 0–0.1)
BASOPHILS NFR BLD AUTO: 0 % (ref 0–1)
BUN SERPL-MCNC: 15 MG/DL (ref 5–25)
CALCIUM SERPL-MCNC: 8.4 MG/DL (ref 8.4–10.2)
CHLORIDE SERPL-SCNC: 107 MMOL/L (ref 96–108)
CO2 SERPL-SCNC: 23 MMOL/L (ref 21–32)
CREAT SERPL-MCNC: 1.66 MG/DL (ref 0.6–1.3)
EOSINOPHIL # BLD AUTO: 0.18 THOUSAND/ÂΜL (ref 0–0.61)
EOSINOPHIL NFR BLD AUTO: 2 % (ref 0–6)
ERYTHROCYTE [DISTWIDTH] IN BLOOD BY AUTOMATED COUNT: 13.2 % (ref 11.6–15.1)
GFR SERPL CREATININE-BSD FRML MDRD: 52 ML/MIN/1.73SQ M
GLUCOSE SERPL-MCNC: 96 MG/DL (ref 65–140)
HCT VFR BLD AUTO: 37.3 % (ref 36.5–49.3)
HGB BLD-MCNC: 12.4 G/DL (ref 12–17)
IMM GRANULOCYTES # BLD AUTO: 0.04 THOUSAND/UL (ref 0–0.2)
IMM GRANULOCYTES NFR BLD AUTO: 0 % (ref 0–2)
LYMPHOCYTES # BLD AUTO: 1.51 THOUSANDS/ÂΜL (ref 0.6–4.47)
LYMPHOCYTES NFR BLD AUTO: 14 % (ref 14–44)
MCH RBC QN AUTO: 29.5 PG (ref 26.8–34.3)
MCHC RBC AUTO-ENTMCNC: 33.2 G/DL (ref 31.4–37.4)
MCV RBC AUTO: 89 FL (ref 82–98)
MONOCYTES # BLD AUTO: 1.24 THOUSAND/ÂΜL (ref 0.17–1.22)
MONOCYTES NFR BLD AUTO: 11 % (ref 4–12)
NEUTROPHILS # BLD AUTO: 8.03 THOUSANDS/ÂΜL (ref 1.85–7.62)
NEUTS SEG NFR BLD AUTO: 73 % (ref 43–75)
NRBC BLD AUTO-RTO: 0 /100 WBCS
PLATELET # BLD AUTO: 183 THOUSANDS/UL (ref 149–390)
PMV BLD AUTO: 10 FL (ref 8.9–12.7)
POTASSIUM SERPL-SCNC: 4.4 MMOL/L (ref 3.5–5.3)
RBC # BLD AUTO: 4.2 MILLION/UL (ref 3.88–5.62)
SODIUM SERPL-SCNC: 138 MMOL/L (ref 135–147)
WBC # BLD AUTO: 11.03 THOUSAND/UL (ref 4.31–10.16)

## 2023-07-27 PROCEDURE — 99232 SBSQ HOSP IP/OBS MODERATE 35: CPT | Performed by: INTERNAL MEDICINE

## 2023-07-27 PROCEDURE — 87086 URINE CULTURE/COLONY COUNT: CPT | Performed by: SPECIALIST

## 2023-07-27 PROCEDURE — C2617 STENT, NON-COR, TEM W/O DEL: HCPCS | Performed by: SPECIALIST

## 2023-07-27 PROCEDURE — C1769 GUIDE WIRE: HCPCS | Performed by: SPECIALIST

## 2023-07-27 PROCEDURE — 85025 COMPLETE CBC W/AUTO DIFF WBC: CPT | Performed by: INTERNAL MEDICINE

## 2023-07-27 PROCEDURE — C1758 CATHETER, URETERAL: HCPCS | Performed by: SPECIALIST

## 2023-07-27 PROCEDURE — 80048 BASIC METABOLIC PNL TOTAL CA: CPT | Performed by: INTERNAL MEDICINE

## 2023-07-27 PROCEDURE — 74018 RADEX ABDOMEN 1 VIEW: CPT

## 2023-07-27 DEVICE — INLAY URETERAL STENT W/O GUIDEWIRE
Type: IMPLANTABLE DEVICE | Site: URETER | Status: FUNCTIONAL
Brand: BARD® INLAY® URETERAL STENT

## 2023-07-27 RX ORDER — ONDANSETRON 2 MG/ML
INJECTION INTRAMUSCULAR; INTRAVENOUS AS NEEDED
Status: DISCONTINUED | OUTPATIENT
Start: 2023-07-27 | End: 2023-07-27

## 2023-07-27 RX ORDER — FENTANYL CITRATE 50 UG/ML
INJECTION, SOLUTION INTRAMUSCULAR; INTRAVENOUS AS NEEDED
Status: DISCONTINUED | OUTPATIENT
Start: 2023-07-27 | End: 2023-07-27

## 2023-07-27 RX ORDER — MAGNESIUM HYDROXIDE 1200 MG/15ML
LIQUID ORAL AS NEEDED
Status: DISCONTINUED | OUTPATIENT
Start: 2023-07-27 | End: 2023-07-27 | Stop reason: HOSPADM

## 2023-07-27 RX ORDER — ONDANSETRON 2 MG/ML
4 INJECTION INTRAMUSCULAR; INTRAVENOUS ONCE AS NEEDED
Status: DISCONTINUED | OUTPATIENT
Start: 2023-07-27 | End: 2023-07-27 | Stop reason: HOSPADM

## 2023-07-27 RX ORDER — FENTANYL CITRATE/PF 50 MCG/ML
50 SYRINGE (ML) INJECTION
Status: DISCONTINUED | OUTPATIENT
Start: 2023-07-27 | End: 2023-07-27 | Stop reason: HOSPADM

## 2023-07-27 RX ORDER — PROPOFOL 10 MG/ML
INJECTION, EMULSION INTRAVENOUS AS NEEDED
Status: DISCONTINUED | OUTPATIENT
Start: 2023-07-27 | End: 2023-07-27

## 2023-07-27 RX ORDER — POLYETHYLENE GLYCOL 3350 17 G/17G
17 POWDER, FOR SOLUTION ORAL DAILY PRN
Status: DISCONTINUED | OUTPATIENT
Start: 2023-07-27 | End: 2023-07-28 | Stop reason: HOSPADM

## 2023-07-27 RX ORDER — LIDOCAINE HYDROCHLORIDE 10 MG/ML
INJECTION, SOLUTION EPIDURAL; INFILTRATION; INTRACAUDAL; PERINEURAL AS NEEDED
Status: DISCONTINUED | OUTPATIENT
Start: 2023-07-27 | End: 2023-07-27

## 2023-07-27 RX ORDER — DOCUSATE SODIUM 100 MG/1
100 CAPSULE, LIQUID FILLED ORAL 2 TIMES DAILY
Status: DISCONTINUED | OUTPATIENT
Start: 2023-07-27 | End: 2023-07-28 | Stop reason: HOSPADM

## 2023-07-27 RX ORDER — DEXAMETHASONE SODIUM PHOSPHATE 10 MG/ML
INJECTION, SOLUTION INTRAMUSCULAR; INTRAVENOUS AS NEEDED
Status: DISCONTINUED | OUTPATIENT
Start: 2023-07-27 | End: 2023-07-27

## 2023-07-27 RX ORDER — SODIUM CHLORIDE, SODIUM LACTATE, POTASSIUM CHLORIDE, CALCIUM CHLORIDE 600; 310; 30; 20 MG/100ML; MG/100ML; MG/100ML; MG/100ML
20 INJECTION, SOLUTION INTRAVENOUS CONTINUOUS
Status: CANCELLED | OUTPATIENT
Start: 2023-07-27

## 2023-07-27 RX ORDER — LEVOFLOXACIN 5 MG/ML
500 INJECTION, SOLUTION INTRAVENOUS EVERY 24 HOURS
Status: COMPLETED | OUTPATIENT
Start: 2023-07-27 | End: 2023-07-27

## 2023-07-27 RX ORDER — DIPHENHYDRAMINE HYDROCHLORIDE 50 MG/ML
12.5 INJECTION INTRAMUSCULAR; INTRAVENOUS ONCE AS NEEDED
Status: DISCONTINUED | OUTPATIENT
Start: 2023-07-27 | End: 2023-07-27 | Stop reason: HOSPADM

## 2023-07-27 RX ORDER — NALOXONE HYDROCHLORIDE 0.4 MG/ML
INJECTION, SOLUTION INTRAMUSCULAR; INTRAVENOUS; SUBCUTANEOUS AS NEEDED
Status: DISCONTINUED | OUTPATIENT
Start: 2023-07-27 | End: 2023-07-27

## 2023-07-27 RX ORDER — HYDROMORPHONE HCL/PF 1 MG/ML
0.5 SYRINGE (ML) INJECTION
Status: DISCONTINUED | OUTPATIENT
Start: 2023-07-27 | End: 2023-07-27 | Stop reason: HOSPADM

## 2023-07-27 RX ORDER — MIDAZOLAM HYDROCHLORIDE 2 MG/2ML
INJECTION, SOLUTION INTRAMUSCULAR; INTRAVENOUS AS NEEDED
Status: DISCONTINUED | OUTPATIENT
Start: 2023-07-27 | End: 2023-07-27

## 2023-07-27 RX ORDER — SODIUM CHLORIDE, SODIUM LACTATE, POTASSIUM CHLORIDE, CALCIUM CHLORIDE 600; 310; 30; 20 MG/100ML; MG/100ML; MG/100ML; MG/100ML
INJECTION, SOLUTION INTRAVENOUS CONTINUOUS PRN
Status: DISCONTINUED | OUTPATIENT
Start: 2023-07-27 | End: 2023-07-27

## 2023-07-27 RX ADMIN — MORPHINE SULFATE 4 MG: 4 INJECTION INTRAVENOUS at 12:20

## 2023-07-27 RX ADMIN — MORPHINE SULFATE 2 MG: 2 INJECTION, SOLUTION INTRAMUSCULAR; INTRAVENOUS at 14:24

## 2023-07-27 RX ADMIN — SODIUM CHLORIDE, SODIUM LACTATE, POTASSIUM CHLORIDE, AND CALCIUM CHLORIDE: .6; .31; .03; .02 INJECTION, SOLUTION INTRAVENOUS at 16:47

## 2023-07-27 RX ADMIN — FENTANYL CITRATE 50 MCG: 50 INJECTION, SOLUTION INTRAMUSCULAR; INTRAVENOUS at 17:17

## 2023-07-27 RX ADMIN — SODIUM CHLORIDE 125 ML/HR: 0.9 INJECTION, SOLUTION INTRAVENOUS at 23:01

## 2023-07-27 RX ADMIN — ONDANSETRON 4 MG: 2 INJECTION INTRAMUSCULAR; INTRAVENOUS at 16:53

## 2023-07-27 RX ADMIN — MIDAZOLAM 2 MG: 1 INJECTION INTRAMUSCULAR; INTRAVENOUS at 16:46

## 2023-07-27 RX ADMIN — GABAPENTIN 600 MG: 300 CAPSULE ORAL at 08:06

## 2023-07-27 RX ADMIN — MORPHINE SULFATE 4 MG: 4 INJECTION INTRAVENOUS at 18:14

## 2023-07-27 RX ADMIN — QUETIAPINE FUMARATE 50 MG: 25 TABLET ORAL at 20:33

## 2023-07-27 RX ADMIN — LEVOFLOXACIN 500 MG: 500 INJECTION, SOLUTION INTRAVENOUS at 18:52

## 2023-07-27 RX ADMIN — MORPHINE SULFATE 2 MG: 2 INJECTION, SOLUTION INTRAMUSCULAR; INTRAVENOUS at 05:51

## 2023-07-27 RX ADMIN — MORPHINE SULFATE 2 MG: 2 INJECTION, SOLUTION INTRAMUSCULAR; INTRAVENOUS at 10:11

## 2023-07-27 RX ADMIN — MORPHINE SULFATE 2 MG: 2 INJECTION, SOLUTION INTRAMUSCULAR; INTRAVENOUS at 00:12

## 2023-07-27 RX ADMIN — MORPHINE SULFATE 4 MG: 4 INJECTION INTRAVENOUS at 03:01

## 2023-07-27 RX ADMIN — NALOXONE HYDROCHLORIDE 0.04 MG: 0.4 INJECTION, SOLUTION INTRAMUSCULAR; INTRAVENOUS; SUBCUTANEOUS at 17:31

## 2023-07-27 RX ADMIN — LIDOCAINE HYDROCHLORIDE 50 MG: 10 INJECTION, SOLUTION EPIDURAL; INFILTRATION; INTRACAUDAL; PERINEURAL at 16:53

## 2023-07-27 RX ADMIN — GABAPENTIN 600 MG: 300 CAPSULE ORAL at 18:14

## 2023-07-27 RX ADMIN — PROPOFOL 200 MG: 10 INJECTION, EMULSION INTRAVENOUS at 16:53

## 2023-07-27 RX ADMIN — FENTANYL CITRATE 50 MCG: 50 INJECTION, SOLUTION INTRAMUSCULAR; INTRAVENOUS at 16:59

## 2023-07-27 RX ADMIN — NALOXONE HYDROCHLORIDE 0.04 MG: 0.4 INJECTION, SOLUTION INTRAMUSCULAR; INTRAVENOUS; SUBCUTANEOUS at 17:33

## 2023-07-27 RX ADMIN — SODIUM CHLORIDE 125 ML/HR: 0.9 INJECTION, SOLUTION INTRAVENOUS at 10:39

## 2023-07-27 RX ADMIN — MORPHINE SULFATE 2 MG: 2 INJECTION, SOLUTION INTRAMUSCULAR; INTRAVENOUS at 20:33

## 2023-07-27 RX ADMIN — SODIUM CHLORIDE 125 ML/HR: 0.9 INJECTION, SOLUTION INTRAVENOUS at 00:12

## 2023-07-27 RX ADMIN — MORPHINE SULFATE 4 MG: 4 INJECTION INTRAVENOUS at 08:06

## 2023-07-27 RX ADMIN — MORPHINE SULFATE 4 MG: 4 INJECTION INTRAVENOUS at 22:58

## 2023-07-27 RX ADMIN — TAMSULOSIN HYDROCHLORIDE 0.4 MG: 0.4 CAPSULE ORAL at 18:14

## 2023-07-27 RX ADMIN — DOCUSATE SODIUM 100 MG: 100 CAPSULE, LIQUID FILLED ORAL at 18:15

## 2023-07-27 RX ADMIN — BUPROPION HYDROCHLORIDE 150 MG: 150 TABLET, EXTENDED RELEASE ORAL at 08:06

## 2023-07-27 RX ADMIN — DEXAMETHASONE SODIUM PHOSPHATE 10 MG: 10 INJECTION, SOLUTION INTRAMUSCULAR; INTRAVENOUS at 16:53

## 2023-07-27 RX ADMIN — GABAPENTIN 600 MG: 300 CAPSULE ORAL at 21:35

## 2023-07-27 RX ADMIN — DOCUSATE SODIUM 100 MG: 100 CAPSULE, LIQUID FILLED ORAL at 10:12

## 2023-07-27 RX ADMIN — CEFTRIAXONE 1000 MG: 1 INJECTION, SOLUTION INTRAVENOUS at 17:02

## 2023-07-27 RX ADMIN — SERTRALINE HYDROCHLORIDE 150 MG: 50 TABLET ORAL at 08:06

## 2023-07-27 NOTE — PROGRESS NOTES
Progress Note - Urology      Patient: Adolfo Foote   : 1990 Sex: male   MRN: [id number]     CSN: [id number]  Unit/Bed#: [address] [place] [address]     SUBJECTIVE:   Patient seen on morning rounds continues to have significant pain right lower quadrant wishes to have stone out as soon as possible refusing to go home on medical expulsion therapy      Objective   Vitals: /78   Pulse 90   Temp 98.9 °F (37.2 °C)   Resp 18   Ht 6' (1.829 m)   Wt 90.9 kg (200 lb 8 oz)   SpO2 93%   BMI 27.19 kg/m²     I/O last 24 hours:   In: .2 [P.O.:5; I.V.:.2]  Out:  [Urine:]      Physical Exam:   General Alert awake   Normocephalic atraumatic PERRLA  Lungs clear bilaterally  Cardiac normal S1 normal S2  Abdomen soft, flank pain  Extremities no edema      Lab Results: CBC:   Lab Results   Component Value Date    WBC 11.03 (H) 2023    HGB 12.4 2023    HCT 37.3 2023    MCV 89 2023     2023    RBC 4.20 2023    MCH 29.5 2023    MCHC 33.2 2023    RDW 13.2 2023    MPV 10.0 2023    NRBC 0 2023     CMP:   Lab Results   Component Value Date     10/15/2015     2023     10/15/2015    CO2 23 2023    CO2 26 2023    ANIONGAP 12.3 10/15/2015    BUN 15 2023    BUN 10 10/15/2015    CREATININE 1.66 (H) 2023    CREATININE 0.9 10/15/2015    GLUCOSE 88 2023    GLUCOSE 91 10/15/2015    CALCIUM 8.4 2023    CALCIUM 8.6 10/15/2015    AST 17 2023    AST 31 10/13/2015    ALT 14 2023    ALT 25 10/13/2015    ALKPHOS 76 2023    ALKPHOS 94 10/13/2015    PROT 7.5 10/13/2015    BILITOT 0.9 10/13/2015    EGFR 52 2023     Urinalysis:   Lab Results   Component Value Date    COLORU Yellow 2023    CLARITYU Clear 2023    CLARITYU JIGAR (A) 10/13/2015    CLARITYU CLEAR 10/13/2015    SPECGRAV 1.025 2023    SPECGRAV 1.015 10/13/2015    PHUR 6.0 2023    PHUR 8.0 10/13/2015 LEUKOCYTESUR Negative 07/26/2023    LEUKOCYTESUR NEGATIVE 10/13/2015    NITRITE Negative 07/26/2023    NITRITE NEGATIVE 10/13/2015    PROTEINUA TRACE 10/13/2015    GLUCOSEU Negative 07/26/2023    GLUCOSEU NEGATIVE 10/13/2015    KETONESU Negative 07/26/2023    KETONESU NEGATIVE 10/13/2015    BILIRUBINUR Negative 07/26/2023    BILIRUBINUR NEGATIVE 10/13/2015    BLOODU Moderate (A) 07/26/2023    BLOODU NEGATIVE 10/13/2015     Urine Culture: No results found for: "URINECX"  PSA: No results found for: "PSA"      Assessment/ Plan:  Cystoscopy right ureteroscopy laser basket stent          Santa Duarte MD

## 2023-07-27 NOTE — ASSESSMENT & PLAN NOTE
Reports that he is currently doing well on current regimen  Continue with Wellbutrin, sertraline and Seroquel nightly

## 2023-07-27 NOTE — ASSESSMENT & PLAN NOTE
Creatinine noted to be 1.75 on presentation compared to 1.33 on 7/22  Baseline creatinine appears to be 0.9-1.2  UA with moderate blood, trace protein, 4-10 WBC moderate bacteria  CT abdomen 7/22-4 mm obstructing nephrolithiasis at right distal ureteral/UVJ junction with moderate hydronephrosis  Likely secondary to obstructive uropathy, patient also reports NSAID use for pain   Creatinine slightly elevated 1.66 today  · Continue IV fluids  · Monitor intake output  · Avoid NSAIDs, nephrotoxins  · Monitor BMP and electrolytes  · Follow-up urology intervention

## 2023-07-27 NOTE — OP NOTE
OPERATIVE REPORT  PATIENT NAME: Kristina Richards    :  1990  MRN: [id number]  Pt Location: [facility] OR ROOM 04    SURGERY DATE: 2023    Surgeon(s) and Role:     * Zachary Salazar MD - Primary    Preop Diagnosis:  Calculus of kidney [N20.0]  4 mm right distal ureterovesical junction stone    Post-Op Diagnosis Codes:     * Calculus of kidney [N20.0]   5 mm right distal ureterovesical junction stone  Complicated UTI    Procedure(s):  Right - CYSTOSCOPY RIGHT URETEROSCOPY. STONE MANIPULATION.  INSERTION STENT URETERAL    Specimen(s):  ID Type Source Tests Collected by Time Destination   A :  Urine Urine, Cystoscopic URINE CULTURE Zachary Salazar MD 2023 171        Estimated Blood Loss:   Minimal    Drains:  * No LDAs found *    Anesthesia Type:   IV Sedation with Anesthesia    Operative Indications:  Calculus of kidney [N210]  26-year-old male seen in the office 2 days ago presenting initially to [address] [place] [address] on  with acute onset right flank pain found to have 4 mm right distal ureterovesical junction stone seen in the office 2 days ago with unrelenting right flank pain started on medical expulsion therapy given analgesics calling me yesterday with worsening pain and nausea sent in to [address] [place] [address] ER admitted for IV analgesics now to undergo cystoscopy ureteroscopy stone extraction    Operative Findings:  5 mm distal ureterovesical junction stone  Right ureteroscopy confirming severely pyuric  urine proximal to the stone after placement of scope urine culture taken from right ureter   stone extraction/lithotripsy aborted 24 cm 6 Pashto stent passed    Complications:   None    Procedure and Technique:  Patient identified in the holding area right side marked consent signed placed in the op suite after anesthesia was induced placed in thigh position draped and prepped standard fashion timeout performed 22 Pashto cystoscope passed urethra the bladder anterior to showed the maladies posteriorly confirmed 2.0 cm bilobar prostatic urethra scope inserted to the bladder lumen 0.038 wire passed up the right orifice into the right renal pelvis the scope removed semirigid ureteroscope passed into the orifice with stone easily encountered and when passed  severe polyuric urine noted with debris a 10 cc syringe was placed on the side of the ureteroscope and selective right ureteral urine culture sent the scope was easily backloaded out of the ureter cystoscope was replaced and a 24 cm 6 Maltese stent was passed the wire moved scope removed. Procedure well awakened taken recovery in stable condition patient will receive appropriate antibiotics and be scheduled to come back for ureteroscopy stone extraction once cleared infection   I was present for the entire procedure.     Patient Disposition:  PACU         SIGNATURE: Carlotta Hugo MD  DATE: July 27, 2023  TIME: 5:32 PM

## 2023-07-27 NOTE — PROGRESS NOTES
[facility] Internal Medicine Progress Note  Patient: Corin Burton 26 y.o. male   MRN: [id number]  PCP: Akiko Nunez MD  Unit/Bed#: [address] [place] [address] Encounter: [id number]  Date Of Visit: 07/27/23    Problem List:    Principal Problem:    Nephrolithiasis with hydronephrosis  Active Problems:    Acute kidney injury ([address])    UTI (urinary tract infection)    Anxiety and depression    EtOH dependence ([address])      Assessment & Plan:    * Nephrolithiasis with hydronephrosis  Assessment & Plan  On presentation presented with persistent pain, right flank radiating to groin since 7/22  CT abdomen 7/22-4 mm obstructing nephrolithiasis at right distal ureteral/UVJ junction with moderate hydronephrosis. No prior history of nephrolithiasis. No improvement so far persistent pain medical expulsion therapy  Noted to have low-grade fever on admission, concerning for UTI and increasing creatinine  Remains afebrile. Still with persistent pain  · Continue IV fluid  · Monitor intake output  · Tamsulosin  · Strain urine  · Pain control  · Symptomatic treatment  · Urology evaluation appreciated, plan for cystoscopy with ureteral/ureteroscopy laser lithotripsy today    UTI (urinary tract infection)  Assessment & Plan  Complicated in setting of obstructive uropathy  Presented with low-grade fever and leukocytosis  UA with evidence of pyuria and bacteriuria. Patient reported dysuria and right flank pain  Remains afebrile.   Leukocytosis persists  · Follow-up urine culture  · Continue IV ceftriaxone    Acute kidney injury [facility])  Assessment & Plan  Creatinine noted to be 1.75 on presentation compared to 1.33 on 7/22  Baseline creatinine appears to be 0.9-1.2  UA with moderate blood, trace protein, 4-10 WBC moderate bacteria  CT abdomen 7/22-4 mm obstructing nephrolithiasis at right distal ureteral/UVJ junction with moderate hydronephrosis  Likely secondary to obstructive uropathy, patient also reports NSAID use for pain   Creatinine slightly elevated 1.66 today  · Continue IV fluids  · Monitor intake output  · Avoid NSAIDs, nephrotoxins  · Monitor BMP and electrolytes  · Follow-up urology intervention    EtOH dependence [facility])  Assessment & Plan  Currently sober, on Antabuse    Anxiety and depression  Assessment & Plan  Reports that he is currently doing well on current regimen  Continue with Wellbutrin, sertraline and Seroquel nightly          VTE Pharmacologic Prophylaxis: VTE Score: 1 Low Risk (Score 0-2) - Encourage Ambulation. Patient Centered Rounds: I performed bedside rounds with nursing staff today. Discussions with Specialists or Other Care Team Provider: Urology    Education and Discussions with Family / Patient: yes. Time Spent for Care: 45 minutes. More than 50% of total time spent on counseling and coordination of care as described above. Current Length of Stay: 0 day(s)  Current Patient Status: Outpatient Surgery   Certification Statement: The patient will continue to require additional inpatient hospital stay due to Complicated UTI, obstructing nephrolithiasis  Discharge Plan: Anticipate discharge later today or tomorrow to home. Code Status: Level 1 - Full Code    Subjective:   Remains with persistent right flank pain controlled with pain medication  Denies any fever or chills  Reports improvement in dysuria  Denies any nausea vomiting    Objective:     Vitals:   Temp (24hrs), Av.2 °F (37.3 °C), Min:98.5 °F (36.9 °C), Max:100.5 °F (38.1 °C)    Temp:  [98.5 °F (36.9 °C)-100.5 °F (38.1 °C)] 98.9 °F (37.2 °C)  HR:  [] 91  Resp:  [16-22] 16  BP: (121-146)/(77-82) 121/77  SpO2:  [90 %-97 %] 90 %  Body mass index is 27.19 kg/m². Input and Output Summary (last 24 hours):      Intake/Output Summary (Last 24 hours) at 2023 0848  Last data filed at 2023 0701  Gross per 24 hour   Intake 1505 ml   Output 1350 ml   Net 155 ml       Physical Exam:   Physical Exam  Constitutional:       General: He is not in acute distress. HENT:      Head: Normocephalic and atraumatic. Cardiovascular:      Rate and Rhythm: Normal rate. Pulmonary:      Effort: Pulmonary effort is normal. No respiratory distress. Breath sounds: No wheezing, rhonchi or rales. Chest:      Chest wall: No tenderness. Abdominal:      General: Bowel sounds are normal. There is no distension. Palpations: Abdomen is soft. Tenderness: There is no abdominal tenderness. There is right CVA tenderness. There is no guarding or rebound. Musculoskeletal:      Right lower leg: No edema. Left lower leg: No edema. Skin:     General: Skin is warm and dry. Findings: No rash. Neurological:      Mental Status: He is alert. Mental status is at baseline. Cranial Nerves: No cranial nerve deficit. Additional Data:     Labs:  Results from last 7 days   Lab Units 07/27/23  0545   WBC Thousand/uL 11.03*   HEMOGLOBIN g/dL 12.4   HEMATOCRIT % 37.3   PLATELETS Thousands/uL 183   NEUTROS PCT % 73   LYMPHS PCT % 14   MONOS PCT % 11   EOS PCT % 2     Results from last 7 days   Lab Units 07/27/23  0545 07/26/23  1428 07/22/23  1819   SODIUM mmol/L 138   < > 139   POTASSIUM mmol/L 4.4   < > 3.4*   CHLORIDE mmol/L 107   < > 108   CO2 mmol/L 23   < > 19*   BUN mg/dL 15   < > 10   CREATININE mg/dL 1.66*   < > 1.33*   ANION GAP mmol/L 8   < > 12   CALCIUM mg/dL 8.4   < > 9.2   ALBUMIN g/dL  --   --  4.2   TOTAL BILIRUBIN mg/dL  --   --  0.54   ALK PHOS U/L  --   --  76   ALT U/L  --   --  14   AST U/L  --   --  17   GLUCOSE RANDOM mg/dL 96   < > 106    < > = values in this interval not displayed.      Results from last 7 days   Lab Units 07/26/23  1428   INR  1.17             Results from last 7 days   Lab Units 07/26/23  1428   LACTIC ACID mmol/L 1.0       Lines/Drains:  Invasive Devices     Peripheral Intravenous Line  Duration           Peripheral IV 07/26/23 Right Antecubital <1 day                      Imaging: Reviewed radiology reports from this admission including: abdominal/pelvic CT    Recent Cultures (last 7 days):   Results from last 7 days   Lab Units 07/26/23  1442 07/26/23  1435   BLOOD CULTURE  Received in Microbiology Lab. Culture in Progress. Received in Microbiology Lab. Culture in Progress. Last 24 Hours Medication List:   Current Facility-Administered Medications   Medication Dose Route Frequency Provider Last Rate   • acetaminophen  650 mg Oral Q6H PRN Dheeraj Monterroso MD     • buPROPion  150 mg Oral Daily Dheeraj Monterroso MD     • cefTRIAXone  1,000 mg Intravenous Q24H Dheeraj Monterroso MD     • disulfiram  250 mg Oral Daily Dheeraj Monterroso MD     • docusate sodium  100 mg Oral BID Dheeraj Monterroso MD     • gabapentin  600 mg Oral TID Dheeraj Monterroso MD     • morphine injection  2 mg Intravenous Q4H PRN Dheeraj Monterroso MD     • morphine injection  4 mg Intravenous Q4H PRN Dheeraj Monterroso MD     • ondansetron  4 mg Intravenous Q6H PRN Dheeraj Monterroso MD     • polyethylene glycol  17 g Oral Daily PRN Dheeraj Monterroso MD     • QUEtiapine  50 mg Oral HS PRN Dheeraj Monterroso MD     • sertraline  150 mg Oral Daily Dheeraj Monterroso MD     • sodium chloride  125 mL/hr Intravenous Continuous Dheeraj Monterroso  mL/hr (07/27/23 0012)   • tamsulosin  0.4 mg Oral Daily With Candida Montgomery MD          Today, Patient Was Seen By: Dheeraj Monterroso MD    ** Please Note: "This note has been constructed using a voice recognition system. Therefore there may be syntax, spelling, and/or grammatical errors.  Please call if you have any questions. "**

## 2023-07-27 NOTE — UTILIZATION REVIEW
Initial Clinical Review    Admission: Date/Time/Statement:   Admission Orders (From admission, onward)     Ordered        07/26/23 1444  Place in Observation  Once                      Orders Placed This Encounter   Procedures   • Place in Observation     Standing Status:   Standing     Number of Occurrences:   1     Order Specific Question:   Level of Care     Answer:   Med Surg [16]     ED Arrival Information     Expected   -    Arrival   7/26/2023 13:56    Acuity   Urgent            Means of arrival   Walk-In    Escorted by   Self    Service   Hospitalist    Admission type   Emergency            Arrival complaint   right side back pain           Chief Complaint   Patient presents with   • Back Pain     Pain to R side, back and towards groin, here several days ago with stone       Initial Presentation:   26 yom to ER from home c/o continued right-sided flank right lower quadrant and groin pain over the past 4 days since being diagnosed with a right ureteral stone at 4 mm with signs of obstruction. Presents with 100.5 temperature and a heart rate of 109. Took Toradol prior to arrival.  He is placed on the schedule tomorrow for stone removal. Hx depression, ETOH abuse. Presents febrile, tachycardic, tachypneic, RLQ tenderness. Admission work-up showing leukocytosis , elevated creatinine, +UDS. Placed in observation status for nephrolithiasis with hydronephrosis. Started on IVABT, IVF, strain urine, urology consulted, blood & urine cultures pending. Per urol: 4 mm right distal ureterovesical junction stone has 95% chance of passing with medical expulsion therapy. Start tamsulosin 0.4 mg. Strain urine.  N.p.o. after midnight if patient does not pass stone in light of ongoing pain will take to the OR tomorrow for cystoscopy right ureteroscopy basket extraction stent placement        ED Triage Vitals [07/26/23 1404]   Temperature Pulse Respirations Blood Pressure SpO2   100.5 °F (38.1 °C) (!) 109 22 126/82 97 % Temp Source Heart Rate Source Patient Position - Orthostatic VS BP Location FiO2 (%)   Tympanic Monitor Sitting Right arm --      Pain Score       8          Wt Readings from Last 1 Encounters:   07/26/23 90.9 kg (200 lb 8 oz)     Additional Vital Signs:   Date/Time Temp Pulse Resp BP MAP (mmHg) SpO2 O2 Device Patient Position - Orthostatic VS   07/27/23 07:33:21 -- 91 -- 121/77 92 90 % -- --   07/26/23 23:55:43 -- 100 16 133/81 98 91 % -- --   07/26/23 19:32:48 98.9 °F (37.2 °C) 111 Abnormal  19 121/79 93 95 % None (Room air) Lying   07/26/23 1700 98.8 °F (37.1 °C) 98 18 129/81 -- -- -- --   07/26/23 1600 98.5 °F (36.9 °C) -- -- -- -- -- -- --   07/26/23 1545 -- 96 -- 139/78 101 97 % -- --   07/26/23 1500 -- 104 20 146/81 104 94 % None (Room air) Lying   07/26/23 1404 100.5 °F (38.1 °C) 109 Abnormal  22 126/82 -- 97 % None (Room air) Sitting     Pertinent Labs/Diagnostic Test Results:   XR abdomen 1 view kub    (Results Pending)         Results from last 7 days   Lab Units 07/27/23  0545 07/26/23  1428 07/22/23  1819   WBC Thousand/uL 11.03* 10.68* 7.97   HEMOGLOBIN g/dL 12.4 13.6 15.0   HEMATOCRIT % 37.3 39.5 41.5   PLATELETS Thousands/uL 183 175 222   NEUTROS ABS Thousands/µL 8.03* 8.64* 4.36         Results from last 7 days   Lab Units 07/27/23  0545 07/26/23  1428 07/22/23  1819   SODIUM mmol/L 138 138 139   POTASSIUM mmol/L 4.4 4.2 3.4*   CHLORIDE mmol/L 107 107 108   CO2 mmol/L 23 24 19*   ANION GAP mmol/L 8 7 12   BUN mg/dL 15 20 10   CREATININE mg/dL 1.66* 1.75* 1.33*   EGFR ml/min/1.73sq m 52 49 68   CALCIUM mg/dL 8.4 8.8 9.2     Results from last 7 days   Lab Units 07/22/23  1819   AST U/L 17   ALT U/L 14   ALK PHOS U/L 76   TOTAL PROTEIN g/dL 7.1   ALBUMIN g/dL 4.2   TOTAL BILIRUBIN mg/dL 0.54         Results from last 7 days   Lab Units 07/27/23  0545 07/26/23  1428 07/22/23  1819   GLUCOSE RANDOM mg/dL 96 167* 106             No results found for: "BETA-HYDROXYBUTYRATE" Results from last 7 days   Lab Units 07/26/23  1428   PROTIME seconds 15.0*   INR  1.17   PTT seconds 36             Results from last 7 days   Lab Units 07/26/23  1428   LACTIC ACID mmol/L 1.0                                                 Results from last 7 days   Lab Units 07/26/23  1437 07/22/23  1819   CLARITY UA  Clear Clear   COLOR UA  Yellow Yellow   SPEC GRAV UA  1.025 1.020   PH UA  6.0 8.0   GLUCOSE UA mg/dl Negative Negative   KETONES UA mg/dl Negative Negative   BLOOD UA  Moderate* Large*   PROTEIN UA mg/dl Trace* 30 (1+)*   NITRITE UA  Negative Negative   BILIRUBIN UA  Negative Negative   UROBILINOGEN UA E.U./dl 1.0 1.0   LEUKOCYTES UA  Negative Negative   WBC UA /hpf 4-10* 1-2   RBC UA /hpf 2-4 20-30*   BACTERIA UA /hpf Moderate* Occasional   EPITHELIAL CELLS WET PREP /hpf Occasional Occasional             Results from last 7 days   Lab Units 07/22/23  1819   AMPH/METH  Negative   BARBITURATE UR  Negative   BENZODIAZEPINE UR  Positive*   COCAINE UR  Negative   METHADONE URINE  Negative   OPIATE UR  Positive*   PCP UR  Negative   THC UR  Negative                     Results from last 7 days   Lab Units 07/26/23  1442 07/26/23  1435   BLOOD CULTURE  Received in Microbiology Lab. Culture in Progress. Received in Microbiology Lab. Culture in Progress.                    ED Treatment:   Medication Administration from 07/26/2023 1356 to 07/26/2023 1624       Date/Time Order Dose Route Action     07/26/2023 1445 EDT sodium chloride 0.9 % bolus 1,000 mL 1,000 mL Intravenous New Bag     07/26/2023 1445 EDT ondansetron (ZOFRAN) injection 4 mg 4 mg Intravenous Given     07/26/2023 1450 EDT morphine injection 4 mg 4 mg Intravenous Given     07/26/2023 1611 EDT cefTRIAXone (ROCEPHIN) IVPB (premix in dextrose) 1,000 mg 50 mL 1,000 mg Intravenous New Bag        Past Medical History:   Diagnosis Date   • Depression    • ETOH abuse      Present on Admission:  • Anxiety and depression  • EtOH dependence ([address])  • Acute kidney injury ([address] [facility])  • UTI (urinary tract infection)  • Nephrolithiasis with hydronephrosis      Admitting Diagnosis: Back pain [M54.9]  Hyperglycemia [R73.9]  BILL (acute kidney injury) ([address] [facility]) [N17.9]  Ureteral stone with hydronephrosis [N13.2]  Age/Sex: 26 y.o. male  Admission Orders:  Strain all urine  Consult urology    Scheduled Medications:  buPROPion, 150 mg, Oral, Daily  cefTRIAXone, 1,000 mg, Intravenous, Q24H  disulfiram, 250 mg, Oral, Daily  gabapentin, 600 mg, Oral, TID  sertraline, 150 mg, Oral, Daily  tamsulosin, 0.4 mg, Oral, Daily With Dinner    Continuous IV Infusions:  sodium chloride, 125 mL/hr, Intravenous, Continuous    PRN Meds:  acetaminophen, 650 mg, Oral, Q6H PRN  morphine injection, 2 mg, Intravenous, Q4H PRN  morphine injection, 4 mg, Intravenous, Q4H PRN  ondansetron, 4 mg, Intravenous, Q6H PRN  QUEtiapine, 50 mg, Oral, HS PRN    Network Utilization Review Department  ATTENTION: Please call with any questions or concerns to [phone] and carefully listen to the prompts so that you are directed to the right person. All voicemails are confidential.  Raffi Stewart all requests for admission clinical reviews, approved or denied determinations and any other requests to dedicated fax number below belonging to the campus where the patient is receiving treatment.  List of dedicated fax numbers for the Facilities:  [place] DENIALS (Administrative/Medical Necessity) [phone]   [address] [facility] (Maternity/NICU/Pediatrics) [phone]   [address] [place] [address] Drive [phone]   [facility] [address] [facility] [phone] [address] [place] [address] [id number] [place] [address] [phone]   [facility] [address] [facility] [phone]   [address] [place] [address]  [address]  [phone]

## 2023-07-27 NOTE — ANESTHESIA PREPROCEDURE EVALUATION
Procedure:  CYSTOSCOPY URETEROSCOPY WITH LITHOTRIPSY HOLMIUM LASER, RETROGRADE PYELOGRAM AND INSERTION STENT URETERAL (Right: Bladder)    Relevant Problems   /RENAL   (+) Acute kidney injury ([address])   (+) Nephrolithiasis with hydronephrosis      NEURO/PSYCH   (+) Anxiety and depression      Genitourinary   (+) UTI (urinary tract infection)      Other   (+) EtOH dependence (HCC)        Physical Exam    Airway    Mallampati score: II  TM Distance: >3 FB  Neck ROM: full     Dental   Comment: Denies loose teeth,     Cardiovascular  Cardiovascular exam normal    Pulmonary  Pulmonary exam normal     Other Findings  Portions of exam deferred due to low yield and/or unknown COVID status      Anesthesia Plan  ASA Score- 2     Anesthesia Type- general with ASA Monitors. Additional Monitors:   Airway Plan: LMA. Plan Factors-Exercise tolerance (METS): >4 METS. Chart reviewed. Existing labs reviewed. Patient summary reviewed. Patient is not a current smoker. Induction- intravenous. Postoperative Plan-     Informed Consent- Anesthetic plan and risks discussed with patient. I personally reviewed this patient with the CRNA. Discussed and agreed on the Anesthesia Plan with the CRNA. Amy Sandoval

## 2023-07-27 NOTE — ASSESSMENT & PLAN NOTE
On presentation presented with persistent pain, right flank radiating to groin since 7/22  CT abdomen 7/22-4 mm obstructing nephrolithiasis at right distal ureteral/UVJ junction with moderate hydronephrosis. No prior history of nephrolithiasis. No improvement so far persistent pain medical expulsion therapy  Noted to have low-grade fever on admission, concerning for UTI and increasing creatinine  Remains afebrile.   Still with persistent pain  · Continue IV fluid  · Monitor intake output  · Tamsulosin  · Strain urine  · Pain control  · Symptomatic treatment  · Urology evaluation appreciated, plan for cystoscopy with ureteral/ureteroscopy laser lithotripsy today

## 2023-07-27 NOTE — PLAN OF CARE
Problem: PAIN - ADULT  Goal: Verbalizes/displays adequate comfort level or baseline comfort level  Description: Interventions:  - Encourage patient to monitor pain and request assistance  - Assess pain using appropriate pain scale  - Administer analgesics based on type and severity of pain and evaluate response  - Implement non-pharmacological measures as appropriate and evaluate response  - Consider cultural and social influences on pain and pain management  - Notify physician/advanced practitioner if interventions unsuccessful or patient reports new pain  Outcome: Progressing     Problem: GENITOURINARY - ADULT  Goal: Maintains or returns to baseline urinary function  Description: INTERVENTIONS:  - Assess urinary function  - Encourage oral fluids to ensure adequate hydration if ordered  - Administer IV fluids as ordered to ensure adequate hydration  - Administer ordered medications as needed  - Offer frequent toileting  - Follow urinary retention protocol if ordered  Outcome: Progressing

## 2023-07-27 NOTE — ASSESSMENT & PLAN NOTE
Complicated in setting of obstructive uropathy  Presented with low-grade fever and leukocytosis  UA with evidence of pyuria and bacteriuria. Patient reported dysuria and right flank pain  Remains afebrile.   Leukocytosis persists  · Follow-up urine culture  · Continue IV ceftriaxone

## 2023-07-27 NOTE — ANESTHESIA POSTPROCEDURE EVALUATION
Post-Op Assessment Note    CV Status:  Stable    Pain management: adequate     Mental Status:  Alert and awake   Hydration Status:  Euvolemic   PONV Controlled:  Controlled   Airway Patency:  Patent      Post Op Vitals Reviewed: Yes      Staff: Anesthesiologist         No notable events documented.     /69 (07/27/23 1737)    Temp      Pulse (!) 107 (07/27/23 1737)   Resp 22 (07/27/23 1737)    SpO2 95 % (07/27/23 1737)    Pt somnolent, narcan given per record

## 2023-07-28 VITALS
SYSTOLIC BLOOD PRESSURE: 108 MMHG | OXYGEN SATURATION: 95 % | DIASTOLIC BLOOD PRESSURE: 63 MMHG | BODY MASS INDEX: 27.16 KG/M2 | RESPIRATION RATE: 18 BRPM | HEART RATE: 74 BPM | HEIGHT: 72 IN | WEIGHT: 200.5 LBS | TEMPERATURE: 97.9 F

## 2023-07-28 LAB
ANION GAP SERPL CALCULATED.3IONS-SCNC: 6 MMOL/L
BACTERIA UR CULT: NORMAL
BUN SERPL-MCNC: 16 MG/DL (ref 5–25)
CALCIUM SERPL-MCNC: 9 MG/DL (ref 8.4–10.2)
CHLORIDE SERPL-SCNC: 107 MMOL/L (ref 96–108)
CO2 SERPL-SCNC: 27 MMOL/L (ref 21–32)
CREAT SERPL-MCNC: 1.09 MG/DL (ref 0.6–1.3)
ERYTHROCYTE [DISTWIDTH] IN BLOOD BY AUTOMATED COUNT: 13 % (ref 11.6–15.1)
GFR SERPL CREATININE-BSD FRML MDRD: 87 ML/MIN/1.73SQ M
GLUCOSE SERPL-MCNC: 148 MG/DL (ref 65–140)
HCT VFR BLD AUTO: 37.6 % (ref 36.5–49.3)
HGB BLD-MCNC: 12.6 G/DL (ref 12–17)
MCH RBC QN AUTO: 29.8 PG (ref 26.8–34.3)
MCHC RBC AUTO-ENTMCNC: 33.5 G/DL (ref 31.4–37.4)
MCV RBC AUTO: 89 FL (ref 82–98)
PLATELET # BLD AUTO: 189 THOUSANDS/UL (ref 149–390)
PMV BLD AUTO: 9.2 FL (ref 8.9–12.7)
POTASSIUM SERPL-SCNC: 5 MMOL/L (ref 3.5–5.3)
RBC # BLD AUTO: 4.23 MILLION/UL (ref 3.88–5.62)
SODIUM SERPL-SCNC: 140 MMOL/L (ref 135–147)
WBC # BLD AUTO: 10.67 THOUSAND/UL (ref 4.31–10.16)

## 2023-07-28 PROCEDURE — 80048 BASIC METABOLIC PNL TOTAL CA: CPT | Performed by: INTERNAL MEDICINE

## 2023-07-28 PROCEDURE — 99239 HOSP IP/OBS DSCHRG MGMT >30: CPT | Performed by: INTERNAL MEDICINE

## 2023-07-28 PROCEDURE — 85027 COMPLETE CBC AUTOMATED: CPT | Performed by: INTERNAL MEDICINE

## 2023-07-28 RX ORDER — BUPROPION HYDROCHLORIDE 150 MG/1
150 TABLET ORAL DAILY
Refills: 0
Start: 2023-07-29

## 2023-07-28 RX ORDER — CEFPODOXIME PROXETIL 100 MG/1
200 TABLET, FILM COATED ORAL 2 TIMES DAILY
Qty: 20 TABLET | Refills: 0 | Status: SHIPPED | OUTPATIENT
Start: 2023-07-29 | End: 2023-08-03

## 2023-07-28 RX ORDER — OXYCODONE HYDROCHLORIDE 5 MG/1
5 TABLET ORAL EVERY 6 HOURS PRN
Status: DISCONTINUED | OUTPATIENT
Start: 2023-07-28 | End: 2023-07-28 | Stop reason: HOSPADM

## 2023-07-28 RX ORDER — QUETIAPINE FUMARATE 50 MG/1
50 TABLET, FILM COATED ORAL
Refills: 0
Start: 2023-07-28

## 2023-07-28 RX ADMIN — CEFTRIAXONE 1000 MG: 1 INJECTION, SOLUTION INTRAVENOUS at 15:46

## 2023-07-28 RX ADMIN — BUPROPION HYDROCHLORIDE 150 MG: 150 TABLET, EXTENDED RELEASE ORAL at 08:33

## 2023-07-28 RX ADMIN — GABAPENTIN 600 MG: 300 CAPSULE ORAL at 15:46

## 2023-07-28 RX ADMIN — TAMSULOSIN HYDROCHLORIDE 0.4 MG: 0.4 CAPSULE ORAL at 15:46

## 2023-07-28 RX ADMIN — SERTRALINE HYDROCHLORIDE 150 MG: 50 TABLET ORAL at 08:33

## 2023-07-28 RX ADMIN — DOCUSATE SODIUM 100 MG: 100 CAPSULE, LIQUID FILLED ORAL at 17:01

## 2023-07-28 RX ADMIN — MORPHINE SULFATE 2 MG: 2 INJECTION, SOLUTION INTRAMUSCULAR; INTRAVENOUS at 10:02

## 2023-07-28 RX ADMIN — OXYCODONE HYDROCHLORIDE 5 MG: 5 TABLET ORAL at 16:52

## 2023-07-28 RX ADMIN — MORPHINE SULFATE 2 MG: 2 INJECTION, SOLUTION INTRAMUSCULAR; INTRAVENOUS at 13:48

## 2023-07-28 RX ADMIN — DOCUSATE SODIUM 100 MG: 100 CAPSULE, LIQUID FILLED ORAL at 08:33

## 2023-07-28 RX ADMIN — MORPHINE SULFATE 2 MG: 2 INJECTION, SOLUTION INTRAMUSCULAR; INTRAVENOUS at 03:24

## 2023-07-28 RX ADMIN — GABAPENTIN 600 MG: 300 CAPSULE ORAL at 08:33

## 2023-07-28 NOTE — DISCHARGE SUMMARY
Discharge Summary - [facility]    Patient Information: Beverly Jacques 26 y.o. male MRN: [id number]  Unit/Bed#: [address] [place] [address] Encounter: [id number]    Discharging Physician / Practitioner: Tg Payton MD  PCP: Fran Day MD  Admission Date: 7/26/2023  Discharge Date: 07/28/23    Reason for Admission: Back Pain (Pain to R side, back and towards groin, here several days ago with stone)      Discharge Diagnoses:     Principal Problem:    Nephrolithiasis with hydronephrosis  Active Problems:    Acute kidney injury ([address])    UTI (urinary tract infection)    Anxiety and depression    EtOH dependence ([address])  Resolved Problems:    * No resolved hospital problems. *        * Nephrolithiasis with hydronephrosis  Assessment & Plan  On presentation presented with persistent pain, right flank radiating to groin since 7/22  CT abdomen 7/22-4 mm obstructing nephrolithiasis at right distal ureteral/UVJ junction with moderate hydronephrosis. No prior history of nephrolithiasis. No improvement so far persistent pain medical expulsion therapy  Noted to have low-grade fever on admission, concerning for UTI and increasing creatinine  Remains afebrile. Still with persistent pain  · Continue IV fluid  · Monitor intake output  · Tamsulosin  · Strain urine  · Pain control  · Symptomatic treatment  · Urology evaluation appreciated, plan for cystoscopy with ureteral/ureteroscopy laser lithotripsy today    UTI (urinary tract infection)  Assessment & Plan  Complicated in setting of obstructive uropathy  Presented with low-grade fever and leukocytosis  UA with evidence of pyuria and bacteriuria. Patient reported dysuria and right flank pain  Remains afebrile.   Leukocytosis persists  · Follow-up urine culture  · Continue IV ceftriaxone    Acute kidney injury [facility])  Assessment & Plan  Creatinine noted to be 1.75 on presentation compared to 1.33 on 7/22  Baseline creatinine appears to be 0.9-1.2  UA with moderate blood, trace protein, 4-10 WBC moderate bacteria  CT abdomen 7/22-4 mm obstructing nephrolithiasis at right distal ureteral/UVJ junction with moderate hydronephrosis  Likely secondary to obstructive uropathy, patient also reports NSAID use for pain   Creatinine slightly elevated 1.66 today  · Continue IV fluids  · Monitor intake output  · Avoid NSAIDs, nephrotoxins  · Monitor BMP and electrolytes  · Follow-up urology intervention    EtOH dependence [facility])  Assessment & Plan  Currently sober, on Antabuse    Anxiety and depression  Assessment & Plan  Reports that he is currently doing well on current regimen  Continue with Wellbutrin, sertraline and Seroquel nightly        Consultations During Hospital Stay:  IP CONSULT TO UROLOGY    Procedures Performed:     Procedure(s):  · CYSTOSCOPY URETEROSCOPY WITH LITHOTRIPSY HOLMIUM LASER, RETROGRADE PYELOGRAM AND INSERTION STENT URETERAL    Significant Findings:     · Refer to hospital course and above listed diagnosis related plan for details    Imaging while in hospital:    XR abdomen 1 view kub    Result Date: 7/27/2023  Narrative: ABDOMEN INDICATION:   know right ureteral stone. COMPARISON:  None VIEWS:  AP supine FINDINGS: No radiopaque renal calculi seen. Multiple phleboliths are present in the right pelvis making localization of the ureteral calculus challenging. However, there is a 5 mm calcification medial to the largest phlebolith likely representing the distal ureteral calculus. Nonobstructive bowel gas pattern. No acute osseous abnormality is seen. Impression: 5 mm distal right ureteral calculus Workstation performed: [id number]     CT renal stone study abdomen pelvis without contrast    Result Date: 7/22/2023  Narrative: CT ABDOMEN AND PELVIS WITHOUT IV CONTRAST - LOW DOSE RENAL STONE INDICATION:   right groin pain. COMPARISON: 6/13/2023.  TECHNIQUE:  Low radiation dose thin section CT examination of the abdomen and pelvis was performed without intravenous or oral contrast according to a protocol specifically designed to evaluate for urinary tract calculus. Axial, sagittal, and coronal 2D  reformatted images were created from the source data and submitted for interpretation. Evaluation for pathology in the abdomen and pelvis that is unrelated to urinary tract calculi is limited. Radiation dose length product (DLP) for this visit:  313.77 mGy-cm . This examination, like all CT scans performed in the [facility], was performed utilizing techniques to minimize radiation dose exposure, including the use of iterative  reconstruction and automated exposure control. URINARY TRACT FINDINGS: RIGHT KIDNEY AND URETER: 4 mm obstructing calculus at the right distal ureter/ureterovesicular junction with moderate upstream hydroureteronephrosis. LEFT KIDNEY AND URETER:  No urinary tract calculi. No hydronephrosis or hydroureter. URINARY BLADDER: Mild circumferential wall thickening although limited by underdistention. ADDITIONAL FINDINGS: LOWER CHEST:  No clinically significant abnormality identified in the visualized lower chest. SOLID VISCERA: Limited low radiation dose noncontrast CT evaluation demonstrates no clinically significant abnormality of the imaged portions of the liver, spleen, pancreas, or adrenal glands. GALLBLADDER/BILIARY TREE:  No calcified gallstones. No pericholecystic inflammatory change. No biliary dilatation. STOMACH AND BOWEL:  Unremarkable. APPENDIX:  A normal appendix was visualized. ABDOMINOPELVIC CAVITY:  No ascites. No pneumoperitoneum. No lymphadenopathy. VESSELS: Unremarkable for patient's age. REPRODUCTIVE ORGANS:  Unremarkable for patient's age. ABDOMINAL WALL/INGUINAL REGIONS:  Unremarkable. OSSEOUS STRUCTURES:  No acute fracture or destructive osseous lesion. Impression: 4 mm obstructing calculus at the right distal ureter/ureterovesicular junction with moderate upstream hydroureteronephrosis.  Mild bladder wall thickening although limited by underdistention. Correlate with urinalysis to exclude infectious etiology. Workstation performed: [id number]       Incidental Findings:   · none    Test Results Pending at Discharge (will require follow up):   · As per After Visit Summary     Outpatient Tests Requested:  · none    Complications:  Refer to hospital course and above listed diagnosis related plan, if any    Hospital Course:     Phil Kincaid is a 26 y.o. male patient who originally presented to the hospital on 7/26/2023 due to ***        Please see above list of diagnoses and related plan for additional information. Condition at Discharge: stable     Discharge Day Visit / Exam:     Subjective: Feels well  Reports significant improvement in flank pain  Denies any dysuria  Denies any fever or chills  Tolerating diet  Looking forward to go home, discussed with patient that final urine culture result is pending. May require revision of oral antimicrobial treatment depending upon final culture results    Vitals: Blood Pressure: 108/63 (07/28/23 1538)  Pulse: 74 (07/28/23 1538)  Temperature: 97.9 °F (36.6 °C) (07/28/23 1538)  Temp Source: Axillary (07/27/23 0733)  Respirations: 18 (07/28/23 1538)  Height: 6' (182.9 cm) (07/26/23 1700)  Weight - Scale: 90.9 kg (200 lb 8 oz) (07/26/23 1700)  SpO2: 95 % (07/28/23 1538)  Exam:   Physical Exam  Constitutional:       General: He is not in acute distress. HENT:      Head: Normocephalic and atraumatic. Cardiovascular:      Rate and Rhythm: Normal rate. Pulmonary:      Effort: Pulmonary effort is normal. No respiratory distress. Breath sounds: No wheezing, rhonchi or rales. Chest:      Chest wall: No tenderness. Abdominal:      General: Bowel sounds are normal. There is no distension. Palpations: Abdomen is soft. Tenderness: There is no abdominal tenderness. There is no right CVA tenderness, left CVA tenderness, guarding or rebound.    Musculoskeletal:      Right lower leg: No edema. Left lower leg: No edema. Skin:     General: Skin is warm and dry. Findings: No rash. Neurological:      General: No focal deficit present. Mental Status: He is alert and oriented to person, place, and time. Mental status is at baseline. Cranial Nerves: No cranial nerve deficit. Discharge instructions/Information to patient and family:(Discharge Medications and Follow up):   See after visit summary for information provided to patient and family. Provisions for Follow-Up Care:  See after visit summary for information related to follow-up care and any pertinent home health orders. Disposition: Home    Planned Readmission:  No     Discharge Statement:  I spent 40 minutes discharging the patient. This time was spent on the day of discharge. I had direct contact with the patient on the day of discharge. Greater than 50% of the total time was spent examining patient, answering all patient questions, arranging and discussing plan of care with patient as well as directly providing post-discharge instructions. Additional time then spent on discharge activities. Coordinated with urology    Discharge Medications:  See after visit summary for reconciled discharge medications provided to patient and family. ** Please Note: "This note has been constructed using a voice recognition system. Therefore there may be syntax, spelling, and/or grammatical errors.  Please call if you have any questions. "** left CVA tenderness, guarding or rebound. Musculoskeletal:      Right lower leg: No edema. Left lower leg: No edema. Skin:     General: Skin is warm and dry. Findings: No rash. Neurological:      General: No focal deficit present. Mental Status: He is alert and oriented to person, place, and time. Mental status is at baseline. Cranial Nerves: No cranial nerve deficit. Discharge instructions/Information to patient and family:(Discharge Medications and Follow up):   See after visit summary for information provided to patient and family. Provisions for Follow-Up Care:  See after visit summary for information related to follow-up care and any pertinent home health orders. Disposition: Home    Planned Readmission:  No     Discharge Statement:  I spent 40 minutes discharging the patient. This time was spent on the day of discharge. I had direct contact with the patient on the day of discharge. Greater than 50% of the total time was spent examining patient, answering all patient questions, arranging and discussing plan of care with patient as well as directly providing post-discharge instructions. Additional time then spent on discharge activities. Coordinated with urology    Discharge Medications:  See after visit summary for reconciled discharge medications provided to patient and family. ** Please Note: "This note has been constructed using a voice recognition system. Therefore there may be syntax, spelling, and/or grammatical errors.  Please call if you have any questions. "**

## 2023-07-28 NOTE — PLAN OF CARE
Problem: PAIN - ADULT  Goal: Verbalizes/displays adequate comfort level or baseline comfort level  Description: Interventions:  - Encourage patient to monitor pain and request assistance  - Assess pain using appropriate pain scale  - Administer analgesics based on type and severity of pain and evaluate response  - Implement non-pharmacological measures as appropriate and evaluate response  - Consider cultural and social influences on pain and pain management  - Notify physician/advanced practitioner if interventions unsuccessful or patient reports new pain  7/28/2023 1023 by Miladys Thornton RN  Outcome: Progressing  7/28/2023 1022 by Miladys Thornton RN  Outcome: Progressing     Problem: GENITOURINARY - ADULT  Goal: Maintains or returns to baseline urinary function  Description: INTERVENTIONS:  - Assess urinary function  - Encourage oral fluids to ensure adequate hydration if ordered  - Administer IV fluids as ordered to ensure adequate hydration  - Administer ordered medications as needed  - Offer frequent toileting  - Follow urinary retention protocol if ordered  7/28/2023 1023 by Miladys Thornton RN  Outcome: Progressing  7/28/2023 1022 by Miladys Thornton RN  Outcome: Progressing  Goal: Absence of urinary retention  Description: INTERVENTIONS:  - Assess patient’s ability to void and empty bladder  - Monitor I/O  - Bladder scan as needed  - Discuss with physician/AP medications to alleviate retention as needed  - Discuss catheterization for long term situations as appropriate  7/28/2023 1023 by Miladys Thornton RN  Outcome: Progressing  7/28/2023 1022 by Miladys Thornton RN  Outcome: Progressing     Problem: INFECTION - ADULT  Goal: Absence or prevention of progression during hospitalization  Description: INTERVENTIONS:  - Assess and monitor for signs and symptoms of infection  - Monitor lab/diagnostic results  - Monitor all insertion sites, i.e. indwelling lines, tubes, and drains  - Monitor endotracheal if appropriate and nasal secretions for changes in amount and color  - [place] appropriate cooling/warming therapies per order  - Administer medications as ordered  - Instruct and encourage patient and family to use good hand hygiene technique  - Identify and instruct in appropriate isolation precautions for identified infection/condition  Outcome: Progressing

## 2023-07-28 NOTE — NURSING NOTE
Patient d/c to home. AVS reviewed with patient, all questions answered. IV removed per policy and procedure. Occlusive dressing applied. Patient left with all belongings.

## 2023-07-29 LAB — BACTERIA UR CULT: NORMAL

## 2023-07-30 LAB
BACTERIA BLD CULT: NORMAL
BACTERIA BLD CULT: NORMAL

## 2023-07-31 LAB
BACTERIA BLD CULT: NORMAL
BACTERIA BLD CULT: NORMAL

## 2023-08-03 PROBLEM — N17.9 ACUTE KIDNEY INJURY (HCC): Status: RESOLVED | Noted: 2023-07-26 | Resolved: 2023-08-03

## 2023-08-09 ENCOUNTER — ANESTHESIA EVENT (OUTPATIENT)
Dept: PERIOP | Facility: HOSPITAL | Age: 33
End: 2023-08-09
Payer: COMMERCIAL

## 2023-08-09 NOTE — ANESTHESIA PREPROCEDURE EVALUATION
Procedure:  CYSTOSCOPY URETEROSCOPY WITH LITHOTRIPSY HOLMIUM LASER, RETROGRADE PYELOGRAM AND INSERTION STENT URETERAL (Right: Bladder)    Relevant Problems   /RENAL   (+) Nephrolithiasis with hydronephrosis      MUSCULOSKELETAL  left wrist hardware, and jawline hardware      NEURO/PSYCH   (+) Anxiety and depression   (+) CVA (cerebral vascular accident) ([address]) 2011 - no residual      PULMONARY   (+) Smoking      Other   (+) Suicide attempt [facility])        Physical Exam    Airway    Mallampati score: II  TM Distance: >3 FB  Neck ROM: full     Dental       Cardiovascular  Rhythm: regular, Rate: normal,     Pulmonary  Breath sounds clear to auscultation,     Other Findings        Anesthesia Plan  ASA Score- 2     Anesthesia Type- general with ASA Monitors. Additional Monitors:   Airway Plan: LMA. Plan Factors-    Chart reviewed. Patient is not a current smoker. Induction- intravenous. Postoperative Plan- Plan for postoperative opioid use. Informed Consent- Anesthetic plan and risks discussed with patient. I personally reviewed this patient with the CRNA. Discussed and agreed on the Anesthesia Plan with the CRNA. Milton Dennison

## 2023-08-09 NOTE — H&P
H&P Exam - Urology       Patient: Kodi Chin   : 1990 Sex: male   MRN: [id number]     CSN: [id number]      History of Present Illness   HPI:  Kodi Chin is a 26 y.o. male who presents with. Complicated UTI 2 weeks ago found to have 5 mm right distal ureterovesical junction stone sent in to [address] [place] [address] ER and admitted undergoing emergency cystoscopy right stent placement. Patient was sent home on appropriate antibiotics recently seen in the office found on urine analysis to clear infection now to undergo ureteroscopy lithotripsy stone extraction aware risk of anesthesia infection bleeding additional urologic procedures        Review of Systems:   Constitutional:  Negative for activity change, fever, chills and diaphoresis. HENT: Negative for hearing loss and trouble swallowing. Eyes: Negative for itching and visual disturbance. Respiratory: Negative for chest tightness and shortness of breath. Cardiovascular: Negative for chest pain, edema. Gastrointestinal: Negative for abdominal distention, na abdominal pain, constipation, diarrhea, Nausea and vomiting. Genitourinary: Negative for decreased urine volume, difficulty urinating, dysuria, enuresis, frequency, hematuria and urgency. Musculoskeletal: Negative for gait problem and myalgias. Neurological: Negative for dizziness and headaches. Hematological: Does not bruise/bleed easily.        Historical Information   Past Medical History:   Diagnosis Date   • Depression    • ETOH abuse    • Kidney stone      Past Surgical History:   Procedure Laterality Date   • [place] CYSTO/URETERO W/LITHOTRIPSY &INDWELL STENT INSRT Right 2023    Procedure: CYSTOSCOPY RIGHT URETEROSCOPY, STONE MANIPULATION, INSERTION STENT URETERAL;  Surgeon: Milton Kwong MD;  Location: [facility];  Service: Urology     Social History   Social History     Substance and Sexual Activity   Alcohol Use Yes   • Alcohol/week: 6.0 standard drinks of alcohol   • Types: 2 Glasses of wine, 4 Cans of beer per week    Comment: 2 pints of vodka or more with binge drinking      Social History     Substance and Sexual Activity   Drug Use Never     Social History     Tobacco Use   Smoking Status Never   Smokeless Tobacco Never     Family History: No family history on file. Meds/Allergies   No medications prior to admission. Allergies   Allergen Reactions   • Iv Dye [Iodinated Contrast Media] Anaphylaxis   • Tetanus-Diphth-Acell Pertussis Anaphylaxis       Objective   Vitals: There were no vitals taken for this visit. Physical Exam:  General Alert awake   Normocephalic atraumatic PERRLA  Lungs clear bilaterally  Cardiac normal S1 normal S2  Abdomen soft, flank pain  Extremities no edema    No intake/output data recorded.     Invasive Devices     None                     Lab Results: CBC:   Lab Results   Component Value Date    WBC 10.67 (H) 07/28/2023    HGB 12.6 07/28/2023    HCT 37.6 07/28/2023    MCV 89 07/28/2023     07/28/2023    RBC 4.23 07/28/2023    MCH 29.8 07/28/2023    MCHC 33.5 07/28/2023    RDW 13.0 07/28/2023    MPV 9.2 07/28/2023    NRBC 0 07/27/2023     CMP:   Lab Results   Component Value Date     10/15/2015     07/28/2023     10/15/2015    CO2 27 07/28/2023    CO2 26 06/13/2023    ANIONGAP 12.3 10/15/2015    BUN 16 07/28/2023    BUN 10 10/15/2015    CREATININE 1.09 07/28/2023    CREATININE 0.9 10/15/2015    GLUCOSE 88 06/13/2023    GLUCOSE 91 10/15/2015    CALCIUM 9.0 07/28/2023    CALCIUM 8.6 10/15/2015    AST 17 07/22/2023    AST 31 10/13/2015    ALT 14 07/22/2023    ALT 25 10/13/2015    ALKPHOS 76 07/22/2023    ALKPHOS 94 10/13/2015    PROT 7.5 10/13/2015    BILITOT 0.9 10/13/2015    EGFR 87 07/28/2023     Urinalysis:   Lab Results   Component Value Date    COLORU Yellow 07/26/2023    CLARITYU Clear 07/26/2023    CLARITYU JIGAR (A) 10/13/2015    CLARITYU CLEAR 10/13/2015    SPECGRAV 1.025 07/26/2023    SPECGRAV 1.015 10/13/2015 PHUR 6.0 07/26/2023    PHUR 8.0 10/13/2015    LEUKOCYTESUR Negative 07/26/2023    LEUKOCYTESUR NEGATIVE 10/13/2015    NITRITE Negative 07/26/2023    NITRITE NEGATIVE 10/13/2015    PROTEINUA TRACE 10/13/2015    GLUCOSEU Negative 07/26/2023    GLUCOSEU NEGATIVE 10/13/2015    KETONESU Negative 07/26/2023    KETONESU NEGATIVE 10/13/2015    BILIRUBINUR Negative 07/26/2023    BILIRUBINUR NEGATIVE 10/13/2015    BLOODU Moderate (A) 07/26/2023    BLOODU NEGATIVE 10/13/2015     Urine Culture:   Lab Results   Component Value Date    URINECX No Growth <100 cfu/mL 07/27/2023     PSA: No results found for: "PSA"        Assessment/ Plan:  Cystoscopy right ureteroscopy lithotripsy stent exchange      Lilli Day MD

## 2023-08-10 ENCOUNTER — HOSPITAL ENCOUNTER (OUTPATIENT)
Facility: HOSPITAL | Age: 33
Setting detail: OUTPATIENT SURGERY
Discharge: HOME | End: 2023-08-10
Attending: SPECIALIST | Admitting: SPECIALIST
Payer: COMMERCIAL

## 2023-08-10 ENCOUNTER — ANESTHESIA (OUTPATIENT)
Dept: PERIOP | Facility: HOSPITAL | Age: 33
End: 2023-08-10
Payer: COMMERCIAL

## 2023-08-10 ENCOUNTER — APPOINTMENT (OUTPATIENT)
Dept: RADIOLOGY | Facility: HOSPITAL | Age: 33
End: 2023-08-10
Payer: COMMERCIAL

## 2023-08-10 VITALS
HEART RATE: 80 BPM | WEIGHT: 192.9 LBS | RESPIRATION RATE: 18 BRPM | OXYGEN SATURATION: 99 % | BODY MASS INDEX: 26.16 KG/M2 | SYSTOLIC BLOOD PRESSURE: 145 MMHG | TEMPERATURE: 97.9 F | DIASTOLIC BLOOD PRESSURE: 78 MMHG

## 2023-08-10 DIAGNOSIS — N20.0 KIDNEY STONE: ICD-10-CM

## 2023-08-10 PROBLEM — F17.200 SMOKING: Status: ACTIVE | Noted: 2023-08-10

## 2023-08-10 PROBLEM — IMO0001 SMOKING: Status: ACTIVE | Noted: 2023-08-10

## 2023-08-10 PROBLEM — I63.9 CVA (CEREBRAL VASCULAR ACCIDENT) (HCC): Status: ACTIVE | Noted: 2023-08-10

## 2023-08-10 PROCEDURE — C1769 GUIDE WIRE: HCPCS | Performed by: SPECIALIST

## 2023-08-10 PROCEDURE — C2617 STENT, NON-COR, TEM W/O DEL: HCPCS | Performed by: SPECIALIST

## 2023-08-10 PROCEDURE — 74018 RADEX ABDOMEN 1 VIEW: CPT

## 2023-08-10 PROCEDURE — C1758 CATHETER, URETERAL: HCPCS | Performed by: SPECIALIST

## 2023-08-10 PROCEDURE — 82360 CALCULUS ASSAY QUANT: CPT | Performed by: SPECIALIST

## 2023-08-10 PROCEDURE — 88300 SURGICAL PATH GROSS: CPT | Performed by: STUDENT IN AN ORGANIZED HEALTH CARE EDUCATION/TRAINING PROGRAM

## 2023-08-10 DEVICE — INLAY URETERAL STENT W/O GUIDEWIRE
Type: IMPLANTABLE DEVICE | Site: URETER | Status: FUNCTIONAL
Brand: BARD® INLAY® URETERAL STENT

## 2023-08-10 RX ORDER — ONDANSETRON 2 MG/ML
4 INJECTION INTRAMUSCULAR; INTRAVENOUS ONCE AS NEEDED
Status: DISCONTINUED | OUTPATIENT
Start: 2023-08-10 | End: 2023-08-10 | Stop reason: HOSPADM

## 2023-08-10 RX ORDER — MIDAZOLAM HYDROCHLORIDE 2 MG/2ML
INJECTION, SOLUTION INTRAMUSCULAR; INTRAVENOUS AS NEEDED
Status: DISCONTINUED | OUTPATIENT
Start: 2023-08-10 | End: 2023-08-10

## 2023-08-10 RX ORDER — MAGNESIUM HYDROXIDE 1200 MG/15ML
LIQUID ORAL AS NEEDED
Status: DISCONTINUED | OUTPATIENT
Start: 2023-08-10 | End: 2023-08-10 | Stop reason: HOSPADM

## 2023-08-10 RX ORDER — LIDOCAINE HYDROCHLORIDE 10 MG/ML
INJECTION, SOLUTION EPIDURAL; INFILTRATION; INTRACAUDAL; PERINEURAL AS NEEDED
Status: DISCONTINUED | OUTPATIENT
Start: 2023-08-10 | End: 2023-08-10

## 2023-08-10 RX ORDER — CEFAZOLIN SODIUM 2 G/50ML
2000 SOLUTION INTRAVENOUS ONCE
Status: COMPLETED | OUTPATIENT
Start: 2023-08-10 | End: 2023-08-10

## 2023-08-10 RX ORDER — SODIUM CHLORIDE, SODIUM LACTATE, POTASSIUM CHLORIDE, CALCIUM CHLORIDE 600; 310; 30; 20 MG/100ML; MG/100ML; MG/100ML; MG/100ML
75 INJECTION, SOLUTION INTRAVENOUS CONTINUOUS
Status: DISCONTINUED | OUTPATIENT
Start: 2023-08-10 | End: 2023-08-10 | Stop reason: HOSPADM

## 2023-08-10 RX ORDER — FENTANYL CITRATE 50 UG/ML
INJECTION, SOLUTION INTRAMUSCULAR; INTRAVENOUS AS NEEDED
Status: DISCONTINUED | OUTPATIENT
Start: 2023-08-10 | End: 2023-08-10

## 2023-08-10 RX ORDER — PROPOFOL 10 MG/ML
INJECTION, EMULSION INTRAVENOUS AS NEEDED
Status: DISCONTINUED | OUTPATIENT
Start: 2023-08-10 | End: 2023-08-10

## 2023-08-10 RX ORDER — FENTANYL CITRATE/PF 50 MCG/ML
25 SYRINGE (ML) INJECTION
Status: DISCONTINUED | OUTPATIENT
Start: 2023-08-10 | End: 2023-08-10 | Stop reason: HOSPADM

## 2023-08-10 RX ORDER — DEXAMETHASONE SODIUM PHOSPHATE 10 MG/ML
INJECTION, SOLUTION INTRAMUSCULAR; INTRAVENOUS AS NEEDED
Status: DISCONTINUED | OUTPATIENT
Start: 2023-08-10 | End: 2023-08-10

## 2023-08-10 RX ORDER — ONDANSETRON 2 MG/ML
INJECTION INTRAMUSCULAR; INTRAVENOUS AS NEEDED
Status: DISCONTINUED | OUTPATIENT
Start: 2023-08-10 | End: 2023-08-10

## 2023-08-10 RX ORDER — EPHEDRINE SULFATE 50 MG/ML
INJECTION INTRAVENOUS AS NEEDED
Status: DISCONTINUED | OUTPATIENT
Start: 2023-08-10 | End: 2023-08-10

## 2023-08-10 RX ORDER — MEPERIDINE HYDROCHLORIDE 25 MG/ML
12.5 INJECTION INTRAMUSCULAR; INTRAVENOUS; SUBCUTANEOUS
Status: DISCONTINUED | OUTPATIENT
Start: 2023-08-10 | End: 2023-08-10 | Stop reason: HOSPADM

## 2023-08-10 RX ADMIN — EPHEDRINE SULFATE 10 MG: 50 INJECTION, SOLUTION INTRAVENOUS at 15:52

## 2023-08-10 RX ADMIN — SODIUM CHLORIDE, SODIUM LACTATE, POTASSIUM CHLORIDE, AND CALCIUM CHLORIDE: .6; .31; .03; .02 INJECTION, SOLUTION INTRAVENOUS at 15:32

## 2023-08-10 RX ADMIN — CEFAZOLIN SODIUM 2000 MG: 2 SOLUTION INTRAVENOUS at 15:36

## 2023-08-10 RX ADMIN — DEXAMETHASONE SODIUM PHOSPHATE 10 MG: 10 INJECTION, SOLUTION INTRAMUSCULAR; INTRAVENOUS at 15:45

## 2023-08-10 RX ADMIN — PROPOFOL 200 MG: 10 INJECTION, EMULSION INTRAVENOUS at 15:37

## 2023-08-10 RX ADMIN — LIDOCAINE HYDROCHLORIDE 50 MG: 10 INJECTION, SOLUTION EPIDURAL; INFILTRATION; INTRACAUDAL; PERINEURAL at 15:37

## 2023-08-10 RX ADMIN — MIDAZOLAM 2 MG: 1 INJECTION INTRAMUSCULAR; INTRAVENOUS at 15:33

## 2023-08-10 RX ADMIN — FENTANYL CITRATE 100 MCG: 50 INJECTION, SOLUTION INTRAMUSCULAR; INTRAVENOUS at 15:37

## 2023-08-10 RX ADMIN — EPHEDRINE SULFATE 10 MG: 50 INJECTION, SOLUTION INTRAVENOUS at 15:49

## 2023-08-10 RX ADMIN — ONDANSETRON 4 MG: 2 INJECTION INTRAMUSCULAR; INTRAVENOUS at 15:45

## 2023-08-10 RX ADMIN — EPHEDRINE SULFATE 10 MG: 50 INJECTION, SOLUTION INTRAVENOUS at 15:47

## 2023-08-10 NOTE — PERIOPERATIVE NURSING NOTE
Received patient to [facility] from PACU, alert, VSS.  OOB to bathroom upon arrival, voided large amount of pink-tinged urine without clots. Pt reports mild discomfort but no pain at this time. Tolerating po fluids.

## 2023-08-10 NOTE — PROGRESS NOTES
Progress Note - Urology      Patient: Génesis Portillo   : 1990 Sex: male   MRN: [id number]     CSN: [id number]  Unit/Bed#: OR POOL     SUBJECTIVE:   Patient in surgical preop unit  Changed history physical  Passed his 5-6 distal right stone  The office recently urinalysis no infection      Objective   Vitals: /71   Pulse 65   Temp 98.9 °F (37.2 °C) (Temporal)   Resp 16   Wt 87.5 kg (192 lb 14.4 oz)   SpO2 98%   BMI 26.16 kg/m²     No intake/output data recorded.       Physical Exam:   General Alert awake   Normocephalic atraumatic PERRLA  Lungs clear bilaterally  Cardiac normal S1 normal S2  Abdomen soft, flank pain  Extremities no edema      Lab Results: CBC:   Lab Results   Component Value Date    WBC 10.67 (H) 2023    HGB 12.6 2023    HCT 37.6 2023    MCV 89 2023     2023    RBC 4.23 2023    MCH 29.8 2023    MCHC 33.5 2023    RDW 13.0 2023    MPV 9.2 2023    NRBC 0 2023     CMP:   Lab Results   Component Value Date     10/15/2015     2023     10/15/2015    CO2 27 2023    CO2 26 2023    ANIONGAP 12.3 10/15/2015    BUN 16 2023    BUN 10 10/15/2015    CREATININE 1.09 2023    CREATININE 0.9 10/15/2015    GLUCOSE 88 2023    GLUCOSE 91 10/15/2015    CALCIUM 9.0 2023    CALCIUM 8.6 10/15/2015    AST 17 2023    AST 31 10/13/2015    ALT 14 2023    ALT 25 10/13/2015    ALKPHOS 76 2023    ALKPHOS 94 10/13/2015    PROT 7.5 10/13/2015    BILITOT 0.9 10/13/2015    EGFR 87 2023     Urinalysis:   Lab Results   Component Value Date    COLORU Yellow 2023    CLARITYU Clear 2023    CLARITYU JIGAR (A) 10/13/2015    CLARITYU CLEAR 10/13/2015    SPECGRAV 1.025 2023    SPECGRAV 1.015 10/13/2015    PHUR 6.0 2023    PHUR 8.0 10/13/2015    LEUKOCYTESUR Negative 2023    LEUKOCYTESUR NEGATIVE 10/13/2015    NITRITE Negative 2023 NITRITE NEGATIVE 10/13/2015    PROTEINUA TRACE 10/13/2015    GLUCOSEU Negative 07/26/2023    GLUCOSEU NEGATIVE 10/13/2015    KETONESU Negative 07/26/2023    KETONESU NEGATIVE 10/13/2015    BILIRUBINUR Negative 07/26/2023    BILIRUBINUR NEGATIVE 10/13/2015    BLOODU Moderate (A) 07/26/2023    BLOODU NEGATIVE 10/13/2015     Urine Culture:   Lab Results   Component Value Date    URINECX No Growth <100 cfu/mL 07/27/2023     PSA: No results found for: "PSA"      Assessment/ Plan:  Cystoscopy right ureteroscopy laser stent exchange          Morales Chaves MD

## 2023-08-10 NOTE — OP NOTE
OPERATIVE REPORT  PATIENT NAME: Pee Paz    :  1990  MRN: [id number]  Pt Location: [facility] OR ROOM 04    SURGERY DATE: 8/10/2023    Surgeon(s) and Role:     * Milton Schwartz MD - Primary    Preop Diagnosis:  Kidney stone [N20.0]  6 mm right distal ureterovesical junction stone    Post-Op Diagnosis Codes:     * Kidney stone [N20.0]    Procedure(s):  Right - CYSTOSCOPY. URETEROSCOPY WITH LITHOTRIPSY HOLMIUM LASER. STONE BASKET EXTRACTION.  STENT URETERAL    Specimen(s):  ID Type Source Tests Collected by Time Destination   1 :  Calculus Ureter, Right STONE ANALYSIS, TISSUE EXAM Milton Schwartz MD 8/10/2023 1601        Estimated Blood Loss:   Minimal    Drains:  * No LDAs found *    Anesthesia Type:   General/LMA    Operative Indications:  Kidney stone [N20.0]  This 49-year-old male admitted with complicated UTI fevers chills and a 5 mm right distal ureterovesical junction stone 2 weeks ago underwent immediate cystoscopy stent right ureteroscopy stone manipulation placement sent home on appropriate antibiotics seen in the office earlier this week found to have a normal urine analysis now to go to the OR for ureteroscopy lithotripsy stent exchange aware risk of anesthesia infection bleeding additional urologic procedures    Operative Findings:  6 mm right distal ureterovesical junction stone requiring laser lithotripsy basket extraction stone analysis pending stent removed in a few days    Complications:   None    Procedure and Technique:  After patient identified in the holding area right side marked consent signed placed napsylate after anesthesia induced placed in lithotomy position draped and prepped in standard fashion timeout performed 22 English cystoscope passed urethra into the bladder anterior tissue abnormalities post urethra confirmed a 2.0 cm bilobar prostatic urethra scope inserted to the bladder lumen right stent was noted to be in normal position 8.038 wire was passed upward by the stent into the right renal pelvis the stent was removed and alligator. The semirigid ureteroscope was then passed stone was easily encountered holmium YAG laser was placed and the stone was broken into multiple 2 mm fragments removed with a basket in light of the moderate edema in the mucosa a 24 cm 6 Pashto stent was passed wire removed scope removed patient have [place] awakened to recovery room in stable condition   I was present for the entire procedure.     Patient Disposition:  PACU         SIGNATURE: Maru Washington MD  DATE: August 10, 2023  TIME: 4:13 PM

## 2023-08-10 NOTE — PERIOPERATIVE NURSING NOTE
Criteria met for discharge. IV removed. Discharge instructions discussed with patient, all questions answered.

## 2023-08-10 NOTE — DISCHARGE INSTR - AVS FIRST PAGE
#1 no heavy straining or lifting above 10 pounds for 2 weeks    #2 call office fevers, chills, or worsening blood in the urine. #3 patient to call office to schedule cystoscopy stent removal Stone analysis pending      Claudeen Prince. Levi RYAN [address] [place] [address] [place] office  [address] [phone]  8:30 AM to 4:30 PM  Monday through Friday    [facility] office  [phone] [facility]), [address] [place] [address]  [phone]  1:00 to 5:00 PM  Wednesday

## 2023-08-10 NOTE — ANESTHESIA POSTPROCEDURE EVALUATION
Post-Op Assessment Note    CV Status:  Stable  Pain Score: 0    Pain management: adequate     Mental Status:  Sleepy   Hydration Status:  Stable   PONV Controlled:  None   Airway Patency:  Patent      Post Op Vitals Reviewed: Yes      Staff: Anesthesiologist         No notable events documented.     BP  139/77    Temp   97.9   Pulse  95   Resp  20   SpO2  100%

## 2023-08-14 PROCEDURE — 88300 SURGICAL PATH GROSS: CPT | Performed by: STUDENT IN AN ORGANIZED HEALTH CARE EDUCATION/TRAINING PROGRAM

## 2023-08-18 LAB
CALCIUM OXALATE DIHYDRATE MFR STONE IR: 90 %
COLOR STONE: NORMAL
COM MFR STONE: 10 %
COMMENT-STONE3: NORMAL
COMPOSITION: NORMAL
LABORATORY COMMENT REPORT: NORMAL
PHOTO: NORMAL
SIZE STONE: NORMAL MM
SPEC SOURCE SUBJ: NORMAL
STONE ANALYSIS-IMP: NORMAL
STONE ANALYSIS-IMP: NORMAL
WT STONE: 20 MG

## 2023-09-24 PROBLEM — N39.0 UTI (URINARY TRACT INFECTION): Status: RESOLVED | Noted: 2023-07-26 | Resolved: 2023-09-24

## 2023-10-24 ENCOUNTER — HOSPITAL ENCOUNTER (OUTPATIENT)
Dept: RADIOLOGY | Facility: HOSPITAL | Age: 33
Discharge: HOME | End: 2023-10-24
Attending: SPECIALIST
Payer: COMMERCIAL

## 2023-10-24 DIAGNOSIS — N20.0 KIDNEY STONES: ICD-10-CM

## 2023-10-24 PROCEDURE — 76770 US EXAM ABDO BACK WALL COMP: CPT

## 2023-11-01 ENCOUNTER — APPOINTMENT (EMERGENCY)
Dept: RADIOLOGY | Facility: HOSPITAL | Age: 33
End: 2023-11-01
Payer: COMMERCIAL

## 2023-11-01 ENCOUNTER — HOSPITAL ENCOUNTER (EMERGENCY)
Facility: HOSPITAL | Age: 33
Discharge: HOME | End: 2023-11-01
Attending: EMERGENCY MEDICINE
Payer: COMMERCIAL

## 2023-11-01 VITALS
TEMPERATURE: 98.8 F | DIASTOLIC BLOOD PRESSURE: 68 MMHG | OXYGEN SATURATION: 96 % | HEART RATE: 73 BPM | SYSTOLIC BLOOD PRESSURE: 130 MMHG | RESPIRATION RATE: 18 BRPM

## 2023-11-01 DIAGNOSIS — L03.211 CELLULITIS OF FACE: Primary | ICD-10-CM

## 2023-11-01 PROCEDURE — G1004 CDSM NDSC: HCPCS

## 2023-11-01 PROCEDURE — 99283 EMERGENCY DEPT VISIT LOW MDM: CPT

## 2023-11-01 PROCEDURE — 70450 CT HEAD/BRAIN W/O DYE: CPT

## 2023-11-01 PROCEDURE — 99284 EMERGENCY DEPT VISIT MOD MDM: CPT | Performed by: EMERGENCY MEDICINE

## 2023-11-01 RX ORDER — CEPHALEXIN 500 MG/1
500 CAPSULE ORAL EVERY 6 HOURS SCHEDULED
Qty: 28 CAPSULE | Refills: 0 | Status: SHIPPED | OUTPATIENT
Start: 2023-11-01 | End: 2023-11-08

## 2023-11-01 RX ORDER — CEPHALEXIN 500 MG/1
500 CAPSULE ORAL ONCE
Status: COMPLETED | OUTPATIENT
Start: 2023-11-01 | End: 2023-11-01

## 2023-11-01 RX ADMIN — CEPHALEXIN 500 MG: 500 CAPSULE ORAL at 15:32

## 2023-11-01 NOTE — ED PROVIDER NOTES
History  Chief Complaint   Patient presents with    Rash     Patient had a pimple on nose and popped in last week. Patient noticed nose getting red/rash like on Monday. 68-year-old male presents to the ED stating that he had a "pimple" on the inside of his right nares. States after day or so it did come to ahead and was white he was able to pop it and it drained some and then he squeezed it some. Now he is complaining of some increased pain and redness in the area. Also has headache. No fevers chills no other systemic symptoms. Patient is awake and alert        Prior to Admission Medications   Prescriptions Last Dose Informant Patient Reported? Taking? QUEtiapine (SEROquel) 50 mg tablet   No No   Sig: Take 1 tablet (50 mg total) by mouth daily at bedtime as needed (Sleep)   Patient taking differently: Take 50 mg by mouth daily at bedtime   SERTRALINE HCL PO   Yes No   Sig: Take 150 mg by mouth every morning   buPROPion (WELLBUTRIN XL) 150 mg 24 hr tablet   No No   Sig: Take 1 tablet (150 mg total) by mouth daily Do not start before July 29, 2023.    Patient taking differently: Take 150 mg by mouth every morning   disulfiram (ANTABUSE) 250 mg tablet   Yes No   Sig: Take 250 mg by mouth every morning   gabapentin (NEURONTIN) 600 MG tablet   Yes No   Sig: Take 600 mg by mouth 3 (three) times a day      Facility-Administered Medications: None       Past Medical History:   Diagnosis Date    Anxiety     Depression     ETOH abuse     40 days sober as of 8/10/23    Hematuria     Kidney stone        Past Surgical History:   Procedure Laterality Date    FINGER SURGERY  2008    infection of the finger    FRACTURE SURGERY Left     lower arm fx-plate    MANDIBLE FRACTURE SURGERY      screws    [place] CYSTO/URETERO W/LITHOTRIPSY &INDWELL STENT INSRT Right 07/27/2023    Procedure: CYSTOSCOPY RIGHT URETEROSCOPY, STONE MANIPULATION, INSERTION STENT URETERAL;  Surgeon: Kandice Anna MD;  Location: [facility];  Service: Urology    [place] CYSTO/URETERO W/LITHOTRIPSY &INDWELL STENT INSRT Right 8/10/2023    Procedure: CYSTOSCOPY, URETEROSCOPY WITH LITHOTRIPSY HOLMIUM LASER, STONE BASKET EXTRACTION, STENT URETERAL;  Surgeon: Nurys Jorgensen MD;  Location: [facility];  Service: Urology       Family History   Problem Relation Age of Onset    Depression Mother     Heart disease Father         MI    Depression Father      I have reviewed and agree with the history as documented. E-Cigarette/Vaping    E-Cigarette Use Never User      E-Cigarette/Vaping Substances    Nicotine No     THC No     CBD No     Flavoring No     Other No     Unknown No      Social History     Tobacco Use    Smoking status: Every Day     Packs/day: 0.50     Types: Cigarettes    Smokeless tobacco: Never   Vaping Use    Vaping Use: Never used   Substance Use Topics    Alcohol use: Yes     Alcohol/week: 6.0 standard drinks of alcohol     Types: 2 Glasses of wine, 4 Cans of beer per week     Comment: 2 pints of vodka or more with binge drinking -in recovery 40 days sober    Drug use: Not Currently     Types: Marijuana, Cocaine     Comment: early 20's- none since 2018       Review of Systems   Constitutional:  Negative for activity change, chills, diaphoresis and fever. HENT:  Negative for congestion, ear pain, nosebleeds, sore throat, trouble swallowing and voice change. Redness and swelling in the right nares consistent with pimple inside the nares. Eyes:  Negative for pain, discharge and redness. Respiratory:  Negative for apnea, cough, choking, shortness of breath, wheezing and stridor. Cardiovascular:  Negative for chest pain and palpitations. Gastrointestinal:  Negative for abdominal distention, abdominal pain, constipation, diarrhea, nausea and vomiting. Endocrine: Negative for polydipsia. Genitourinary:  Negative for difficulty urinating, dysuria, flank pain, frequency, hematuria and urgency.    Musculoskeletal:  Negative for back pain, gait problem, joint swelling, myalgias, neck pain and neck stiffness. Skin:  Negative for pallor and rash. Neurological:  Negative for dizziness, tremors, syncope, speech difficulty, weakness, numbness and headaches. Hematological:  Negative for adenopathy. Psychiatric/Behavioral:  Negative for confusion, hallucinations, self-injury and suicidal ideas. The patient is not nervous/anxious. Physical Exam  Physical Exam  Vitals and nursing note reviewed. Constitutional:       General: He is not in acute distress. Appearance: He is well-developed. He is not diaphoretic. HENT:      Head: Normocephalic and atraumatic. Right Ear: External ear normal.      Left Ear: External ear normal.      Nose:      Comments: Redness and slight swelling inside the right nares causing some redness to the external nose on the right side. Eyes:      Conjunctiva/sclera: Conjunctivae normal.      Pupils: Pupils are equal, round, and reactive to light. Cardiovascular:      Rate and Rhythm: Normal rate and regular rhythm. Heart sounds: Normal heart sounds. Pulmonary:      Effort: Pulmonary effort is normal.      Breath sounds: Normal breath sounds. Abdominal:      General: Bowel sounds are normal.      Palpations: Abdomen is soft. Tenderness: There is abdominal tenderness. Comments: Diffuse tenderness   Musculoskeletal:         General: Normal range of motion. Cervical back: Normal range of motion and neck supple. Skin:     General: Skin is warm and dry. Neurological:      Mental Status: He is alert and oriented to person, place, and time. Deep Tendon Reflexes: Reflexes are normal and symmetric.          Vital Signs  ED Triage Vitals [11/01/23 1501]   Temperature Pulse Respirations Blood Pressure SpO2   98.8 °F (37.1 °C) 73 18 130/68 96 %      Temp Source Heart Rate Source Patient Position - Orthostatic VS BP Location FiO2 (%)   Oral Monitor Lying Right arm --      Pain Score       -- Vitals:    11/01/23 1501   BP: 130/68   Pulse: 73   Patient Position - Orthostatic VS: Lying         Visual Acuity      ED Medications  Medications   cephalexin (KEFLEX) capsule 500 mg (500 mg Oral Given 11/1/23 1532)       Diagnostic Studies  Results Reviewed       None                   CT head without contrast   Final Result by Nela Drake MD (11/01 1601)      No acute intracranial hemorrhage, mass effect or edema. Workstation performed: [id number]                    Procedures  Procedures         ED Course                                             Medical Decision Making  Amount and/or Complexity of Data Reviewed  Radiology: ordered. Risk  Prescription drug management. Disposition  Final diagnoses:   Cellulitis of face     Time reflects when diagnosis was documented in both MDM as applicable and the Disposition within this note       Time User Action Codes Description Comment    11/1/2023  4:32 PM My Flanagan Add [L68.128] Cellulitis of face           ED Disposition       ED Disposition   Discharge    Condition   Stable    Date/Time   Wed Nov 1, 2023  4:32 PM    [address] discharge to home/self care. Follow-up Information       Follow up With Specialties Details Why Kelly Whitt MD   As needed [address] [place] [address]  [phone]              Patient's Medications   Discharge Prescriptions    CEPHALEXIN (KEFLEX) 500 MG CAPSULE    Take 1 capsule (500 mg total) by mouth every 6 (six) hours for 7 days       Start Date: 11/1/2023 End Date: 11/8/2023       Order Dose: 500 mg       Quantity: 28 capsule    Refills: 0    MUPIROCIN (BACTROBAN) 2 % OINTMENT    Apply topically 3 (three) times a day       Start Date: 11/1/2023 End Date: --       Order Dose: --       Quantity: 15 g    Refills: 0       No discharge procedures on file.     PDMP Review       None            ED Provider  Electronically Signed by             Suzette Alford, DO  11/01/23 0653

## 2023-11-01 NOTE — Clinical Note
Ani Arita was seen and treated in our emergency department on 11/1/2023. Diagnosis:     Mert Braswell  may return to work on return date. He may return on this date: 11/03/2023         If you have any questions or concerns, please don't hesitate to call.       Deb Bradshaw, DO    ______________________________           _______________          _______________  Hospital Representative                              Date                                Time

## 2024-06-13 NOTE — CASE MANAGEMENT
Case Management Progress Note    Patient name Luis M Blunt  Location ICU 15/ICU 15 MRN [id number]  : 1990 Date 6/15/2023       LOS (days): 1  Geometric Mean LOS (GMLOS) (days):   Days to GMLOS:        OBJECTIVE:        Current admission status: Inpatient  Preferred Pharmacy:   PATIENT/FAMILY REPORTS NO PREFERRED PHARMACY  No address on file      Primary Care Provider: Matilda Spencer MD    Primary Insurance: [address] [place] [address]  Secondary Insurance:     PROGRESS NOTE:    No beds available in network, patient on waiting list for [facility] admission in network  Patient denied for medical acuity @ [facility]  Patient referral pending review at 20 other IPUs at this time  DISPLAY PLAN FREE TEXT

## 2025-04-14 ENCOUNTER — HOSPITAL ENCOUNTER (OUTPATIENT)
Dept: RADIOLOGY | Facility: HOSPITAL | Age: 35
Discharge: HOME | End: 2025-04-14

## 2025-04-14 DIAGNOSIS — Z02.1 PRE-EMPLOYMENT HEALTH SCREENING EXAMINATION: ICD-10-CM

## 2025-04-14 PROCEDURE — 72100 X-RAY EXAM L-S SPINE 2/3 VWS: CPT

## 2025-05-31 ENCOUNTER — HOSPITAL ENCOUNTER (EMERGENCY)
Facility: HOSPITAL | Age: 35
End: 2025-05-31
Attending: EMERGENCY MEDICINE | Admitting: EMERGENCY MEDICINE
Payer: COMMERCIAL

## 2025-05-31 ENCOUNTER — HOSPITAL ENCOUNTER (INPATIENT)
Facility: HOSPITAL | Age: 35
LOS: 2 days | Discharge: HOME | End: 2025-06-02
Attending: INTERNAL MEDICINE | Admitting: INTERNAL MEDICINE
Payer: COMMERCIAL

## 2025-05-31 VITALS
HEART RATE: 88 BPM | TEMPERATURE: 98.1 F | OXYGEN SATURATION: 94 % | DIASTOLIC BLOOD PRESSURE: 65 MMHG | RESPIRATION RATE: 20 BRPM | SYSTOLIC BLOOD PRESSURE: 101 MMHG

## 2025-05-31 DIAGNOSIS — F10.10 ALCOHOL ABUSE: Primary | ICD-10-CM

## 2025-05-31 DIAGNOSIS — F10.20 ALCOHOL USE DISORDER, SEVERE, DEPENDENCE (HCC): ICD-10-CM

## 2025-05-31 DIAGNOSIS — F41.9 ANXIETY AND DEPRESSION: ICD-10-CM

## 2025-05-31 DIAGNOSIS — Z72.0 TOBACCO ABUSE: Chronic | ICD-10-CM

## 2025-05-31 DIAGNOSIS — F32.A ANXIETY AND DEPRESSION: ICD-10-CM

## 2025-05-31 DIAGNOSIS — F10.939 ALCOHOL WITHDRAWAL SYNDROME WITH COMPLICATION (HCC): Primary | ICD-10-CM

## 2025-05-31 PROBLEM — T14.91XA SUICIDE ATTEMPT (HCC): Status: RESOLVED | Noted: 2023-06-13 | Resolved: 2025-05-31

## 2025-05-31 PROBLEM — N20.0 NEPHROLITHIASIS: Status: RESOLVED | Noted: 2023-07-26 | Resolved: 2025-05-31

## 2025-05-31 PROBLEM — E86.0 DEHYDRATION: Status: ACTIVE | Noted: 2025-05-31

## 2025-05-31 PROBLEM — T78.40XA ALLERGIC REACTION: Status: RESOLVED | Noted: 2023-06-14 | Resolved: 2025-05-31

## 2025-05-31 PROBLEM — I63.9 CVA (CEREBRAL VASCULAR ACCIDENT) (HCC): Status: RESOLVED | Noted: 2023-08-10 | Resolved: 2025-05-31

## 2025-05-31 PROBLEM — T07.XXXA ABRASIONS OF MULTIPLE SITES: Status: RESOLVED | Noted: 2023-06-13 | Resolved: 2025-05-31

## 2025-05-31 LAB
ALBUMIN SERPL BCG-MCNC: 4.5 G/DL (ref 3.5–5)
ALP SERPL-CCNC: 86 U/L (ref 34–104)
ALT SERPL W P-5'-P-CCNC: 19 U/L (ref 7–52)
AMPHETAMINES SERPL QL SCN: NEGATIVE
ANION GAP SERPL CALCULATED.3IONS-SCNC: 12 MMOL/L (ref 4–13)
AST SERPL W P-5'-P-CCNC: 19 U/L (ref 13–39)
BACTERIA UR QL AUTO: ABNORMAL /HPF
BARBITURATES UR QL: NEGATIVE
BASOPHILS # BLD AUTO: 0.04 THOUSANDS/ÂΜL (ref 0–0.1)
BASOPHILS NFR BLD AUTO: 0 % (ref 0–1)
BENZODIAZ UR QL: NEGATIVE
BILIRUB SERPL-MCNC: 0.5 MG/DL (ref 0.2–1)
BILIRUB UR QL STRIP: NEGATIVE
BUN SERPL-MCNC: 17 MG/DL (ref 5–25)
CALCIUM SERPL-MCNC: 9 MG/DL (ref 8.4–10.2)
CHLORIDE SERPL-SCNC: 105 MMOL/L (ref 96–108)
CLARITY UR: CLEAR
CO2 SERPL-SCNC: 23 MMOL/L (ref 21–32)
COCAINE UR QL: NEGATIVE
COLOR UR: YELLOW
CREAT SERPL-MCNC: 1.16 MG/DL (ref 0.6–1.3)
EOSINOPHIL # BLD AUTO: 0.08 THOUSAND/ÂΜL (ref 0–0.61)
EOSINOPHIL NFR BLD AUTO: 1 % (ref 0–6)
ERYTHROCYTE [DISTWIDTH] IN BLOOD BY AUTOMATED COUNT: 12.9 % (ref 11.6–15.1)
ETHANOL SERPL-MCNC: 247 MG/DL
FENTANYL UR QL SCN: NEGATIVE
GFR SERPL CREATININE-BSD FRML MDRD: 80 ML/MIN/1.73SQ M
GLUCOSE SERPL-MCNC: 123 MG/DL (ref 65–140)
GLUCOSE UR STRIP-MCNC: NEGATIVE MG/DL
HCT VFR BLD AUTO: 44.7 % (ref 36.5–49.3)
HGB BLD-MCNC: 15.7 G/DL (ref 12–17)
HGB UR QL STRIP.AUTO: NEGATIVE
HYDROCODONE UR QL SCN: NEGATIVE
IMM GRANULOCYTES # BLD AUTO: 0.03 THOUSAND/UL (ref 0–0.2)
IMM GRANULOCYTES NFR BLD AUTO: 0 % (ref 0–2)
KETONES UR STRIP-MCNC: ABNORMAL MG/DL
LEUKOCYTE ESTERASE UR QL STRIP: NEGATIVE
LYMPHOCYTES # BLD AUTO: 2.06 THOUSANDS/ÂΜL (ref 0.6–4.47)
LYMPHOCYTES NFR BLD AUTO: 19 % (ref 14–44)
MCH RBC QN AUTO: 30 PG (ref 26.8–34.3)
MCHC RBC AUTO-ENTMCNC: 35.1 G/DL (ref 31.4–37.4)
MCV RBC AUTO: 85 FL (ref 82–98)
METHADONE UR QL: NEGATIVE
MONOCYTES # BLD AUTO: 0.65 THOUSAND/ÂΜL (ref 0.17–1.22)
MONOCYTES NFR BLD AUTO: 6 % (ref 4–12)
NEUTROPHILS # BLD AUTO: 8.24 THOUSANDS/ÂΜL (ref 1.85–7.62)
NEUTS SEG NFR BLD AUTO: 74 % (ref 43–75)
NITRITE UR QL STRIP: NEGATIVE
NON-SQ EPI CELLS URNS QL MICRO: ABNORMAL /HPF
NRBC BLD AUTO-RTO: 0 /100 WBCS
OPIATES UR QL SCN: NEGATIVE
OXYCODONE+OXYMORPHONE UR QL SCN: NEGATIVE
PCP UR QL: NEGATIVE
PH UR STRIP.AUTO: 5.5 [PH]
PLATELET # BLD AUTO: 253 THOUSANDS/UL (ref 149–390)
PMV BLD AUTO: 9.2 FL (ref 8.9–12.7)
POTASSIUM SERPL-SCNC: 3.6 MMOL/L (ref 3.5–5.3)
PROT SERPL-MCNC: 7.5 G/DL (ref 6.4–8.4)
PROT UR STRIP-MCNC: NEGATIVE MG/DL
RBC # BLD AUTO: 5.24 MILLION/UL (ref 3.88–5.62)
RBC #/AREA URNS AUTO: ABNORMAL /HPF
SODIUM SERPL-SCNC: 140 MMOL/L (ref 135–147)
SP GR UR STRIP.AUTO: 1.01 (ref 1–1.03)
THC UR QL: NEGATIVE
UROBILINOGEN UR STRIP-ACNC: <2 MG/DL
WBC # BLD AUTO: 11.1 THOUSAND/UL (ref 4.31–10.16)
WBC #/AREA URNS AUTO: ABNORMAL /HPF

## 2025-05-31 PROCEDURE — 99285 EMERGENCY DEPT VISIT HI MDM: CPT | Performed by: EMERGENCY MEDICINE

## 2025-05-31 PROCEDURE — 85025 COMPLETE CBC W/AUTO DIFF WBC: CPT | Performed by: EMERGENCY MEDICINE

## 2025-05-31 PROCEDURE — 99284 EMERGENCY DEPT VISIT MOD MDM: CPT

## 2025-05-31 PROCEDURE — 36415 COLL VENOUS BLD VENIPUNCTURE: CPT | Performed by: EMERGENCY MEDICINE

## 2025-05-31 PROCEDURE — 80307 DRUG TEST PRSMV CHEM ANLYZR: CPT | Performed by: EMERGENCY MEDICINE

## 2025-05-31 PROCEDURE — 81001 URINALYSIS AUTO W/SCOPE: CPT | Performed by: EMERGENCY MEDICINE

## 2025-05-31 PROCEDURE — 99223 1ST HOSP IP/OBS HIGH 75: CPT | Performed by: PHYSICIAN ASSISTANT

## 2025-05-31 PROCEDURE — 80053 COMPREHEN METABOLIC PANEL: CPT | Performed by: EMERGENCY MEDICINE

## 2025-05-31 PROCEDURE — 82077 ASSAY SPEC XCP UR&BREATH IA: CPT | Performed by: EMERGENCY MEDICINE

## 2025-05-31 PROCEDURE — 96360 HYDRATION IV INFUSION INIT: CPT

## 2025-05-31 RX ORDER — BUPROPION HYDROCHLORIDE 300 MG/1
300 TABLET ORAL EVERY MORNING
Status: DISCONTINUED | OUTPATIENT
Start: 2025-06-01 | End: 2025-05-31

## 2025-05-31 RX ORDER — ENOXAPARIN SODIUM 100 MG/ML
40 INJECTION SUBCUTANEOUS DAILY
Status: DISCONTINUED | OUTPATIENT
Start: 2025-06-01 | End: 2025-05-31

## 2025-05-31 RX ORDER — DIAZEPAM 10 MG/2ML
10 INJECTION, SOLUTION INTRAMUSCULAR; INTRAVENOUS ONCE
Status: COMPLETED | OUTPATIENT
Start: 2025-05-31 | End: 2025-05-31

## 2025-05-31 RX ORDER — LAMOTRIGINE 25 MG/1
50 TABLET ORAL DAILY
Status: ON HOLD | COMMUNITY
End: 2025-06-02

## 2025-05-31 RX ORDER — CHLORDIAZEPOXIDE HYDROCHLORIDE 25 MG/1
50 CAPSULE, GELATIN COATED ORAL ONCE
Status: COMPLETED | OUTPATIENT
Start: 2025-05-31 | End: 2025-05-31

## 2025-05-31 RX ORDER — ESCITALOPRAM OXALATE 20 MG/1
20 TABLET ORAL DAILY
Status: ON HOLD | COMMUNITY
End: 2025-06-02

## 2025-05-31 RX ORDER — QUETIAPINE FUMARATE 25 MG/1
50 TABLET, FILM COATED ORAL ONCE
Status: COMPLETED | OUTPATIENT
Start: 2025-05-31 | End: 2025-05-31

## 2025-05-31 RX ORDER — SODIUM CHLORIDE 9 MG/ML
125 INJECTION, SOLUTION INTRAVENOUS CONTINUOUS
Status: DISCONTINUED | OUTPATIENT
Start: 2025-05-31 | End: 2025-05-31

## 2025-05-31 RX ORDER — ACETAMINOPHEN 325 MG/1
650 TABLET ORAL EVERY 6 HOURS PRN
Status: DISCONTINUED | OUTPATIENT
Start: 2025-05-31 | End: 2025-06-02 | Stop reason: HOSPADM

## 2025-05-31 RX ORDER — NICOTINE 21 MG/24HR
21 PATCH, TRANSDERMAL 24 HOURS TRANSDERMAL DAILY
Status: DISCONTINUED | OUTPATIENT
Start: 2025-05-31 | End: 2025-06-02 | Stop reason: HOSPADM

## 2025-05-31 RX ORDER — LAMOTRIGINE 25 MG/1
50 TABLET ORAL DAILY
Status: DISCONTINUED | OUTPATIENT
Start: 2025-06-01 | End: 2025-06-02 | Stop reason: HOSPADM

## 2025-05-31 RX ORDER — GABAPENTIN 300 MG/1
600 CAPSULE ORAL 3 TIMES DAILY
Status: DISCONTINUED | OUTPATIENT
Start: 2025-05-31 | End: 2025-06-02 | Stop reason: HOSPADM

## 2025-05-31 RX ORDER — ESCITALOPRAM OXALATE 10 MG/1
20 TABLET ORAL DAILY
Status: DISCONTINUED | OUTPATIENT
Start: 2025-06-01 | End: 2025-06-02 | Stop reason: HOSPADM

## 2025-05-31 RX ORDER — PHENOBARBITAL SODIUM 65 MG/ML
65 INJECTION, SOLUTION INTRAMUSCULAR; INTRAVENOUS ONCE
Status: COMPLETED | OUTPATIENT
Start: 2025-05-31 | End: 2025-05-31

## 2025-05-31 RX ORDER — ONDANSETRON 2 MG/ML
4 INJECTION INTRAMUSCULAR; INTRAVENOUS EVERY 6 HOURS PRN
Status: DISCONTINUED | OUTPATIENT
Start: 2025-05-31 | End: 2025-06-02 | Stop reason: HOSPADM

## 2025-05-31 RX ORDER — ESCITALOPRAM OXALATE 10 MG/1
5 TABLET ORAL DAILY
Status: DISCONTINUED | OUTPATIENT
Start: 2025-05-31 | End: 2025-05-31

## 2025-05-31 RX ORDER — SODIUM CHLORIDE 9 MG/ML
125 INJECTION, SOLUTION INTRAVENOUS CONTINUOUS
Status: DISCONTINUED | OUTPATIENT
Start: 2025-05-31 | End: 2025-06-01

## 2025-05-31 RX ORDER — QUETIAPINE FUMARATE 50 MG/1
50 TABLET, FILM COATED ORAL
Status: DISCONTINUED | OUTPATIENT
Start: 2025-05-31 | End: 2025-05-31

## 2025-05-31 RX ADMIN — SODIUM CHLORIDE 1000 ML: 0.9 INJECTION, SOLUTION INTRAVENOUS at 14:32

## 2025-05-31 RX ADMIN — PHENOBARBITAL SODIUM 65 MG: 65 INJECTION INTRAMUSCULAR; INTRAVENOUS at 22:00

## 2025-05-31 RX ADMIN — ONDANSETRON 4 MG: 2 INJECTION INTRAMUSCULAR; INTRAVENOUS at 22:37

## 2025-05-31 RX ADMIN — GABAPENTIN 600 MG: 300 CAPSULE ORAL at 22:00

## 2025-05-31 RX ADMIN — NICOTINE 21 MG: 21 PATCH, EXTENDED RELEASE TRANSDERMAL at 22:01

## 2025-05-31 RX ADMIN — QUETIAPINE FUMARATE 50 MG: 25 TABLET ORAL at 14:35

## 2025-05-31 RX ADMIN — CHLORDIAZEPOXIDE HYDROCHLORIDE 50 MG: 25 CAPSULE ORAL at 13:59

## 2025-05-31 RX ADMIN — DIAZEPAM 10 MG: 5 INJECTION, SOLUTION INTRAMUSCULAR; INTRAVENOUS at 22:00

## 2025-05-31 RX ADMIN — SODIUM CHLORIDE 125 ML/HR: 0.9 INJECTION, SOLUTION INTRAVENOUS at 22:30

## 2025-05-31 RX ADMIN — SODIUM CHLORIDE 125 ML/HR: 0.9 INJECTION, SOLUTION INTRAVENOUS at 22:03

## 2025-05-31 NOTE — ED NOTES
Warm hand off was canceled.  Patient has been accepted to [facility] and will be going today at 8 pm

## 2025-05-31 NOTE — ASSESSMENT & PLAN NOTE
Patient has home medication regimen of Wellbutrin 150 mg,  Zoloft 150 mg, Seroquel 50 mg, Gabapentin 300 mg TID  Will hold Wellbutrin in the setting of alcohol withdrawal as it lowers seizure threshold and patient has hx of withdrawal seizures   Continue other psychiatric medication

## 2025-05-31 NOTE — ASSESSMENT & PLAN NOTE
Last drink: 5/31/25   Ethanol lvl: 247   Received Librium in ED  Toxicology consulted; appreciate recommendations   Follow SEWS protocol with symptom-triggered phenobarbital for medically-assisted alcohol withdrawal  Current symptoms include:   SEWS score upon admission:   Administer ? mg phenobarbital + valium 10 mg IV   Telemetry and pulse oximetry monitoring   Continue to monitor vitals, symptoms

## 2025-05-31 NOTE — ASSESSMENT & PLAN NOTE
Pt with a h/o of daily alcohol use   Drinks a quart of vodka daily  Reports previous history of withdrawal complicated by seizures   Previously on antabuse  Withdrawal management as above  Initiate IVFs, IV thiamine, folic acid, and MVI  Consult to CRS   Consult case management/CRS for assistance with aftercare resources - pt interested in inpatient drug and alcohol rehab upon discharge at this time

## 2025-05-31 NOTE — ED PROVIDER NOTES
Time reflects when diagnosis was documented in both MDM as applicable and the Disposition within this note       Time User Action Codes Description Comment    5/31/2025  2:16 PM Jose Ching Add [F10.929] Alcohol intoxication (HCC)     5/31/2025  2:16 PM Jose Ching Remove [F10.929] Alcohol intoxication (HCC)     5/31/2025  2:16 PM Jose Ching Add [F10.10] Alcohol abuse           ED Disposition       ED Disposition   Transfer to Another Facility-In Network    Condition   --    Date/Time   Sat May 31, 2025  2:17 PM    Comment   Bon Hazel should be transferred out to [facility].               Assessment & Plan       Medical Decision Making  I ordered and reviewed lab work including CBC, CMP, ethanol, rapid drug screen, urinalysis. Patient with an ethanol of 247 but acting clinically sober.  Patient agreeable with transfer to [facility].  I discussed patient with the detox team who reviewed the case and accepted patient.  Patient treated with Librium.  Patient remaining stable through transfer.    Amount and/or Complexity of Data Reviewed  Labs: ordered.    Risk  Prescription drug management.             Medications   chlordiazePOXIDE (LIBRIUM) capsule 50 mg (50 mg Oral Given 5/31/25 1359)   sodium chloride 0.9 % bolus 1,000 mL (0 mL Intravenous Stopped 5/31/25 1555)   QUEtiapine (SEROquel) tablet 50 mg (50 mg Oral Given 5/31/25 1435)       ED Risk Strat Scores                 CIWA-Ar Score       Row Name 05/31/25 1807 05/31/25 1425          CIWA-Ar    Nausea and Vomiting 0 1     Tactile Disturbances 0 3     Tremor 0 0     Auditory Disturbances 0 0     Paroxysmal Sweats 0 0     Visual Disturbances 0 0     Anxiety 3 4     Headache, Fullness in Head 2 2     Agitation 0 0     Orientation and Clouding of Sensorium 0 1     CIWA-Ar Total 5 11                     No data recorded        SBIRT 22yo+      Flowsheet Row Most Recent Value   Initial Alcohol Screen: US AUDIT-C     1. How often do you have a  drink containing alcohol? 6 Filed at: 05/31/2025 1306   3a. Male UNDER 65: How often do you have five or more drinks on one occasion? --  [unwilling to answer] Filed at: 05/31/2025 1302   Audit-C Score 6 Filed at: 05/31/2025 1304                            History of Present Illness       Chief Complaint   Patient presents with    Alcohol Intoxication     Patient states feeling in general not happy with himself. Called police, willing to go to alcohol rehab. Had a liter of vodka in 16 hours.       Past Medical History[1]   Past Surgical History[2]   Family History[3]   Social History[4]   E-Cigarette/Vaping    E-Cigarette Use Never User       E-Cigarette/Vaping Substances    Nicotine No     THC No     CBD No     Flavoring No     Other No     Unknown No       I have reviewed and agree with the history as documented.     Patient is a 34-year-old male history of anxiety, depression, ethanol abuse presenting for detox evaluation.  States he drinks about a quart of vodka per day most recently at 1100 today.  Patient desiring to quit alcohol.  Patient states he overall feels down about himself but denies SI/HI/AH/VH.  Patient states he has had symptoms of severe withdrawal in the past including withdrawal seizures.  Patient states he currently feels mildly anxious but otherwise asymptomatic.  Patient denies nausea, vomiting, chest pain, shortness of breath, abdominal pain, diarrhea, constipation, blood in stool.  Patient denies additional medical complaint at this time.        Review of Systems   Constitutional:  Negative for chills, fatigue and fever.   Respiratory:  Negative for cough and shortness of breath.    Cardiovascular:  Negative for chest pain.   Gastrointestinal:  Negative for abdominal pain, diarrhea, nausea and vomiting.   Genitourinary:  Negative for flank pain and frequency.   Musculoskeletal:  Negative for arthralgias and myalgias.   Neurological:  Negative for weakness, light-headedness, numbness and  headaches.   Psychiatric/Behavioral:  Negative for confusion. The patient is nervous/anxious.    All other systems reviewed and are negative.          Objective       ED Triage Vitals [05/31/25 1259]   Temperature Pulse Blood Pressure Respirations SpO2 Patient Position - Orthostatic VS   98 °F (36.7 °C) 96 107/72 18 95 % Lying      Temp Source Heart Rate Source BP Location FiO2 (%) Pain Score    Oral Monitor Right arm -- --      Vitals      Date and Time Temp Pulse SpO2 Resp BP Pain Score FACES Pain Rating User   05/31/25 1900 -- 88 94 % 20 101/65 -- -- LS   05/31/25 1830 -- 92 94 % -- 103/62 -- -- LS   05/31/25 1807 98.1 °F (36.7 °C) 101 95 % 16 107/65 -- -- OE   05/31/25 1600 -- 98 95 % 16 104/55 -- -- OE   05/31/25 1500 -- 92 95 % 18 100/59 -- --    05/31/25 1430 -- 83 92 % 14 100/55 -- -- OE   05/31/25 1427 -- 92 94 % -- 95/50 -- -- OE   05/31/25 1259 98 °F (36.7 °C) 96 95 % 18 107/72 -- -- OE            Physical Exam  Vitals and nursing note reviewed.   Constitutional:       General: He is not in acute distress.     Appearance: Normal appearance. He is not ill-appearing, toxic-appearing or diaphoretic.   HENT:      Head: Normocephalic and atraumatic.      Comments: Moist mucous membranes     Right Ear: External ear normal.      Left Ear: External ear normal.     Eyes:      General:         Right eye: No discharge.         Left eye: No discharge.       Cardiovascular:      Comments: Regular rate and rhythm, no murmurs rubs or gallops.  Extremities warm well-perfused without mottling  Pulmonary:      Effort: No respiratory distress.      Comments: No increased work of breathing.  Speaking in complete sentences.  Lungs clear to auscultation bilaterally without wheezes, rales, rhonchi.  Satting 95% on room air indicating adequate oxygenation  Abdominal:      General: There is no distension.      Comments: Abdomen nondistended with normal bowel sounds, nontender without rigidity, rebound, guarding      Musculoskeletal:         General: No deformity.      Cervical back: Normal range of motion.     Skin:     Findings: No lesion or rash.     Neurological:      Mental Status: He is alert and oriented to person, place, and time. Mental status is at baseline.      Comments: Awake, alert, pleasant, interactive   Psychiatric:         Mood and Affect: Mood and affect normal.         Results Reviewed       Procedure Component Value Units Date/Time    Rapid drug screen, urine [[id number]]  (Normal) Collected: 05/31/25 1315    Lab Status: Final result Specimen: Urine, Clean Catch Updated: 05/31/25 1405     Amph/Meth UR Negative     Barbiturate Ur Negative     Benzodiazepine Urine Negative     Cocaine Urine Negative     Methadone Urine Negative     Opiate Urine Negative     PCP Ur Negative     THC Urine Negative     Oxycodone Urine Negative     Fentanyl Urine Negative     HYDROCODONE URINE Negative    Narrative:      FOR MEDICAL PURPOSES ONLY.   IF CONFIRMATION NEEDED PLEASE CONTACT THE LAB WITHIN 5 DAYS.    Drug Screen Cutoff Levels:  AMPHETAMINE/METHAMPHETAMINES  1000 ng/mL  BARBITURATES     200 ng/mL  BENZODIAZEPINES     200 ng/mL  COCAINE      300 ng/mL  METHADONE      300 ng/mL  OPIATES      300 ng/mL  PHENCYCLIDINE     25 ng/mL  THC       50 ng/mL  OXYCODONE      100 ng/mL  FENTANYL      5 ng/mL  HYDROCODONE     300 ng/mL    Comprehensive metabolic panel [[id number]] Collected: 05/31/25 1313    Lab Status: Final result Specimen: Blood from Arm, Left Updated: 05/31/25 1339     Sodium 140 mmol/L      Potassium 3.6 mmol/L      Chloride 105 mmol/L      CO2 23 mmol/L      ANION GAP 12 mmol/L      BUN 17 mg/dL      Creatinine 1.16 mg/dL      Glucose 123 mg/dL      Calcium 9.0 mg/dL      AST 19 U/L      ALT 19 U/L      Alkaline Phosphatase 86 U/L      Total Protein 7.5 g/dL      Albumin 4.5 g/dL      Total Bilirubin 0.50 mg/dL      eGFR 80 ml/min/1.73sq m     Narrative:      National Kid[employer] Foundation guidelines for  Chronic Kidney Disease (CKD):     Stage 1 with normal or high GFR (GFR > 90 mL/min/1.73 square meters)    Stage 2 Mild CKD (GFR = 60-89 mL/min/1.73 square meters)    Stage 3A Moderate CKD (GFR = 45-59 mL/min/1.73 square meters)    Stage 3B Moderate CKD (GFR = 30-44 mL/min/1.73 square meters)    Stage 4 Severe CKD (GFR = 15-29 mL/min/1.73 square meters)    Stage 5 End Stage CKD (GFR <15 mL/min/1.73 square meters)  Note: GFR calculation is accurate only with a steady state creatinine    Ethanol [[id number]]  (Abnormal) Collected: 05/31/25 1313    Lab Status: Final result Specimen: Blood from Arm, Left Updated: 05/31/25 1339     Ethanol Lvl 247 mg/dL     Urinalysis with microscopic [[id number]]  (Abnormal) Collected: 05/31/25 1315    Lab Status: Final result Specimen: Urine, Clean Catch Updated: 05/31/25 1330     Color, UA Yellow     Clarity, UA Clear     Specific Gravity, UA 1.015     pH, UA 5.5     Leukocytes, UA Negative     Nitrite, UA Negative     Protein, UA Negative mg/dl      Glucose, UA Negative mg/dl      Ketones, UA Trace mg/dl      Urobilinogen, UA <2.0 mg/dl      Bilirubin, UA Negative     Occult Blood, UA Negative     RBC, UA 0-1 /hpf      WBC, UA None Seen /hpf      Epithelial Cells Occasional /hpf      Bacteria, UA None Seen /hpf     CBC and differential [[id number]]  (Abnormal) Collected: 05/31/25 1313    Lab Status: Final result Specimen: Blood from Arm, Left Updated: 05/31/25 1320     WBC 11.10 Thousand/uL      RBC 5.24 Million/uL      Hemoglobin 15.7 g/dL      Hematocrit 44.7 %      MCV 85 fL      MCH 30.0 pg      MCHC 35.1 g/dL      RDW 12.9 %      MPV 9.2 fL      Platelets 253 Thousands/uL      nRBC 0 /100 WBCs      Segmented % 74 %      Immature Grans % 0 %      Lymphocytes % 19 %      Monocytes % 6 %      Eosinophils Relative 1 %      Basophils Relative 0 %      Absolute Neutrophils 8.24 Thousands/µL      Absolute Immature Grans 0.03 Thousand/uL      Absolute Lymphocytes 2.06 Thousands/µL       Absolute Monocytes 0.65 Thousand/µL      Eosinophils Absolute 0.08 Thousand/µL      Basophils Absolute 0.04 Thousands/µL             No orders to display       Procedures    ED Medication and Procedure Management   Prior to Admission Medications   Prescriptions Last Dose Informant Patient Reported? Taking?   QUEtiapine (SEROquel) 50 mg tablet   No No   Sig: Take 1 tablet (50 mg total) by mouth daily at bedtime as needed (Sleep)   Patient taking differently: Take 150 mg by mouth daily at bedtime   buPROPion (WELLBUTRIN XL) 150 mg 24 hr tablet   No No   Sig: Take 1 tablet (150 mg total) by mouth daily Do not start before July 29, 2023.   Patient taking differently: Take 300 mg by mouth every morning   disulfiram (ANTABUSE) 250 mg tablet   Yes No   Sig: Take 250 mg by mouth every morning   gabapentin (NEURONTIN) 600 MG tablet   Yes No   Sig: Take 600 mg by mouth in the morning and 600 mg in the evening and 600 mg before bedtime.      Facility-Administered Medications: None     Discharge Medication List as of 5/31/2025  8:42 PM        CONTINUE these medications which have NOT CHANGED    Details   buPROPion (WELLBUTRIN XL) 150 mg 24 hr tablet Take 1 tablet (150 mg total) by mouth daily Do not start before July 29, 2023., Starting Sat 7/29/2023, No Print      disulfiram (ANTABUSE) 250 mg tablet Take 250 mg by mouth every morning, Starting Mon 7/17/2023, Until Fri 9/15/2023, Historical Med      gabapentin (NEURONTIN) 600 MG tablet Take 600 mg by mouth 3 (three) times a day, Historical Med      QUEtiapine (SEROquel) 50 mg tablet Take 1 tablet (50 mg total) by mouth daily at bedtime as needed (Sleep), Starting Fri 7/28/2023, No Print      mupirocin (BACTROBAN) 2 % ointment Apply topically 3 (three) times a day, Starting Wed 11/1/2023, Normal      SERTRALINE HCL PO Take 150 mg by mouth every morning, Historical Med           No discharge procedures on file.  ED SEPSIS DOCUMENTATION   Time reflects when diagnosis was  documented in both MDM as applicable and the Disposition within this note       Time User Action Codes Description Comment    5/31/2025  2:16 PM Jose Ching Add [F10.923] Alcohol intoxication ([facility])     5/31/2025  2:16 PM Jose Ching Remove [F10.929] Alcohol intoxication ([facility])     5/31/2025  2:16 PM Jose Cihng Add [F10.10] Alcohol abuse                      [1]   Past Medical History:  Diagnosis Date    Anxiety     CVA (cerebral vascular accident) ([facility]) 2011 - no residual 08/10/2023    Depression     ETOH abuse     40 days sober as of 8/10/23    Hematuria     Kidney stone     Nephrolithiasis with hydronephrosis 07/26/2023    Suicide attempt ([facility]) 06/13/2023   [2]   Past Surgical History:  Procedure Laterality Date    FINGER SURGERY  2008    infection of the finger    FRACTURE SURGERY Left     lower arm fx-plate    MANDIBLE FRACTURE SURGERY      screws    AR CYSTO/URETERO W/LITHOTRIPSY &INDWELL STENT INSRT Right 07/27/2023    Procedure: CYSTOSCOPY RIGHT URETEROSCOPY, STONE MANIPULATION, INSERTION STENT URETERAL;  Surgeon: Augusto Sims MD;  Location: [facility];  Service: Urology    AR CYSTO/URETERO W/LITHOTRIPSY &INDWELL STENT INSRT Right 8/10/2023    Procedure: CYSTOSCOPY, URETEROSCOPY WITH LITHOTRIPSY HOLMIUM LASER, STONE BASKET EXTRACTION, STENT URETERAL;  Surgeon: Augusto Sims MD;  Location: [facility];  Service: Urology   [3]   Family History  Problem Relation Name Age of Onset    Depression Mother      Heart disease Father          MI    Depression Father     [4]   Social History  Tobacco Use    Smoking status: Every Day     Current packs/day: 0.50     Types: Cigarettes    Smokeless tobacco: Never   Vaping Use    Vaping status: Never Used   Substance Use Topics    Alcohol use: Yes     Alcohol/week: 6.0 standard drinks of alcohol     Types: 2 Glasses of wine, 4 Cans of beer per week     Comment: 2 pints of vodka or more with binge drinking -in recovery 40 days sober    Drug use:  Not Currently     Types: Marijuana, Cocaine     Comment: early 20's- none since 2018        Jose Ching MD  05/31/25 [id number]

## 2025-05-31 NOTE — EMTALA/ACUTE CARE TRANSFER
[facility] EMERGENCY DEPARTMENT  [address] [address]  Dept: [phone]      EMTALA TRANSFER CONSENT    NAME Bon Hazel                                         1990                              MRN [id number]    I have been informed of my rights regarding examination, treatment, and transfer   by Dr. Jose Ching MD    Benefits: Specialized equipment and/or services available at the receiving facility (Include comment)________________________    Risks: Potential for delay in receiving treatment      Consent for Transfer:  I acknowledge that my medical condition has been evaluated and explained to me by the emergency department physician or other qualified medical person and/or my attending physician, who has recommended that I be transferred to the service of  Accepting Physician: Dr. Declan Goyal at Accepting Facility Name, City & State : [facility]. The above potential benefits of such transfer, the potential risks associated with such transfer, and the probable risks of not being transferred have been explained to me, and I fully understand them.  The doctor has explained that, in my case, the benefits of transfer outweigh the risks.  I agree to be transferred.    I authorize the performance of emergency medical procedures and treatments upon me in both transit and upon arrival at the receiving facility.  Additionally, I authorize the release of any and all medical records to the receiving facility and request they be transported with me, if possible.  I understand that the safest mode of transportation during a medical emergency is an ambulance and that the Hospital advocates the use of this mode of transport. Risks of traveling to the receiving facility by car, including absence of medical control, life sustaining equipment, such as oxygen, and medical personnel has been explained to me and I fully understand them.    (BERTO CORRECT BOX BELOW)  [  ]  I consent  to the stated transfer and to be transported by ambulance/helicopter.  [  ]  I consent to the stated transfer, but refuse transportation by ambulance and accept full responsibility for my transportation by car.  I understand the risks of non-ambulance transfers and I exonerate the Hospital and its staff from any deterioration in my condition that results from this refusal.    X___________________________________________    DATE  25  TIME________  Signature of patient or legally responsible individual signing on patient behalf           RELATIONSHIP TO PATIENT_________________________          Provider Certification    NAME Bon Hazel                                         1990                              MRN [id number]    A medical screening exam was performed on the above named patient.  Based on the examination:    Condition Necessitating Transfer The encounter diagnosis was Alcohol abuse.    Patient Condition: The patient has been stabilized such that within reasonable medical probability, no material deterioration of the patient condition or the condition of the unborn child(mayito) is likely to result from the transfer    Reason for Transfer: Level of Care needed not available at this facility    Transfer Requirements: Facility \[facility]\""   Space available and qualified personnel available for treatment as acknowledged by    Agreed to accept transfer and to provide appropriate medical treatment as acknowledged by       Dr. Declan Goyal  Appropriate medical records of the examination and treatment of the patient are provided at the time of transfer   STAFF INITIAL WHEN COMPLETED _______  Transfer will be performed by qualified personnel from    and appropriate transfer equipment as required, including the use of necessary and appropriate life support measures.    Provider Certification: I have examined the patient and explained the following risks and benefits of being transferred/refusing transfer  to the patient/family:  General risk, such as traffic hazards, adverse weather conditions, rough terrain or turbulence, possible failure of equipment (including vehicle or aircraft), or consequences of actions of persons outside the control of the transport personnel      Based on these reasonable risks and benefits to the patient and/or the unborn child(mayito), and based upon the information available at the time of the patient’s examination, I certify that the medical benefits reasonably to be expected from the provision of appropriate medical treatments at another medical facility outweigh the increasing risks, if any, to the individual’s medical condition, and in the case of labor to the unborn child, from effecting the transfer.    X____________________________________________ DATE 05/31/25        TIME_______      ORIGINAL - SEND TO MEDICAL RECORDS   COPY - SEND WITH PATIENT DURING TRANSFER

## 2025-06-01 PROBLEM — E86.0 DEHYDRATION: Status: RESOLVED | Noted: 2025-05-31 | Resolved: 2025-06-01

## 2025-06-01 LAB
ALBUMIN SERPL BCG-MCNC: 3.6 G/DL (ref 3.5–5)
ALP SERPL-CCNC: 64 U/L (ref 34–104)
ALT SERPL W P-5'-P-CCNC: 13 U/L (ref 7–52)
ANION GAP SERPL CALCULATED.3IONS-SCNC: 6 MMOL/L (ref 4–13)
AST SERPL W P-5'-P-CCNC: 16 U/L (ref 13–39)
BILIRUB SERPL-MCNC: 0.64 MG/DL (ref 0.2–1)
BUN SERPL-MCNC: 17 MG/DL (ref 5–25)
CALCIUM SERPL-MCNC: 8.1 MG/DL (ref 8.4–10.2)
CHLORIDE SERPL-SCNC: 108 MMOL/L (ref 96–108)
CO2 SERPL-SCNC: 27 MMOL/L (ref 21–32)
CREAT SERPL-MCNC: 1.06 MG/DL (ref 0.6–1.3)
ERYTHROCYTE [DISTWIDTH] IN BLOOD BY AUTOMATED COUNT: 12.9 % (ref 11.6–15.1)
GFR SERPL CREATININE-BSD FRML MDRD: 89 ML/MIN/1.73SQ M
GLUCOSE SERPL-MCNC: 99 MG/DL (ref 65–140)
HCT VFR BLD AUTO: 38.9 % (ref 36.5–49.3)
HGB BLD-MCNC: 13.1 G/DL (ref 12–17)
MAGNESIUM SERPL-MCNC: 1.9 MG/DL (ref 1.9–2.7)
MCH RBC QN AUTO: 29.1 PG (ref 26.8–34.3)
MCHC RBC AUTO-ENTMCNC: 33.7 G/DL (ref 31.4–37.4)
MCV RBC AUTO: 86 FL (ref 82–98)
PLATELET # BLD AUTO: 201 THOUSANDS/UL (ref 149–390)
PMV BLD AUTO: 9.1 FL (ref 8.9–12.7)
POTASSIUM SERPL-SCNC: 4 MMOL/L (ref 3.5–5.3)
PROT SERPL-MCNC: 5.9 G/DL (ref 6.4–8.4)
RBC # BLD AUTO: 4.5 MILLION/UL (ref 3.88–5.62)
SODIUM SERPL-SCNC: 141 MMOL/L (ref 135–147)
WBC # BLD AUTO: 6.29 THOUSAND/UL (ref 4.31–10.16)

## 2025-06-01 PROCEDURE — 99232 SBSQ HOSP IP/OBS MODERATE 35: CPT

## 2025-06-01 PROCEDURE — 85027 COMPLETE CBC AUTOMATED: CPT

## 2025-06-01 PROCEDURE — 99255 IP/OBS CONSLTJ NEW/EST HI 80: CPT | Performed by: EMERGENCY MEDICINE

## 2025-06-01 PROCEDURE — 83735 ASSAY OF MAGNESIUM: CPT

## 2025-06-01 PROCEDURE — 80053 COMPREHEN METABOLIC PANEL: CPT

## 2025-06-01 RX ORDER — PHENOBARBITAL 64.8 MG/1
64.8 TABLET ORAL ONCE
Status: COMPLETED | OUTPATIENT
Start: 2025-06-01 | End: 2025-06-01

## 2025-06-01 RX ORDER — HYDROXYZINE HYDROCHLORIDE 50 MG/1
50 TABLET, FILM COATED ORAL EVERY 6 HOURS PRN
Status: DISCONTINUED | OUTPATIENT
Start: 2025-06-01 | End: 2025-06-02 | Stop reason: HOSPADM

## 2025-06-01 RX ADMIN — PHENOBARBITAL 64.8 MG: 64.8 TABLET ORAL at 06:02

## 2025-06-01 RX ADMIN — ESCITALOPRAM OXALATE 20 MG: 10 TABLET ORAL at 08:30

## 2025-06-01 RX ADMIN — LAMOTRIGINE 50 MG: 25 TABLET ORAL at 08:30

## 2025-06-01 RX ADMIN — QUETIAPINE FUMARATE 150 MG: 100 TABLET ORAL at 21:45

## 2025-06-01 RX ADMIN — GABAPENTIN 600 MG: 300 CAPSULE ORAL at 16:11

## 2025-06-01 RX ADMIN — GABAPENTIN 600 MG: 300 CAPSULE ORAL at 21:46

## 2025-06-01 RX ADMIN — Medication 3 MG: at 21:45

## 2025-06-01 RX ADMIN — GABAPENTIN 600 MG: 300 CAPSULE ORAL at 08:30

## 2025-06-01 NOTE — ASSESSMENT & PLAN NOTE
Patient has home medication regimen of Wellbutrin 300 mg,  Lexapro 20 mg, Seroquel 150 mg, Gabapentin 600 mg TID, Lamictal 50 mg daily  Will hold Wellbutrin in the setting of alcohol withdrawal as it lowers seizure threshold and patient has hx of withdrawal seizures   Continue other psychiatric medication

## 2025-06-01 NOTE — DISCHARGE INSTR - OTHER ORDERS
AA meeting guide   https://www.aa.org/find-aa    AA Phone apps:   Meeting Guide  Everything AA  In the Rooms    AA/24 hour hotline   [phone]    SMART Recovery Meetings    Self Management and Recovery Training (SMART)  SMART Recovery is an evidenced-informed recovery method grounded in Rational Emotive Behavioral Therapy (REBT) and Cognitive Behavioral Therapy (CBT), that supports people with substance dependencies or problem behaviors to:  Build and maintain motivation  Zuni with urges and cravings  Manage thoughts, feelings and behaviors  Live a balanced life    Find meetings and tools: https://SeeMore Interactiveorg/    SYNC Recovery Events    [facility] organizes meaningful events for people in recovery and their families. Our main goal is to have fun by connecting with nature and other people. We can then realize that there is a world of possibilities open to us, now that we are no longer in the bondage of addiction. These events are designed to provide :  Hope for those in early recovery  Tangible proof that life gets better when we’re sober  Service opportunities for people with recovery experience  Social connectedness for everyone involved    [address]  [place] [address]  Phone: [phone]  Email: info@[employer]   Website: https://SEElogix/    Local Food Pantries  https://www.[facility].gov/government/human-services/division-of-temporary-assistance-and-social-services/food-pantries    [facility] Resources  [address] [place] [address]   Phone: [phone]  Fax: [phone]   Mon-Thurs: 8:30AM - 4:30PM  Friday: 8:30AM - 12:00PM     State Rental Assistance Program (SRAP):  This [place] state program provides rental subsidies for very low-income residents.   https://www.[place].gov/dca/dhcr/offices/srap.shtml#:~:text=The%20[place]%20[place]%20State%20Rental,SRAP%20income%20and%20eligibility%20requirements.  [facility] :  Offers a range of services  including emergency funds and homelessness prevention programs, including limited funding through a state kami.   Other Organizations:  [facility] [phone] provides transitional housing and services, and [facility] offers homeownership opportunities for eligible families.   Affordable Housing:  VA hospital provides affordable housing as required by the [facility], including both rentals and for purchase housing.   Housing Rehabilitation Program:  VA hospital also offers a Housing Rehabilitation Program that provides financial assistance for home improvements.   Emergency Assistance:  [place] 2-1-1 [facility] provides emergency assistance to recipients of various programs, including WFNJ/TANF/GA and SSI, for shelter, housing costs, utilities, and moving expenses.     Utility Assistance Programs    LIHEAP (Low Income Home Energy Assistance Program):  This program helps low-income households pay for winter heating bills and medically necessary cooling costs, and can cover renters with heating costs included in rent. Applications are typically accepted from October 1 to June 30. [phone]     USF (Universal Service Fund):  .  USF provides ongoing support for low-income households to help pay for their electricity and gas bills. [phone]     LIHWAP (Low-Income Household Water Assistance Program):  .  LIHWAP provides assistance with water and/or  bills for eligible households. 1-919.208.5405   [place] [employer] Utility Assistance Program:    This program provides one-time assistance to households experiencing a temporary financial crisis to help with gas and electric bills.   https://apply.[employer].org/    Lifeline Utility Assistance:    This program offers a $225 annual benefit to older adults and people with disabilities who meet specific eligibility requirements. 1-590.725.6612   Applying for  Assistance:  [employer]:  [employer], a local community action agency, handles applications for several programs, including LIHEAP, USF, WAP, and others. [phone]  LIHEAP and USF:  Applications for these programs can often be found online or obtained by calling the relevant agency.   [place] 2-1-1:  Dialing 2-1-1 can connect you to resources and information about various assistance programs, including utility assistance.   Local Community Action Agencies:  These agencies can provide information about available programs and application procedures.   Other Important Information:  The [place] [employer] Program helps income-eligible customers reduce their utility bills through energy-saving measures at no cost. [phone]   [employer] offers cooling assistance, heating emergency assistance, and emergency assistance for non-heating electric accounts.   https://dcaid.[facility].[place].gov/en-[place]/    CRISIS INFORMATION  [facility]  Community Carelinks Program  ([phone]  [facility]  Outpatient mental health, sexual abuse treatment, and suicide prevention treatment  ([phone]  [facility]  Family Crisis Intervention Unit  Crisis Hotline (276) 195-2903    DRUG AND ALCOHOL RESOURCES  Community Prevention Resources [facility]*  Education/Prevention and Recovery Support  [phone]  [facility]*  Outpatient, Intensive Outpatient Services, and [facility] Based Youth Services  [phone]  [facility]*  Outpatient, Intensive Outpatient Services, and Recovery House ([facility])  [phone]  [facility]*  Withdrawal Management, Short-term Residential, Short-term with Co-occurring Disorders  [phone]  Parent to Parent*  Recovery Support Services  [phone]  [facility]*  Medication Assisted Treatment  [phone]    From the [facility] website  [place]guides/opioid-epidemic/#GetNaloxone    How do I get naloxone?  Family members and friends can access naloxone by:    Obtaining a prescription from their family doctor  Using the standing order issued by Acting Physician General Matilda Ruiz. A standing order is a prescription written for the general public, rather than specifically for an individual.  To use the standing order, print it and take it with you to the pharmacy or have the digital version on your phone. Download the standing order from the Department of [facility] (PDF).    If you are unable to print it or use the digital version, the standing order is kept on file at many pharmacies. If a pharmacy does not have it on file, they may have the ability to look it up.    Naloxone prescriptions can be filled at most pharmacies. Although the medication might not be available for same-day pickup, it often can be ordered and available within a day or two.

## 2025-06-01 NOTE — PLAN OF CARE
Problem: PAIN - ADULT  Goal: Verbalizes/displays adequate comfort level or baseline comfort level  Description: Interventions:  - Encourage patient to monitor pain and request assistance  - Assess pain using appropriate pain scale  - Administer analgesics as ordered based on type and severity of pain and evaluate response  - Implement non-pharmacological measures as appropriate and evaluate response  - Consider cultural and social influences on pain and pain management  - Notify physician/advanced practitioner if interventions unsuccessful or patient reports new pain  - Educate patient/family on pain management process including their role and importance of  reporting pain   - Provide non-pharmacologic/complimentary pain relief interventions  5/31/2025 2146 by Linda Izquierdo RN  Outcome: Progressing  5/31/2025 2143 by Linda Izquierdo RN  Outcome: Progressing     Problem: Knowledge Deficit  Goal: Patient/family/caregiver demonstrates understanding of disease process, treatment plan, medications, and discharge instructions  Description: Complete learning assessment and assess knowledge base.  Interventions:  - Provide teaching at level of understanding  - Provide teaching via preferred learning methods  5/31/2025 2146 by Linda Izquierdo RN  Outcome: Progressing  5/31/2025 2143 by Linda Izquierdo RN  Outcome: Progressing     Problem: SAFETY ADULT  Goal: Patient will remain free of falls  Description: INTERVENTIONS:  - Educate patient/family on patient safety including physical limitations  - Instruct patient to call for assistance with activity   - Consider consulting OT/PT to assist with strengthening/mobility based on AM PAC & JH-HLM score  - Consult OT/PT to assist with strengthening/mobility   - Keep Call bell within reach  - Keep bed low and locked with side rails adjusted as appropriate  - Keep care items and personal belongings within reach  - Initiate and maintain comfort rounds  - Make Fall Risk  Sign visible to staff  - Offer Toileting every 2 Hours, in advance of need  - Obtain necessary fall risk management equipment:   - Apply yellow socks and bracelet for high fall risk patients  - Consider moving patient to room near nurses station  Outcome: Progressing     Problem: SUBSTANCE USE/ABUSE  Goal: By discharge, will develop insight into their chemical dependency and sustain motivation to continue in recovery  Description: INTERVENTIONS:  - Attends all daily group sessions and scheduled AA groups  - Actively practices coping skills through participation in the therapeutic community and adherence to program rules  - Reviews and completes assignments from individual treatment plan  - Assist patient development of understanding of their personal cycle of addiction and relapse triggers  Outcome: Progressing  Goal: By discharge, patient will have ongoing treatment plan addressing chemical dependency  Description: INTERVENTIONS:  - Assist patient with resources and/or appointments for ongoing recovery based living  Outcome: Progressing

## 2025-06-01 NOTE — PLAN OF CARE
Problem: SUBSTANCE USE/ABUSE  Goal: By discharge, will develop insight into their chemical dependency and sustain motivation to continue in recovery  Description: INTERVENTIONS:  - Assist patient development of understanding of their personal cycle of addiction and relapse triggers  Outcome: Progressing  Goal: By discharge, patient will have ongoing treatment plan addressing chemical dependency  Description: INTERVENTIONS:  - Assist patient with resources and/or appointments for ongoing recovery based living  Outcome: Progressing

## 2025-06-01 NOTE — ASSESSMENT & PLAN NOTE
Continue supportive care measures, including airway monitoring, head of bed elevation, aspiration precautions, cardiac telemetry, and continuous pulse oximetry.    Patient reports drinking alcohol for 3 days prior to ED presentation but previously was abstinent for about 30 days. Low risk of complicated withdrawal.    Has received 1 dose of phenobarbital but is without objective withdrawal symptoms. Will monitor clinically off SEWS today.   Ambulatory

## 2025-06-01 NOTE — ASSESSMENT & PLAN NOTE
Last drink: 5/31/25   Ethanol lvl: 247   Received Librium in ED  Toxicology consult  Follow SEWS protocol with symptom-triggered phenobarbital for medically-assisted alcohol withdrawal  Current symptoms include: Nausea vomiting and anxiety  SEWS score upon admission: 6  Administer 65 mg phenobarbital + valium 10 mg IV   Telemetry and pulse oximetry monitoring   Continue to monitor vitals, symptoms

## 2025-06-01 NOTE — H&P
H&P - Hospitalist   Name: Bon Hazel 34 y.o. male I MRN: [id number]  Unit/Bed#: 5T DETOX 513-01 I Date of Admission: 5/31/2025   Date of Service: 5/31/2025 I Hospital Day: 0     Assessment & Plan  Alcohol withdrawal syndrome with complication (HCC)  Last drink: 5/31/25   Ethanol lvl: 247   Received Librium in ED  Toxicology consult  Follow SEWS protocol with symptom-triggered phenobarbital for medically-assisted alcohol withdrawal  Current symptoms include: Nausea vomiting and anxiety  SEWS score upon admission: 6  Administer 65 mg phenobarbital + valium 10 mg IV   Telemetry and pulse oximetry monitoring   Continue to monitor vitals, symptoms  Alcohol use disorder, severe, dependence (HCC)  Pt with a h/o of daily alcohol use   Drinks a quart of vodka daily  Reports previous history of withdrawal complicated by seizures   Previously on antabuse  Withdrawal management as above  Initiate IVFs, IV thiamine, folic acid, and MVI  Consult case management/CRS for assistance with aftercare resources - pt interested in inpatient drug and alcohol rehab upon discharge at this time   Anxiety and depression  Patient has home medication regimen of Wellbutrin 300 mg,  Lexapro 20 mg, Seroquel 150 mg, Gabapentin 600 mg TID, Lamictal 50 mg daily  Will hold Wellbutrin in the setting of alcohol withdrawal as it lowers seizure threshold and patient has hx of withdrawal seizures   Continue other psychiatric medication   Dehydration  E/b hemoconcentration on CBC with Hgb 15.1, WBC 11.1  IVF hydration  Continue monitoring CBC   Tobacco abuse  Smokes 1/2 PPD  Nicotine patch 14 mg  Smoking cessation recommended  Administrative Statements   VIRTUAL CARE DOCUMENTATION:     1. This service was provided via Telemedicine using Teams Virtual Rounding      2. Parties in the room with patient during teleconsult RN    3. Confidentiality My office door was closed     4. Participants No one else was in the room    5. Patient acknowledged consent and  understanding of privacy and security of the  Telemedicine consult. I informed the patient that I have reviewed their record in Epic and presented the opportunity for them to ask any questions regarding the visit today.  The patient agreed to participate.    6. I have spent a total time of 75 minutes in caring for this patient on the day of the visit/encounter including Diagnostic results, Counseling / Coordination of care, Documenting in the medical record, Reviewing/placing orders in the medical record (including tests, medications, and/or procedures), and Obtaining or reviewing history  , not including the time spent for establishing the audio/video connection.        VTE Pharmacologic Prophylaxis: VTE Score: 0 Low Risk (Score 0-2) - Encourage Ambulation.  Code Status: full code  Discussion with patient    Anticipated Length of Stay: Patient will be admitted on an inpatient basis with an anticipated length of stay of greater than 2 midnights secondary to alcohol withdrawal support, specialist input.    History of Present Illness   Chief Complaint: alcohol withdrawal    Bon Hazel is a 34 y.o. male with a PMH of CVA 2011, anxiety and depression, tobacco abuse, alcohol dependence who presents with alcohol withdrawal. Patient initially presented to the [facility] ED requesting alcohol detox. Reports previous history of withdrawal complicated by seizures. Therefore patient was transferred to [facility] for medical detoxification. Patient reports he just left [facility] in [place], [place] on Friday he relapsed while he was there. Patient endorses drinking a Liter of vodka on 5/30 and a pint of vodka today. Last drink on 5/31/25. Serum Ethanol in the . Patient was previously on antabuse and reports having a period of sobriety with this medication.  He After medical detoxification patient is interested in inpatient drug and alcohol rehab.     Review of  Systems   Constitutional:  Negative for chills and fever.   HENT:  Positive for sore throat. Negative for rhinorrhea and trouble swallowing.    Eyes:  Negative for discharge and redness.   Respiratory:  Negative for cough and shortness of breath.    Cardiovascular:  Negative for chest pain and palpitations.   Gastrointestinal:  Positive for abdominal pain, nausea and vomiting. Negative for diarrhea.   Genitourinary:  Negative for dysuria and hematuria.   Musculoskeletal:  Positive for myalgias. Negative for back pain and neck pain.   Skin:  Negative for rash and wound.   Neurological:  Negative for dizziness, tremors, weakness and headaches.   Psychiatric/Behavioral:  The patient is nervous/anxious.        Historical Information   Past Medical History[1]  Past Surgical History[2]  Social History[3]  E-Cigarette/Vaping    E-Cigarette Use Never User      E-Cigarette/Vaping Substances    Nicotine No     THC No     CBD No     Flavoring No     Other No     Unknown No      Family History[4]  Social History:  Marital Status: Single   Occupation: unemployed  Patient Pre-hospital Living Situation: [facility] for past 5-6 years  Patient Pre-hospital Level of Mobility: walks  Patient Pre-hospital Diet Restrictions: none    Meds/Allergies   I have reviewed home medications with patient personally.  Prior to Admission medications    Medication Sig Start Date End Date Taking? Authorizing Provider   buPROPion (WELLBUTRIN XL) 150 mg 24 hr tablet Take 1 tablet (150 mg total) by mouth daily Do not start before July 29, 2023.  Patient taking differently: Take 150 mg by mouth every morning 7/29/23   Luisa Torres MD   disulfiram (ANTABUSE) 250 mg tablet Take 250 mg by mouth every morning 7/17/23 9/15/23  Historical Provider, MD   gabapentin (NEURONTIN) 600 MG tablet Take 600 mg by mouth 3 (three) times a day    Historical Provider, MD   mupirocin (BACTROBAN) 2 % ointment Apply topically 3 (three) times a day 11/1/23    Krish Ramos DO   QUEtiapine (SEROquel) 50 mg tablet Take 1 tablet (50 mg total) by mouth daily at bedtime as needed (Sleep)  Patient taking differently: Take 50 mg by mouth daily at bedtime 7/28/23   Luisa Torres MD   SERTRALINE HCL PO Take 150 mg by mouth every morning    Historical Provider, MD     Allergies   Allergen Reactions    Iv Dye [Iodinated Contrast Media] Anaphylaxis     Hives, dyspnea    Tetanus-Diphth-Acell Pertussis Anaphylaxis     Hives, dyspnea       Objective :  Temp:  [98 °F (36.7 °C)-98.1 °F (36.7 °C)] 98.1 °F (36.7 °C)  HR:  [] 88  BP: ()/(50-72) 101/65  Resp:  [14-20] 20  SpO2:  [92 %-95 %] 94 %  O2 Device: None (Room air)    Physical Exam  Vitals and nursing note reviewed.   Constitutional:       Appearance: He is well-developed.      Comments: Middle-age  male awake and alert   HENT:      Head: Normocephalic and atraumatic.     Eyes:      General:         Right eye: No discharge.         Left eye: No discharge.     Neck:      Trachea: No tracheal deviation.      Comments: Able to turn head side-to-side without difficulty  Cardiovascular:      Rate and Rhythm: Normal rate.      Comments: Rate 88  Pulmonary:      Effort: Pulmonary effort is normal.      Comments: Respiratory rate 20, 94% room air, speaking full sentences without difficulty  Abdominal:      General: Bowel sounds are normal.      Comments: Generalized abdominal discomfort     Musculoskeletal:         General: Normal range of motion.      Cervical back: Normal range of motion.      Comments: Moving extremities without difficulty     Skin:     Comments: No visible lesions noted     Neurological:      General: No focal deficit present.      Mental Status: He is oriented to person, place, and time. Mental status is at baseline.     Psychiatric:         Mood and Affect: Mood normal.         Behavior: Behavior normal.        Lines/Drains:      Lab Results: I have reviewed the following results:  Results  "from last 7 days   Lab Units 05/31/25  1313   WBC Thousand/uL 11.10*   HEMOGLOBIN g/dL 15.7   HEMATOCRIT % 44.7   PLATELETS Thousands/uL 253   SEGS PCT % 74   LYMPHO PCT % 19   MONO PCT % 6   EOS PCT % 1     Results from last 7 days   Lab Units 05/31/25  1313   SODIUM mmol/L 140   POTASSIUM mmol/L 3.6   CHLORIDE mmol/L 105   CO2 mmol/L 23   BUN mg/dL 17   CREATININE mg/dL 1.16   ANION GAP mmol/L 12   CALCIUM mg/dL 9.0   ALBUMIN g/dL 4.5   TOTAL BILIRUBIN mg/dL 0.50   ALK PHOS U/L 86   ALT U/L 19   AST U/L 19   GLUCOSE RANDOM mg/dL 123             No results found for: \"HGBA1C\"        Imaging Results Review: No pertinent imaging studies reviewed.  Other Study Results Review: No additional pertinent studies reviewed.      ** Please Note: This note has been constructed using a voice recognition system. **         [1]   Past Medical History:  Diagnosis Date    Anxiety     CVA (cerebral vascular accident) ([facility]) 2011 - no residual 08/10/2023    Depression     ETOH abuse     40 days sober as of 8/10/23    Hematuria     Kidney stone     Nephrolithiasis with hydronephrosis 07/26/2023    Suicide attempt ([facility]) 06/13/2023   [2]   Past Surgical History:  Procedure Laterality Date    FINGER SURGERY  2008    infection of the finger    FRACTURE SURGERY Left     lower arm fx-plate    MANDIBLE FRACTURE SURGERY      screws    NC CYSTO/URETERO W/LITHOTRIPSY &INDWELL STENT INSRT Right 07/27/2023    Procedure: CYSTOSCOPY RIGHT URETEROSCOPY, STONE MANIPULATION, INSERTION STENT URETERAL;  Surgeon: Augusto Sims MD;  Location: [facility];  Service: Urology    NC CYSTO/URETERO W/LITHOTRIPSY &INDWELL STENT INSRT Right 8/10/2023    Procedure: CYSTOSCOPY, URETEROSCOPY WITH LITHOTRIPSY HOLMIUM LASER, STONE BASKET EXTRACTION, STENT URETERAL;  Surgeon: Augusto Sims MD;  Location: [facility];  Service: Urology   [3]   Social History  Tobacco Use    Smoking status: Every Day     Current packs/day: 0.50     Types: Cigarettes    Smokeless " tobacco: Never   Vaping Use    Vaping status: Never Used   Substance and Sexual Activity    Alcohol use: Yes     Alcohol/week: 6.0 standard drinks of alcohol     Types: 2 Glasses of wine, 4 Cans of beer per week     Comment: 2 pints of vodka or more with binge drinking -in recovery 40 days sober    Drug use: Not Currently     Types: Marijuana, Cocaine     Comment: early 20's- none since 2018   [4]   Family History  Problem Relation Name Age of Onset    Depression Mother      Heart disease Father          MI    Depression Father

## 2025-06-01 NOTE — ASSESSMENT & PLAN NOTE
Last drink: 5/31/25   Ethanol lvl: 247   Received Librium in ED  Toxicology consult; appreciate recommendations  Patient has minimal withdrawal symptoms with him and admitted to the withdrawal management unit secondary to patient only drinking alcohol for 2 days after a long period of sobriety.  Patient reports that he was afraid that he would have a withdrawal seizure as he had complications of withdrawal in the past.  He has been monitored on SEWS protocol overnight and has only received 65 mg of phenobarbital  We will discuss with toxicology as patient has minimal withdrawal of discontinuing protocol and referring patient to inpatient drug and alcohol rehab today.

## 2025-06-01 NOTE — ASSESSMENT & PLAN NOTE
Continue daily thiamine, folate, and multivitamin supplementation.    Patient is interested in restarting oral naltrexone; previously tolerated well. Denies recent opioid use.    Recommend CRS consult for recovery support.    Recommend case management consult for disposition planning; patient desires inpatient rehabilitation placement.

## 2025-06-01 NOTE — CONSULTS
Consultation - Medical Toxicology   Name: Bon Hazel 34 y.o. male I MRN: [id number]  Unit/Bed#: 5T DETOX 513-01 I Date of Admission: 5/31/2025   Date of Service: 6/1/2025 I Hospital Day: 1   Inpatient consult to Toxicology  Consult performed by: Brianna Baig MD  Consult ordered by: Meme Payne PA-C        Physician Requesting Evaluation: Declan Goyal DO   Reason for Evaluation / Principal Problem: Alcohol withdrawal, alcohol use disorder    Assessment & Plan  Alcohol withdrawal syndrome with complication (HCC)  Continue supportive care measures, including airway monitoring, head of bed elevation, aspiration precautions, cardiac telemetry, and continuous pulse oximetry.    Patient reports drinking alcohol for 3 days prior to ED presentation but previously was abstinent for about 30 days. Low risk of complicated withdrawal.    Has received 1 dose of phenobarbital but is without objective withdrawal symptoms. Will monitor clinically off SEWS today.  Alcohol use disorder, severe, dependence (HCC)  Continue daily thiamine, folate, and multivitamin supplementation.    Patient is interested in restarting oral naltrexone; previously tolerated well. Denies recent opioid use.    Recommend CRS consult for recovery support.    Recommend case management consult for disposition planning; patient desires inpatient rehabilitation placement.  Tobacco abuse  Nicotine replacement therapy recommended  Anxiety and depression  Home medications aside from bupropion continued by primary team  I have discussed the above management plan in detail with the primary service.   Medical Toxicology service signing off.    Please see additional teaching note below (if available):    Ethanol Withdrawal Discussion  Sudden discontinuation of chronic ethanol use can precipitate a severe withdrawal syndrome that include symptoms tremor, anxiety, headache, palpitations, insomnia, nausea and vomiting, tachycardia and  "hypertension. Seizures may also occur, generally within 6-12 hours after cessation of ethanol use.  high-dose benzodiazepines are necessary for treatment. As a results of chronic ethanol abuse, GABAa receptors upregulate along with NMDA receptors, causing patient's to not react as strongly to benzodiazepines as the non ethanol dependent patient. Sympathetic nervous system overactivity may progress to delirium tremens.  Delirium tremens is a life-threatening condition that is characterized by tachycardia, diaphoresis, hyperthermia, delirium and hallucinations.  It usually occurs about 48-72 hours after discontinuation of heavy ethanol use.  If not treated appropriately, significant morbidity and mortality can be associated.    For further questions, please contact the medical  on call via SecureChat between 8am and 9pm. If between 9pm and 8am, please reach out to the [facility] at [phone].     History of Present Illness   Bon Hazel is a 34 y.o. year old male who presents with request for medically supervised alcohol withdrawal management. Patient completed inpatient rehabilitation and has been at a recovery house for 3 months. States 35 days ago he drank alcohol and that was his \"second strike.\" Patient was abstinent from alcohol until 3 days ago. He drank 70% ethanol mouthwash and was discharged from his recovery house, and then he drank a liter of vodka over 2 days with last ethanol prior to ED evaluation. Presenting ethanol 242. Patient was previously on IM naltrexone and would like to restart via the PO route. Denies current other substance use (notes past use). Endorses continued tobacco use. Patient states he needs a higher level of care and is requesting inpatient rehabilitation placement. This morning, he endorses fatigue but denies other withdrawal symptoms. Reports history of withdrawal seizure many years ago.    Review of Systems   Constitutional:  Positive for fatigue. Negative " for diaphoresis.   Gastrointestinal:  Negative for nausea and vomiting.   Neurological:  Negative for seizures and headaches.   Psychiatric/Behavioral:  The patient is not nervous/anxious.        Historical Information   Medical History Review: I have reviewed the patient's PMH, PSH, Social History, Family History, Meds, and Allergies   Social History[1]  Family History[2]    Meds/Allergies   Prior to Admission medications    Medication Sig Start Date End Date Taking? Authorizing Provider   buPROPion (WELLBUTRIN XL) 150 mg 24 hr tablet Take 1 tablet (150 mg total) by mouth daily Do not start before July 29, 2023.  Patient taking differently: Take 300 mg by mouth every morning 7/29/23  Yes Luisa Torres MD   escitalopram (LEXAPRO) 20 mg tablet Take 20 mg by mouth in the morning.   Yes Historical Provider, MD   gabapentin (NEURONTIN) 600 MG tablet Take 600 mg by mouth in the morning and 600 mg in the evening and 600 mg before bedtime.   Yes Historical Provider, MD   lamoTRIgine (LaMICtal) 25 mg tablet Take 50 mg by mouth in the morning.   Yes Historical Provider, MD   QUEtiapine (SEROquel) 50 mg tablet Take 1 tablet (50 mg total) by mouth daily at bedtime as needed (Sleep)  Patient taking differently: Take 150 mg by mouth daily at bedtime 7/28/23  Yes Luisa Torres MD   disulfiram (ANTABUSE) 250 mg tablet Take 250 mg by mouth every morning 7/17/23 9/15/23  Historical Provider, MD   Current Medications[3]   Allergies[4]    Objective :  Temp:  [97.4 °F (36.3 °C)-98.1 °F (36.7 °C)] 98.1 °F (36.7 °C)  HR:  [] 60  BP: ()/(49-72) 95/51  Resp:  [14-20] 16  SpO2:  [92 %-95 %] 94 %  O2 Device: None (Room air)    No intake or output data in the 24 hours ending 06/01/25 1110    Physical Exam  Vitals and nursing note reviewed.   Constitutional:       General: He is not in acute distress.     Appearance: He is not ill-appearing or diaphoretic.   HENT:      Head: Normocephalic and atraumatic.      Nose: Nose  normal.      Mouth/Throat:      Mouth: Mucous membranes are moist.     Eyes:      General: No scleral icterus.    Pulmonary:      Effort: Pulmonary effort is normal. No respiratory distress.     Musculoskeletal:      Cervical back: Neck supple.     Neurological:      General: No focal deficit present.      Mental Status: He is alert and oriented to person, place, and time.      Comments: No intention tremor         Lab Results: I have reviewed the following results:  Results from last 7 days   Lab Units 06/01/25  0542 05/31/25  1313   WBC Thousand/uL 6.29 11.10*   HEMOGLOBIN g/dL 13.1 15.7   HEMATOCRIT % 38.9 44.7   PLATELETS Thousands/uL 201 253   SEGS PCT %  --  74   LYMPHO PCT %  --  19   MONO PCT %  --  6   EOS PCT %  --  1      Results from last 7 days   Lab Units 06/01/25  0542   POTASSIUM mmol/L 4.0   CHLORIDE mmol/L 108   CO2 mmol/L 27   BUN mg/dL 17   CREATININE mg/dL 1.06   CALCIUM mg/dL 8.1*   ALBUMIN g/dL 3.6   ALK PHOS U/L 64   ALT U/L 13   AST U/L 16   MAGNESIUM mg/dL 1.9      Results from last 7 days   Lab Units 05/31/25  1313   ETHANOL LVL mg/dL 247*     Imaging Results Review: No pertinent imaging studies reviewed.  Other Study Results Review: No additional pertinent studies reviewed.    Administrative Statements   I have spent a total time of 25 minutes in caring for this patient on the day of the visit/encounter including Risks and benefits of tx options, Instructions for management, Patient and family education, Importance of tx compliance, Risk factor reductions, Impressions, Counseling / Coordination of care, Documenting in the medical record, Obtaining or reviewing history  , and Communicating with other healthcare professionals .       [1]   Social History  Tobacco Use    Smoking status: Every Day     Current packs/day: 0.50     Types: Cigarettes    Smokeless tobacco: Never   Vaping Use    Vaping status: Never Used   Substance and Sexual Activity    Alcohol use: Yes     Alcohol/week: 6.0  standard drinks of alcohol     Types: 2 Glasses of wine, 4 Cans of beer per week     Comment: 2 pints of vodka or more with binge drinking -in recovery 40 days sober    Drug use: Not Currently     Types: Marijuana, Cocaine     Comment: early 20's- none since 2018   [2]   Family History  Problem Relation Name Age of Onset    Depression Mother      Heart disease Father          MI    Depression Father     [3]   Current Facility-Administered Medications:     acetaminophen (TYLENOL) tablet 650 mg, 650 mg, Oral, Q6H PRN, Meme Payne PA-C    escitalopram (LEXAPRO) tablet 20 mg, 20 mg, Oral, Daily, Rosy Ashby PA-C, 20 mg at 06/01/25 0830    gabapentin (NEURONTIN) capsule 600 mg, 600 mg, Oral, TID, Rosy Ashby PA-C, 600 mg at 06/01/25 0830    lamoTRIgine (LaMICtal) tablet 50 mg, 50 mg, Oral, Daily, Rosy Ashby PA-C, 50 mg at 06/01/25 0830    nicotine (NICODERM CQ) 21 mg/24 hr TD 24 hr patch 21 mg, 21 mg, Transdermal, Daily, Rosy Ashby PA-C, 21 mg at 05/31/25 2201    ondansetron (ZOFRAN) injection 4 mg, 4 mg, Intravenous, Q6H PRN, Meme Payne PA-C, 4 mg at 05/31/25 2237    QUEtiapine (SEROquel) tablet 150 mg, 150 mg, Oral, HS, Rosy Ashby PA-C  [4]   Allergies  Allergen Reactions    Iv Dye [Iodinated Contrast Media] Anaphylaxis     Hives, dyspnea    Tetanus-Diphth-Acell Pertussis Anaphylaxis     Hives, dyspnea

## 2025-06-01 NOTE — PROGRESS NOTES
Progress Note - Hospitalist   Name: Bon Hazel 34 y.o. male I MRN: [id number]  Unit/Bed#: 5T DETOX 513-01 I Date of Admission: 5/31/2025   Date of Service: 6/1/2025 I Hospital Day: 1    Assessment & Plan  Alcohol withdrawal syndrome with complication (HCC)  Last drink: 5/31/25   Ethanol lvl: 247   Received Librium in ED  Toxicology consult; appreciate recommendations  Patient has minimal withdrawal symptoms with him and admitted to the withdrawal management unit secondary to patient only drinking alcohol for 2 days after a long period of sobriety.  Patient reports that he was afraid that he would have a withdrawal seizure as he had complications of withdrawal in the past.  He has been monitored on SEWS protocol overnight and has only received 65 mg of phenobarbital  We will discuss with toxicology as patient has minimal withdrawal of discontinuing protocol and referring patient to inpatient drug and alcohol rehab today.    Alcohol use disorder, severe, dependence (HCC)  Pt with a h/o of daily alcohol use   Initial report was that patient drinks a quart of vodka daily  However upon admission patient states that he was recently discharged from recovery home and only drink on 5/30 and 5/31  Reports previous history of withdrawal complicated by seizures   Previously on antabuse  Interested in naltrexone states he was previously on it  Withdrawal management as above  Continue thiamine, folic acid, and MVI  Consult case management/CRS for assistance with aftercare resources - pt interested in inpatient drug and alcohol rehab upon discharge at this time   Anxiety and depression  Patient has home medication regimen of Wellbutrin 300 mg,  Lexapro 20 mg, Seroquel 150 mg, Gabapentin 600 mg TID, Lamictal 50 mg daily  Will hold Wellbutrin in the setting of alcohol withdrawal as it lowers seizure threshold and patient has hx of withdrawal seizures   Continue other psychiatric medication   Dehydration (Resolved: 6/1/2025)  E/b  hemoconcentration on CBC with Hgb 15.1, WBC 11.1  IVF hydration  Continue monitoring CBC   Tobacco abuse  Smokes 1/2 PPD  Nicotine patch 14 mg  Smoking cessation recommended    VTE Pharmacologic Prophylaxis: VTE Score: 0 Low Risk (Score 0-2) - Encourage Ambulation.    Mobility:   Basic Mobility Inpatient Raw Score: 24  JH-HLM Goal: 8: Walk 250 feet or more  JH-HLM Achieved: 7: Walk 25 feet or more  JH-HLM Goal achieved. Continue to encourage appropriate mobility.    Patient Centered Rounds: I performed bedside rounds with nursing staff today.   Discussions with Specialists or Other Care Team Provider: Case Management     Education and Discussions with Family / Patient: Patient declined call to .     Current Length of Stay: 1 day(s)  Current Patient Status: Inpatient   Certification Statement: The patient will continue to require additional inpatient hospital stay due to patient drug and alcohol placement  Discharge Plan: Anticipate discharge later today or tomorrow to inpatient psych.    Code Status: Level 1 - Full Code    Subjective   Patient seen and examined at bedside, resting comfortably.  He reports that he is not drinking a quart of vodka daily.  Instead reports that he was recently discharged from [facility] to a recovery house.  Relapse for only 2 days.  Last drink was 5/31.  States he tried to enter a rehab however due to his history of withdrawal seizures they declined him.  He is having no objective findings of withdrawal on exam vital signs are stable.  He is ultimately interested in returning to an inpatient drug and alcohol rehab.  Patient states he is also on Antabuse and naltrexone previously, wishes to resume naltrexone.    Objective :  Temp:  [97.4 °F (36.3 °C)-98.1 °F (36.7 °C)] 98.1 °F (36.7 °C)  HR:  [] 60  BP: ()/(49-72) 95/51  Resp:  [14-20] 16  SpO2:  [92 %-95 %] 94 %  O2 Device: None (Room air)    Body mass index is 26.91 kg/m².     Input and Output Summary (last 24  hours):   No intake or output data in the 24 hours ending 06/01/25 0821    Physical Exam  Vitals reviewed.   Constitutional:       General: He is not in acute distress.     Appearance: He is not diaphoretic.     Cardiovascular:      Rate and Rhythm: Normal rate and regular rhythm.      Heart sounds: No murmur heard.  Pulmonary:      Breath sounds: Normal breath sounds.     Neurological:      Mental Status: He is alert.           Lines/Drains:        Telemetry:  Telemetry Orders (From admission, onward)               24 Hour Telemetry Monitoring  Continuous x 24 Hours (Telem)        Expiring   Question:  Reason for 24 Hour Telemetry  Answer:  Alcohol withdrawal and CIWA >7, electrolyte abnormalities, abnormal ECG and/or heart disease                     Telemetry Reviewed: Normal Sinus Rhythm  Indication for Continued Telemetry Use: No indication for continued use. Will discontinue.                Lab Results: I have reviewed the following results:   Results from last 7 days   Lab Units 06/01/25  0542 05/31/25  1313   WBC Thousand/uL 6.29 11.10*   HEMOGLOBIN g/dL 13.1 15.7   HEMATOCRIT % 38.9 44.7   PLATELETS Thousands/uL 201 253   SEGS PCT %  --  74   LYMPHO PCT %  --  19   MONO PCT %  --  6   EOS PCT %  --  1     Results from last 7 days   Lab Units 06/01/25  0542   SODIUM mmol/L 141   POTASSIUM mmol/L 4.0   CHLORIDE mmol/L 108   CO2 mmol/L 27   BUN mg/dL 17   CREATININE mg/dL 1.06   ANION GAP mmol/L 6   CALCIUM mg/dL 8.1*   ALBUMIN g/dL 3.6   TOTAL BILIRUBIN mg/dL 0.64   ALK PHOS U/L 64   ALT U/L 13   AST U/L 16   GLUCOSE RANDOM mg/dL 99                       Recent Cultures (last 7 days):         Imaging Results Review: No pertinent imaging studies reviewed.  Other Study Results Review: EKG was reviewed.     Last 24 Hours Medication List:     Current Facility-Administered Medications:     acetaminophen (TYLENOL) tablet 650 mg, Q6H PRN    escitalopram (LEXAPRO) tablet 20 mg, Daily    gabapentin (NEURONTIN) capsule  600 mg, TID    lamoTRIgine (LaMICtal) tablet 50 mg, Daily    nicotine (NICODERM CQ) 21 mg/24 hr TD 24 hr patch 21 mg, Daily    ondansetron (ZOFRAN) injection 4 mg, Q6H PRN    QUEtiapine (SEROquel) tablet 150 mg, HS    sodium chloride 0.9 % infusion, Continuous, Last Rate: Stopped (06/01/25 0540)    Administrative Statements   Today, Patient Was Seen By: Meme Payne PA-C      **Please Note: This note may have been constructed using a voice recognition system.**

## 2025-06-01 NOTE — ASSESSMENT & PLAN NOTE
Outpatient Instructions for Monitored Anesthesia Care (MAC)    1. You will be released from the hospital in the care of a responsible adult who should remain with you for at least 6 hours.    2. You are at an increased risk for falls following anesthesia. Use care when changing from a lying to a sitting position. Use your assistive devices ( example: cane, walker or family member).    3. You must NOT drive a car, climb high places such as a ladder, or operate equipment such as electric knives,stoves etc...for at least 12 hours. If you are dizzy for longer than 24 hours, notify your doctor.    4. DO NOT drink any alcoholic beverages for at least 24 hours. Anesthesia may impair your judgement.    5. If you smoke, do not smoke alone due to increased risk of burns/fires.    6. DO NOT undertake any legally binding commitment for at least 24 hours. Anesthesia may impair your judgement.     Pt with a h/o of daily alcohol use   Drinks a quart of vodka daily  Reports previous history of withdrawal complicated by seizures   Previously on antabuse  Withdrawal management as above  Initiate IVFs, IV thiamine, folic acid, and MVI  Consult case management/CRS for assistance with aftercare resources - pt interested in inpatient drug and alcohol rehab upon discharge at this time

## 2025-06-01 NOTE — PROGRESS NOTES
06/01/25 1459   Referral Data   Referral Source Patient   Referral Name Pt stated he called police for help. He initially presented to [facility] ED.   Referral Reason Drug/Alcohol Abuse   County Information   County of Residence [place], [place]   Readmission Root Cause   30 Day Readmission No   Patient Information   Mental Status Alert   Primary Caregiver Self   Accompanied by/Relationship n/a   Support System Immediate family;Other (Comment)  (sponsor)   Legal Information   Legal Issues Pt stated he had a DUI over a year ago. Denied any current issues.   Health Care Proxy Appointed No   Activities of Daily Living Prior to Admission   Functional Status Independent   Assistive Device No device needed   Living Arrangement Homeless  (Pt was at a Recovery House, was asked to leave after a relapse.)   Ambulation Independent   Access to Firearms   Access to Firearms No  (Pt denied any access to firearms)   Income Information   Income Source   (Pt currently has no income.)   Means of Transportation   Means of Transport to Hospitals in [place]: [facility]

## 2025-06-01 NOTE — SOCIAL WORK
CM completed assessment with pt. Pt signed NARESH and records faxed to [facility], [phone], as requested. Records are being reviewed.

## 2025-06-02 VITALS
WEIGHT: 198.4 LBS | HEIGHT: 72 IN | TEMPERATURE: 98.2 F | BODY MASS INDEX: 26.87 KG/M2 | DIASTOLIC BLOOD PRESSURE: 79 MMHG | OXYGEN SATURATION: 93 % | SYSTOLIC BLOOD PRESSURE: 123 MMHG | RESPIRATION RATE: 18 BRPM | HEART RATE: 68 BPM

## 2025-06-02 PROCEDURE — 99239 HOSP IP/OBS DSCHRG MGMT >30: CPT

## 2025-06-02 PROCEDURE — NC001 PR NO CHARGE

## 2025-06-02 RX ORDER — GABAPENTIN 600 MG/1
600 TABLET ORAL 3 TIMES DAILY
Qty: 42 TABLET | Refills: 0 | Status: SHIPPED | OUTPATIENT
Start: 2025-06-02 | End: 2025-06-02

## 2025-06-02 RX ORDER — QUETIAPINE FUMARATE 50 MG/1
150 TABLET, FILM COATED ORAL
Qty: 42 TABLET | Refills: 0 | Status: SHIPPED | OUTPATIENT
Start: 2025-06-02 | End: 2025-06-16

## 2025-06-02 RX ORDER — NALTREXONE HYDROCHLORIDE 50 MG/1
25 TABLET, FILM COATED ORAL ONCE
Status: COMPLETED | OUTPATIENT
Start: 2025-06-02 | End: 2025-06-02

## 2025-06-02 RX ORDER — LAMOTRIGINE 25 MG/1
50 TABLET ORAL DAILY
Qty: 28 TABLET | Refills: 0 | Status: SHIPPED | OUTPATIENT
Start: 2025-06-02 | End: 2025-06-02

## 2025-06-02 RX ORDER — NALTREXONE HYDROCHLORIDE 50 MG/1
50 TABLET, FILM COATED ORAL DAILY
Qty: 30 TABLET | Refills: 0 | Status: SHIPPED | OUTPATIENT
Start: 2025-06-03

## 2025-06-02 RX ORDER — BUPROPION HYDROCHLORIDE 150 MG/1
300 TABLET ORAL EVERY MORNING
Qty: 28 TABLET | Refills: 0 | Status: SHIPPED | OUTPATIENT
Start: 2025-06-02 | End: 2025-06-16

## 2025-06-02 RX ORDER — QUETIAPINE FUMARATE 50 MG/1
150 TABLET, FILM COATED ORAL
Qty: 42 TABLET | Refills: 0 | Status: SHIPPED | OUTPATIENT
Start: 2025-06-02 | End: 2025-06-02

## 2025-06-02 RX ORDER — LAMOTRIGINE 25 MG/1
50 TABLET ORAL DAILY
Qty: 28 TABLET | Refills: 0 | Status: SHIPPED | OUTPATIENT
Start: 2025-06-02 | End: 2025-06-16

## 2025-06-02 RX ORDER — BUPROPION HYDROCHLORIDE 150 MG/1
300 TABLET ORAL EVERY MORNING
Qty: 28 TABLET | Refills: 0 | Status: SHIPPED | OUTPATIENT
Start: 2025-06-02 | End: 2025-06-02

## 2025-06-02 RX ORDER — GABAPENTIN 600 MG/1
600 TABLET ORAL 3 TIMES DAILY
Qty: 42 TABLET | Refills: 0 | Status: SHIPPED | OUTPATIENT
Start: 2025-06-02 | End: 2025-06-16

## 2025-06-02 RX ORDER — ESCITALOPRAM OXALATE 20 MG/1
20 TABLET ORAL DAILY
Qty: 14 TABLET | Refills: 0 | Status: SHIPPED | OUTPATIENT
Start: 2025-06-02

## 2025-06-02 RX ORDER — NICOTINE 21 MG/24HR
1 PATCH, TRANSDERMAL 24 HOURS TRANSDERMAL DAILY
Qty: 28 PATCH | Refills: 0 | Status: SHIPPED | OUTPATIENT
Start: 2025-06-03 | End: 2025-06-02

## 2025-06-02 RX ORDER — NALTREXONE HYDROCHLORIDE 50 MG/1
50 TABLET, FILM COATED ORAL DAILY
Status: DISCONTINUED | OUTPATIENT
Start: 2025-06-03 | End: 2025-06-02 | Stop reason: HOSPADM

## 2025-06-02 RX ORDER — NICOTINE 21 MG/24HR
1 PATCH, TRANSDERMAL 24 HOURS TRANSDERMAL DAILY
Qty: 30 PATCH | Refills: 0 | Status: SHIPPED | OUTPATIENT
Start: 2025-06-03

## 2025-06-02 RX ORDER — NALTREXONE HYDROCHLORIDE 50 MG/1
50 TABLET, FILM COATED ORAL DAILY
Qty: 30 TABLET | Refills: 0 | Status: SHIPPED | OUTPATIENT
Start: 2025-06-03 | End: 2025-06-02

## 2025-06-02 RX ORDER — ESCITALOPRAM OXALATE 20 MG/1
20 TABLET ORAL DAILY
Qty: 14 TABLET | Refills: 0 | Status: SHIPPED | OUTPATIENT
Start: 2025-06-02 | End: 2025-06-02

## 2025-06-02 RX ADMIN — NICOTINE 21 MG: 21 PATCH, EXTENDED RELEASE TRANSDERMAL at 08:08

## 2025-06-02 RX ADMIN — NALTREXONE HYDROCHLORIDE 25 MG: 50 TABLET, FILM COATED ORAL at 11:43

## 2025-06-02 RX ADMIN — GABAPENTIN 600 MG: 300 CAPSULE ORAL at 16:01

## 2025-06-02 RX ADMIN — LAMOTRIGINE 50 MG: 25 TABLET ORAL at 08:06

## 2025-06-02 RX ADMIN — ESCITALOPRAM OXALATE 20 MG: 10 TABLET ORAL at 08:07

## 2025-06-02 RX ADMIN — GABAPENTIN 600 MG: 300 CAPSULE ORAL at 08:06

## 2025-06-02 NOTE — ASSESSMENT & PLAN NOTE
Patient has home medication regimen of Wellbutrin 300 mg,  Lexapro 20 mg, Seroquel 150 mg, Gabapentin 600 mg TID, Lamictal 50 mg daily  Will hold Wellbutrin in the setting of alcohol withdrawal as it lowers seizure threshold and patient has hx of withdrawal seizures, restart on discharge  Continue other psychiatric medication

## 2025-06-02 NOTE — PROGRESS NOTES
Progress Note - Hospitalist   Name: Bon Hazel 34 y.o. male I MRN: [id number]  Unit/Bed#: 5T DETOX 513-01 I Date of Admission: 5/31/2025   Date of Service: 6/2/2025 I Hospital Day: 2    Assessment & Plan  Alcohol withdrawal syndrome with complication (HCC)  Last drink: 5/31/25   Ethanol lvl: 247   Received Librium in ED  Toxicology consult; appreciate recommendations  Patient has minimal withdrawal symptoms with him and admitted to the withdrawal management unit secondary to patient only drinking alcohol for 2 days after a long period of sobriety.  Patient reports that he was afraid that he would have a withdrawal seizure as he had complications of withdrawal in the past.  He has been monitored on SEWS protocol overnight and has only received 65 mg of phenobarbital  Patient stable for discharge, awaiting inpatient rehab placement  Alcohol use disorder, severe, dependence (HCC)  Pt with a h/o of daily alcohol use   Initial report was that patient drinks a quart of vodka daily  However upon admission patient states that he was recently discharged from recovery home and only drink on 5/30 and 5/31  Reports previous history of withdrawal complicated by seizures   Previously on antabuse  Interested in naltrexone states he was previously on it, ordered to start 6/2 with 25 mg then 50 mg daily starting 6/3  Withdrawal management as above  Continue thiamine, folic acid, and MVI  Consult case management/CRS for assistance with aftercare resources - pt interested in inpatient drug and alcohol rehab upon discharge at this time   Anxiety and depression  Patient has home medication regimen of Wellbutrin 300 mg,  Lexapro 20 mg, Seroquel 150 mg, Gabapentin 600 mg TID, Lamictal 50 mg daily  Will hold Wellbutrin in the setting of alcohol withdrawal as it lowers seizure threshold and patient has hx of withdrawal seizures, restart on discharge  Continue other psychiatric medication   Tobacco abuse  Smokes 1/2 PPD  Nicotine patch  14 mg  Smoking cessation recommended    VTE Pharmacologic Prophylaxis: VTE Score: 0 Low Risk (Score 0-2) - Encourage Ambulation.    Mobility:   Basic Mobility Inpatient Raw Score: 24  JH-HLM Goal: 8: Walk 250 feet or more  JH-HLM Achieved: 7: Walk 25 feet or more  JH-HLM Goal NOT achieved. Continue with multidisciplinary rounding and encourage appropriate mobility to improve upon JH-HLM goals.    Patient Centered Rounds: I performed bedside rounds with nursing staff today.   Discussions with Specialists or Other Care Team Provider: Case management, toxicology    Education and Discussions with Family / Patient: Confidential encounter.     Current Length of Stay: 2 day(s)  Current Patient Status: Inpatient   Certification Statement: The patient will continue to require additional inpatient hospital stay due to pending inpatient rehab placement  Discharge Plan: Anticipate discharge later today or tomorrow to rehab facility.    Code Status: Level 1 - Full Code    Subjective   Denies pain, constipation, or diarrhea.  No questions or concerns at this time.    Objective :  Temp:  [97.6 °F (36.4 °C)-98.2 °F (36.8 °C)] 98.2 °F (36.8 °C)  HR:  [59-78] 59  BP: (107-124)/(48-67) 109/60  Resp:  [16-18] 16  SpO2:  [92 %-96 %] 96 %  O2 Device: None (Room air)    Body mass index is 26.91 kg/m².     Input and Output Summary (last 24 hours):     Intake/Output Summary (Last 24 hours) at 6/2/2025 1026  Last data filed at 6/1/2025 2147  Gross per 24 hour   Intake 680 ml   Output --   Net 680 ml       Physical Exam  Vitals and nursing note reviewed.   Constitutional:       General: He is not in acute distress.     Appearance: Normal appearance. He is not ill-appearing.   HENT:      Head: Normocephalic.      Nose: Nose normal.      Mouth/Throat:      Mouth: Mucous membranes are moist.      Pharynx: Oropharynx is clear.     Eyes:      Conjunctiva/sclera: Conjunctivae normal.       Cardiovascular:      Rate and Rhythm: Normal rate and  regular rhythm.      Pulses: Normal pulses.      Heart sounds: Normal heart sounds. No murmur heard.  Pulmonary:      Effort: Pulmonary effort is normal. No respiratory distress.      Breath sounds: Normal breath sounds. No wheezing or rhonchi.   Abdominal:      General: Bowel sounds are normal. There is no distension.      Palpations: Abdomen is soft.      Tenderness: There is no abdominal tenderness. There is no guarding.     Skin:     General: Skin is warm and dry.     Neurological:      General: No focal deficit present.      Mental Status: He is alert. Mental status is at baseline.     Psychiatric:         Mood and Affect: Mood normal.         Thought Content: Thought content normal.           Lines/Drains:              Lab Results: I have reviewed the following results:   Results from last 7 days   Lab Units 06/01/25  0542 05/31/25  1313   WBC Thousand/uL 6.29 11.10*   HEMOGLOBIN g/dL 13.1 15.7   HEMATOCRIT % 38.9 44.7   PLATELETS Thousands/uL 201 253   SEGS PCT %  --  74   LYMPHO PCT %  --  19   MONO PCT %  --  6   EOS PCT %  --  1     Results from last 7 days   Lab Units 06/01/25  0542   SODIUM mmol/L 141   POTASSIUM mmol/L 4.0   CHLORIDE mmol/L 108   CO2 mmol/L 27   BUN mg/dL 17   CREATININE mg/dL 1.06   ANION GAP mmol/L 6   CALCIUM mg/dL 8.1*   ALBUMIN g/dL 3.6   TOTAL BILIRUBIN mg/dL 0.64   ALK PHOS U/L 64   ALT U/L 13   AST U/L 16   GLUCOSE RANDOM mg/dL 99                       Recent Cultures (last 7 days):         Imaging Results Review: No pertinent imaging studies reviewed.  Other Study Results Review: No additional pertinent studies reviewed.    Last 24 Hours Medication List:     Current Facility-Administered Medications:     acetaminophen (TYLENOL) tablet 650 mg, Q6H PRN    escitalopram (LEXAPRO) tablet 20 mg, Daily    gabapentin (NEURONTIN) capsule 600 mg, TID    hydrOXYzine HCL (ATARAX) tablet 50 mg, Q6H PRN    lamoTRIgine (LaMICtal) tablet 50 mg, Daily    melatonin tablet 3 mg, HS    nicotine  (NICODERM CQ) 21 mg/24 hr TD 24 hr patch 21 mg, Daily    ondansetron (ZOFRAN) injection 4 mg, Q6H PRN    QUEtiapine (SEROquel) tablet 150 mg, HS    Administrative Statements   Today, Patient Was Seen By: ANTONIO Morataya      **Please Note: This note may have been constructed using a voice recognition system.**

## 2025-06-02 NOTE — ASSESSMENT & PLAN NOTE
Last drink: 5/31/25   Ethanol lvl: 247   Received Librium in ED  Toxicology consult; appreciate recommendations  Patient has minimal withdrawal symptoms with him and admitted to the withdrawal management unit secondary to patient only drinking alcohol for 2 days after a long period of sobriety.  Patient reports that he was afraid that he would have a withdrawal seizure as he had complications of withdrawal in the past.  He has been monitored on SEWS protocol overnight and has only received 65 mg of phenobarbital  Patient stable for discharge, awaiting inpatient rehab placement

## 2025-06-02 NOTE — DISCHARGE INSTR - AVS FIRST PAGE
Dear Bon Hazel,     It was our pleasure to care for you here at [facility].  It is our hope that we were always able to exceed the expected standards for your care during your stay.  You were hospitalized due to alcohol withdrawal.  You were cared for on the fifth floor by ANTONIO Morataya under the service of Rachel Moralez DO with the [facility] who covers for your primary care physician (PCP), Radhika Romero MD, while you were hospitalized.  If you have any questions or concerns related to this hospitalization, you may contact us at .  For follow up as well as any medication refills, we recommend that you follow up with your primary care physician.  A registered nurse will reach out to you by phone within a few days after your discharge to answer any additional questions that you may have after going home.  However, at this time we provide for you here, the most important instructions / recommendations at discharge:     Notable Medication Adjustments -   Start taking melatonin 3 mg daily at bedtime for insomnia  Start taking naltrexone (ReVia) 50 mg daily for alcohol cessation  Start taking nicotine patch daily for smoking cessation, do not use more than 1 patch at a time  Stop taking disulfiram (Antabuse) daily  Testing Required after Discharge -   None  ** Please contact your PCP to request testing orders for any of the testing recommended here **  Important follow up information -   Please follow-up with PCP in 2 to 4 weeks  Please follow-up with [facility] office as needed  Other Instructions -   Please continue with alcohol cessation  Please review this entire after visit summary as additional general instructions including medication list, appointments, activity, diet, any pertinent wound care, and other additional recommendations from your care team that may be provided for you.      Sincerely,     ANTONIO Morataya

## 2025-06-02 NOTE — DISCHARGE SUMMARY
[facility]  Discharge Summary  Name: Bon Hazel I MRN: [id number]  Unit/Bed#: 5T DETOX 513-01I Date of Admission: 5/31/2025   Date of Service: 5/31/2025  I Hospital Day: 2    Tobacco abuse  Assessment & Plan  Smokes 1/2 PPD  Nicotine patch 14 mg  Smoking cessation recommended    Alcohol use disorder, severe, dependence (HCC)  Assessment & Plan  Pt with a h/o of daily alcohol use   Initial report was that patient drinks a quart of vodka daily  However upon admission patient states that he was recently discharged from recovery home and only drink on 5/30 and 5/31  Reports previous history of withdrawal complicated by seizures   Previously on antabuse  Interested in naltrexone states he was previously on it, ordered to start 6/2 with 25 mg then 50 mg daily starting 6/3  Withdrawal management as above  Continue thiamine, folic acid, and MVI  Consult case management/CRS for assistance with aftercare resources - pt interested in inpatient drug and alcohol rehab upon discharge at this time     Anxiety and depression  Assessment & Plan  Patient has home medication regimen of Wellbutrin 300 mg,  Lexapro 20 mg, Seroquel 150 mg, Gabapentin 600 mg TID, Lamictal 50 mg daily  Will hold Wellbutrin in the setting of alcohol withdrawal as it lowers seizure threshold and patient has hx of withdrawal seizures, restart on discharge  Continue other psychiatric medication     * Alcohol withdrawal syndrome with complication (HCC)  Assessment & Plan  Last drink: 5/31/25   Ethanol lvl: 247   Received Librium in ED  Toxicology consult; appreciate recommendations  Patient has minimal withdrawal symptoms with him and admitted to the withdrawal management unit secondary to patient only drinking alcohol for 2 days after a long period of sobriety.  Patient reports that he was afraid that he would have a withdrawal seizure as he had complications of withdrawal in the past.  He has been [facility]  overnight and has only received 65 mg of phenobarbital  Patient stable for discharge, awaiting inpatient rehab placement         Medical Problems       Resolved Problems  Date Reviewed: 5/31/2025          Resolved    Dehydration 6/1/2025     Resolved by  Meme Payne PA-C        Discharging Physician / Practitioner: ANTONIO Morataya  PCP: Radhika Romero MD  Admission Date:   Admission Orders (From admission, onward)       Ordered        05/31/25 2150  Inpatient Admission  Once            05/31/25 2117  INPATIENT ADMISSION  Once                          Discharge Date: 06/02/25    Consultations During Hospital Stay:  IP CONSULT TO TOXICOLOGY    Procedures Performed:   None    Significant Findings / Test Results:   No results found.    Incidental Findings:   None    Test Results Pending at Discharge (will require follow up):   None     Outpatient Tests Requested:  None    Complications:  None    Reason for Admission: alcohol withdrawal    Hospital Course:   Bon Hazel is a 34 y.o. male patient with  has a past medical history of Anxiety, CVA (cerebral vascular accident) (HCC) 2011 - no residual (08/10/2023), Depression, ETOH abuse, Hematuria, Kidney stone, Nephrolithiasis with hydronephrosis (07/26/2023), and Suicide attempt (HCC) (06/13/2023). who originally presented to the hospital on 5/31/2025 due to alcohol withdrawal. Patient initially presented to the [facility] ED requesting detoxification from alcohol. Patient was admitted to the [facility] medical detox unit under [facility] protocol for medically assisted alcohol withdrawal and received a total of 130 mg phenobarbital without complication, with last dose of phenobarbital administered 6/1 0600. Patient's alcohol withdrawal symptoms subsequently resolved, and he has remained without objective evidence of alcohol withdrawal at this time.  Case management and CRS were consulted for assistance with aftercare resources, and patient will be discharged to  inpatient rehab.     Please see above list of diagnoses and related plan for additional information.     Condition at Discharge: good    Discharge Day Visit / Exam:   * Please refer to separate progress note for these details *    Discussion with Family: Confidential encounter.     Discharge instructions/Information to patient and family:   See after visit summary for information provided to patient and family.      Provisions for Follow-Up Care:  See after visit summary for information related to follow-up care and any pertinent home health orders.      Mobility at time of Discharge:   Basic Mobility Inpatient Raw Score: 24  JH-HLM Goal: 8: Walk 250 feet or more  JH-HLM Achieved: 7: Walk 25 feet or more  HLM Goal NOT achieved. Continue to encourage mobility in post discharge setting.     Disposition:   Other: Inpatient alcohol rehab    Planned Readmission: None     Discharge Statement:  I spent 38 minutes discharging the patient. This time was spent on the day of discharge. I had direct contact with the patient on the day of discharge. Greater than 50% of the total time was spent examining patient, answering all patient questions, arranging and discussing plan of care with patient as well as directly providing post-discharge instructions.  Additional time then spent on discharge activities.    Discharge Medications:  See after visit summary for reconciled discharge medications provided to patient and/or family.      **Please Note: This note may have been constructed using a voice recognition system**

## 2025-06-02 NOTE — CERTIFIED RECOVERY SPECIALIST
"   Certified  Note      Name: Bon Hazel 34 y.o. male I MRN: [id number]  Unit/Bed#: 5T DETOX 513-01 I Date of Admission: 5/31/2025   Date of Service: 6/2/2025 I Hospital Day: 2    Summary of Contact   CRS met with patient to offer support. Patient was forthcoming that he is tired of trying to \"get it\" and just wants to drink. CRS encouraged patient to allow time for the naltrexone to work on his cravings and to reach out to others in his support system. Patient is going to [facility] in [place] for treatment and is on his medications again, and is hopeful to     Follow up: Patient discharging                       '      Administrative Statements  I have spent a total time of 15 minutes in caring for this patient on the day of the visit/encounter.    Hayley Calderon       "

## 2025-06-02 NOTE — ASSESSMENT & PLAN NOTE
Pt with a h/o of daily alcohol use   Initial report was that patient drinks a quart of vodka daily  However upon admission patient states that he was recently discharged from recovery home and only drink on 5/30 and 5/31  Reports previous history of withdrawal complicated by seizures   Previously on antabuse  Interested in naltrexone states he was previously on it, ordered to start 6/2 with 25 mg then 50 mg daily starting 6/3  Withdrawal management as above  Continue thiamine, folic acid, and MVI  Consult case management/CRS for assistance with aftercare resources - pt interested in inpatient drug and alcohol rehab upon discharge at this time

## 2025-06-02 NOTE — PLAN OF CARE
Problem: PAIN - ADULT  Goal: Verbalizes/displays adequate comfort level or baseline comfort level  Description: Interventions:  - Encourage patient to monitor pain and request assistance  - Assess pain using appropriate pain scale  - Administer analgesics as ordered based on type and severity of pain and evaluate response  - Implement non-pharmacological measures as appropriate and evaluate response  - Consider cultural and social influences on pain and pain management  - Notify physician/advanced practitioner if interventions unsuccessful or patient reports new pain  - Educate patient/family on pain management process including their role and importance of  reporting pain   - Provide non-pharmacologic/complimentary pain relief interventions  Outcome: Progressing     Problem: DISCHARGE PLANNING  Goal: Discharge to home or other facility with appropriate resources  Description: INTERVENTIONS:  - Identify barriers to discharge w/patient and caregiver  - Arrange for needed discharge resources and transportation as appropriate  - Identify discharge learning needs (meds, wound care, etc.)  - Arrange for interpretive services to assist at discharge as needed  - Refer to Case Management Department for coordinating discharge planning if the patient needs post-hospital services based on physician/advanced practitioner order or complex needs related to functional status, cognitive ability, or social support system  Outcome: Progressing     Problem: Knowledge Deficit  Goal: Patient/family/caregiver demonstrates understanding of disease process, treatment plan, medications, and discharge instructions  Description: Complete learning assessment and assess knowledge base.  Interventions:  - Provide teaching at level of understanding  - Provide teaching via preferred learning methods  Outcome: Progressing     Problem: SUBSTANCE USE/ABUSE  Goal: By discharge, will develop insight into their chemical dependency and sustain  motivation to continue in recovery  Description: INTERVENTIONS:  - Attends all daily group sessions and scheduled AA groups  - Actively practices coping skills through participation in the therapeutic community and adherence to program rules  - Reviews and completes assignments from individual treatment plan  - Assist patient development of understanding of their personal cycle of addiction and relapse triggers  Outcome: Progressing  Goal: By discharge, patient will have ongoing treatment plan addressing chemical dependency  Description: INTERVENTIONS:  - Assist patient with resources and/or appointments for ongoing recovery based living  Outcome: Progressing     Problem: SAFETY ADULT  Goal: Patient will remain free of falls  Description: INTERVENTIONS:  - Educate patient/family on patient safety including physical limitations  - Instruct patient to call for assistance with activity   - Consider consulting OT/PT to assist with strengthening/mobility based on AM PAC & JH-HLM score  - Consult OT/PT to assist with strengthening/mobility   - Keep Call bell within reach  - Keep bed low and locked with side rails adjusted as appropriate  - Keep care items and personal belongings within reach  - Initiate and maintain comfort rounds  - Make Fall Risk Sign visible to staff  - Offer Toileting every 2 Hours, in advance of need  - Obtain necessary fall risk management equipment:   - Apply yellow socks and bracelet for high fall risk patients  - Consider moving patient to room near nurses station  Outcome: Progressing

## 2025-06-02 NOTE — UTILIZATION REVIEW
Initial Clinical Review    Pt initially presented to [facility] ED. Pt was transferred by EMS to [facility] for medically managed detox.    Admission: Date/Time/Statement:   Admission Orders (From admission, onward)       Ordered        05/31/25 2117  INPATIENT ADMISSION  Once                          Orders Placed This Encounter   Procedures    INPATIENT ADMISSION     Standing Status:   Standing     Number of Occurrences:   1     Level of Care:   Med Surg [16]     Estimated length of stay:   More than 2 Midnights     Certification:   I certify that inpatient services are medically necessary for this patient for a duration of greater than two midnights. See H&P and MD Progress Notes for additional information about the patient's course of treatment.     ED Arrival Information       Patient not seen in ED                       No chief complaint on file.      Initial Presentation: 34 y.o. male who presented to [facility]. Inpatient admission for evaluation and treatment of Alcohol withdrawal/ Detox & alcohol withdrawal syndrome. Pt previous history of withdrawal complicated by seizures. Pt reports he just left [facility], [facility] in [place], [place] on Friday he relapsed while he was there. He endorses drinking a Liter of vodka on 5/30 and a pint of vodka today. Last drink on 5/31/25. Serum Ethanol in the . Pt was previously on antabuse and reports having a period of sobriety with this medication.  He After medical detoxification patient is interested in inpatient drug and alcohol rehab. Transferred for withdrawal management for medical detoxification.   PMH for CVA 2011, anxiety and depression, tobacco abuse, alcohol dependence   Plan: IVF, telemetry, continuous pulse ox, trend labs, replete electrolytes as needed; I&O, fall & seizure precautions. Toxicology consulted.   Hold Wellbutrin. Continue other psychiatric meds. Continue monitoring Cbc.   Dehydration;  "hemoconcentration on CBC with Hgb 15.1, WBC 11.1   Follow SEWS protocol with symptom-triggered phenobarbital for medically-assisted alcohol withdrawal. Administer 65 mg phenobarbital + valium 10 mg IV   Given Librium in ED  Exam: Nausea/ vomiting and anxiety. Generalized abdominal discomfort       Date: 6/1       Day 2: SEWS 1. Plan: continue SEWS monitoring w/ phenobarbital management, IVFs, telemetry, continuous pulse ox, continue current meds, trend labs, replete electrolytes as needed. Received 65 mg phenobarbital since admission.  Pt initially l report was that patient drinks a quart of vodka daily  However upon admission patient states that he was recently discharged from recovery home and only drink on 5/30 and 5/31  Certification Statement: The patient will continue to require additional inpatient hospital stay due to patient drug and alcohol placement     Toxicology: Presented w/ need for detox from alcohol. Serum ETOH: 247. Reports 1L to 1 pint of Vodka daily, last drink on 5/31.  Positive hx of withdrawal seizures.   Monitor clinically off SEWS today. Continue thiamine, folate and MVI.   Pt is interested in restarting oral naltrexone; previously tolerated well.   Recommend CRS consult for recovery support   On exam, no intention tremor. SEWS 6. Plan: SEWS monitoring w/ phenobarbital management.  Pt completed inpatient rehabilitation and has been at a recovery house for 3 months. States 35 days ago he drank alcohol and that was his \"second strike.\" Patient was abstinent from alcohol until 3 days ago. He drank 70% ethanol mouthwash and was discharged from his recovery house, and then he drank a liter of vodka over 2 days with last ethanol prior to ED evaluation       Scheduled Medications:  escitalopram, 20 mg, Oral, Daily  gabapentin, 600 mg, Oral, TID  lamoTRIgine, 50 mg, Oral, Daily  melatonin, 3 mg, Oral, HS  [START ON 6/3/2025] naltrexone, 50 mg, Oral, Daily  nicotine, 21 mg, Transdermal, " Daily  QUEtiapine, 150 mg, Oral, HS      Continuous IV Infusions:   sodium chloride 0.9 % infusion  Rate: 125 mL/hr Dose: 125 mL/hr  Freq: Continuous Route: IV  Indications of Use: IV Hydration  Last Dose: Stopped (06/01/25 0540)  Start: 05/31/25 2215 End: 06/01/25 0822    sodium chloride 0.9 % infusion  Rate: 125 mL/hr Dose: 125 mL/hr  Freq: Continuous Route: IV  Indications of Use: IV Hydration  Last Dose: Stopped (05/31/25 2230)  Start: 05/31/25 2200 End: 05/31/25 2207    PRN Meds:  acetaminophen, 650 mg, Oral, Q6H PRN  hydrOXYzine HCL, 50 mg, Oral, Q6H PRN  ondansetron, 4 mg, Intravenous, Q6H PRN      phenobarbital, 65 mg, Intravenous; 5/31  phenobarbital, 64.8 mg, Oral; 6/1      Vitals [05/31/25 2134]   Temperature Pulse Respirations Blood Pressure SpO2 Pain Score   97.8 °F (36.6 °C) 84 16 120/69 93 % No Pain     Weight (last 2 days)       Date/Time Weight    05/31/25 2134 90 (198.4)              Vital Signs (last 3 days)       Date/Time Temp Pulse Resp BP MAP (mmHg) SpO2 O2 Device Patient Position - Orthostatic VS SEWS Total Score Pain    06/01/25 2025 -- -- -- -- -- -- -- -- -- No Pain    06/01/25 2009 -- -- -- 109/60 -- -- -- -- -- --    06/01/25 1930 97.6 °F (36.4 °C) 78 18 107/48 -- 96 % None (Room air) Lying -- --    06/01/25 1544 97.7 °F (36.5 °C) 76 16 118/67 -- 95 % None (Room air) Lying -- --    06/01/25 1120 97.8 °F (36.6 °C) 63 16 124/65 84 92 % None (Room air) Lying -- --    06/01/25 0800 -- -- -- -- -- -- -- -- 0 --    06/01/25 0756 -- -- -- -- -- -- -- -- -- No Pain    06/01/25 0707 98.1 °F (36.7 °C) 60 16 95/51 65 94 % None (Room air) Lying -- --    06/01/25 0553 -- -- -- -- -- -- -- -- 3 --    06/01/25 0541 97.4 °F (36.3 °C) 72 16 115/63 80 95 % None (Room air) Lying -- --    06/01/25 0200 98 °F (36.7 °C) 61 16 100/66 -- 92 % None (Room air) -- 0 --    05/31/25 2240 -- -- -- -- -- -- -- -- 0 --    05/31/25 2236 97.7 °F (36.5 °C) 91 16 101/49 -- 93 % -- -- -- --    05/31/25 2151 -- -- -- -- --  92 % None (Room air) -- -- --    05/31/25 2134 97.8 °F (36.6 °C) 84 16 120/69 86 93 % None (Room air) -- 6 No Pain            SEWS:   RUDDY    Row Name 06/01/25 0800 06/01/25 0553 06/01/25 0200 05/31/25 2240 05/31/25 2134   Severity of Ethanol Withdrawal Scale (SEWS)   ANXIETY: Do you feel that something bad is about to happen to you right now? 0  -BH 3  -TS 0  -TS 0  -TS 3  -TS   NAUSEA and DRY HEAVES or VOMITING? 0  -BH 0  -TS 0  -TS 0  -TS 3  -TS   SWEATING: (includes moist palms, sweating now)? Score 0 or 2 0  -BH 0  -TS 0  -TS 0  -TS 0  -TS   TREMOR: with arms extended eyes closed? 0  -BH 0  -TS 0  -TS 0  -TS 0  -TS   AGITATION: Fidgety, restless, pacing? 0  -BH 0  -TS 0  -TS 0  -TS 0  -TS   DISORIENTATION: 0  -BH 0  -TS 0  -TS 0  -TS 0  -TS   HALLUCINATIONS: 0  -BH 0  -TS 0  -TS 0  -TS 0  -TS   VITAL SIGNS: ANY (Pulse >110, Diastolic BP >90, Temp >99.6) 0  -BH 0  -TS 0  -TS 0  -TS 0  -TS   SEWS Total Score 0  -BH 3  -TS 0  -TS 0  -TS 6  -TS   Simental Agitation Sedation Scale (RASS)   Simental Agitation Sedation Scale (RASS) -1  -BH -1  -TS -- -2  -TS 0  -TS       Pertinent Labs/Diagnostic Test Results:   Radiology:  No orders to display     Cardiology:  No orders to display     GI:  No orders to display         Results from last 7 days   Lab Units 06/01/25  0542 05/31/25  1313   WBC Thousand/uL 6.29 11.10*   HEMOGLOBIN g/dL 13.1 15.7   HEMATOCRIT % 38.9 44.7   PLATELETS Thousands/uL 201 253   TOTAL NEUT ABS Thousands/µL  --  8.24*         Results from last 7 days   Lab Units 06/01/25  0542 05/31/25  1313   SODIUM mmol/L 141 140   POTASSIUM mmol/L 4.0 3.6   CHLORIDE mmol/L 108 105   CO2 mmol/L 27 23   ANION GAP mmol/L 6 12   BUN mg/dL 17 17   CREATININE mg/dL 1.06 1.16   EGFR ml/min/1.73sq m 89 80   CALCIUM mg/dL 8.1* 9.0   MAGNESIUM mg/dL 1.9  --      Results from last 7 days   Lab Units 06/01/25  0542 05/31/25  1313   AST U/L 16 19   ALT U/L 13 19   ALK PHOS U/L 64 86   TOTAL PROTEIN g/dL 5.9* 7.5   ALBUMIN  g/dL 3.6 4.5   TOTAL BILIRUBIN mg/dL 0.64 0.50         Results from last 7 days   Lab Units 06/01/25  0542 05/31/25  1313   GLUCOSE RANDOM mg/dL 99 123       Results from last 7 days   Lab Units 05/31/25  1315   CLARITY UA  Clear   COLOR UA  Yellow   SPEC GRAV UA  1.015   PH UA  5.5   GLUCOSE UA mg/dl Negative   KETONES UA mg/dl Trace*   BLOOD UA  Negative   PROTEIN UA mg/dl Negative   NITRITE UA  Negative   BILIRUBIN UA  Negative   UROBILINOGEN UA (BE) mg/dl <2.0   LEUKOCYTES UA  Negative   WBC UA /hpf None Seen   RBC UA /hpf 0-1*   BACTERIA UA /hpf None Seen   EPITHELIAL CELLS WET PREP /hpf Occasional             Results from last 7 days   Lab Units 05/31/25  1315   AMPH/METH  Negative   BARBITURATE UR  Negative   BENZODIAZEPINE UR  Negative   COCAINE UR  Negative   METHADONE URINE  Negative   OPIATE UR  Negative   PCP UR  Negative   THC UR  Negative     Results from last 7 days   Lab Units 05/31/25  1313   ETHANOL LVL mg/dL 247*         Past Medical History[1]  Present on Admission:   Alcohol withdrawal syndrome with complication (HCC)   Alcohol use disorder, severe, dependence (HCC)   Anxiety and depression   (Resolved) Dehydration   Tobacco abuse      Admitting Diagnosis: Alcohol intoxication (HCC) [F10.929]  Age/Sex: 34 y.o. male      SEWS PHENOBARBITAL PROTOCOL FOR ALCOHOL WITHDRAWAL  Admit patient to medical detox unit and continue supportive care and stabilization of acute ethanol withdrawal per medical toxicology/detox treatment pathway. Monitor ethanol withdrawal severity via the Severity of Ethanol Withdrawal Scale (SEWS) Q4 hours and then hourly if/when SEWS > 6. Treat withdrawal per pathway and reassess Q30-60 minutes.          Mild SEWS Score 1-6  Administer medications* (IV or PO; PO preferred):  If initial SEWS score: diazepam 10mg PO/IV x 1 AND phenobarbital 65 mg PO/IV x 1  If repeat SEWS score 1-6: phenobarbital 65 mg PO/IV q1 hour x 5 doses maximum   Reassessment:   SEWS q1 hour after each dose  until SEWS 0 x 2 hours  VS q1 hours (until SEWS 0, then q4 hours)  Notify provider for bedside evaluation if 5-dose maximum is reached, RASS of -3 to -5, or SEWS score escalates to moderate or severe.   Moderate SEWS Score 7-12  Administer medications* (IV):  If initial SEWS score: diazepam 10mg IV x 1 AND phenobarbital 260 mg IV x 1  If repeat SEWS score 7-12 or score escalated from mild: phenobarbital 130 mg IV q30 minutes x 5 doses maximum   Reassessment:  SEWS q30 minutes after each dose until SEWS < 7 (then hourly until SEWS 0 x 2 hours)  VS q30 minutes until SEWS < 7 (then hourly until SEWS 0, then q4 hours)  Notify provider for bedside evaluation if 5-dose maximum is reached, RASS of -3 to -5, or SEWS score escalates to severe.   Severe SEWS Score >= 13  Administer medications* (IV):  If initial SEWS score: Diazepam 10 mg IV x 1 AND phenobarbital 650 mg IV piggyback x 1 over 15-30 minutes  If repeat SEWS score >= 13 or score escalated from mild or moderate: phenobarbital 130 mg IV q30 minutes x 5 doses maximum   Reassessment:  SEWS q30 minutes after each dose until SEWS < 7 (then hourly until SEWS 0 x 2 hours)   VS q30 minutes until SEWS < 7 (then hourly until SEWS 0, then q4 hours)  Notify provider for bedside evaluation if 5-dose maximum is reached or RASS of -3 to -5   *Hold medications and notify provider if CNS depression, respirations < 10/min, or RASS of -3 to -5.        Network Utilization Review Department  ATTENTION: Please call with any questions or concerns to [phone] and carefully listen to the prompts so that you are directed to the right person. All voicemails are confidential.   For Discharge needs, contact Care Management DC Support Team at [phone] opt. 2  Send all requests for admission clinical reviews, approved or denied determinations and any other requests to dedicated fax number below belonging to the campus where the patient is receiving treatment. List of dedicated fax  numbers for the Facilities:  FACILITY NAME UR FAX NUMBER   ADMISSION DENIALS (Administrative/Medical Necessity) [phone]   DISCHARGE SUPPORT TEAM (NETWORK) [phone]   PARENT CHILD HEALTH (Maternity/NICU/Pediatrics) [phone]   [facility] [phone]   [facility] [phone]   [facility] [phone]   [facility] [phone]   [facility] [phone]   [facility] [phone]   [facility] [phone]   [facility] [phone]   [facility] [phone]   [facility] [phone]   [facility] [phone]   [facility] [phone]              [1]   Past Medical History:  Diagnosis Date    Anxiety     CVA (cerebral vascular accident) (HCC) 2011 - no residual 08/10/2023    Depression     ETOH abuse     40 days sober as of 8/10/23    Hematuria     Kidney stone     Nephrolithiasis with hydronephrosis 07/26/2023    Suicide attempt ([facility]) 06/13/2023

## 2025-06-02 NOTE — UTILIZATION REVIEW
Continued Stay Review    Date: 6/2                          Current Patient Class: Inpatient  Current Level of Care: Med Surg    HPI:34 y.o. male initially admitted on 5/31     Current Diagnosis: Alcohol withdrawal syndrome    Assessment/Plan:   Awaiting IP rehab placement  Continue current treatment regimen    Medications:   Scheduled Medications:  escitalopram, 20 mg, Oral, Daily  gabapentin, 600 mg, Oral, TID  lamoTRIgine, 50 mg, Oral, Daily  melatonin, 3 mg, Oral, HS  [START ON 6/3/2025] naltrexone, 50 mg, Oral, Daily  nicotine, 21 mg, Transdermal, Daily  QUEtiapine, 150 mg, Oral, HS      Continuous IV Infusions: None     PRN Meds:  acetaminophen, 650 mg, Oral, Q6H PRN  hydrOXYzine HCL, 50 mg, Oral, Q6H PRN  ondansetron, 4 mg, Intravenous, Q6H PRN      Discharge Plan: TBD    Vital Signs (last 3 days)       Date/Time Temp Pulse Resp BP MAP (mmHg) SpO2 O2 Device Patient Position - Orthostatic VS SEWS Total Score Pain    06/02/25 1052 97.5 °F (36.4 °C) 77 17 99/51 69 96 % None (Room air) Lying -- --    06/02/25 0723 98.2 °F (36.8 °C) 59 16 -- -- 96 % None (Room air) Lying -- --    06/02/25 0721 -- -- -- -- -- -- -- -- -- No Pain    06/01/25 2025 -- -- -- -- -- -- -- -- -- No Pain    06/01/25 2009 -- -- -- 109/60 -- -- -- -- -- --    06/01/25 1930 97.6 °F (36.4 °C) 78 18 107/48 -- 96 % None (Room air) Lying -- --    06/01/25 1544 97.7 °F (36.5 °C) 76 16 118/67 -- 95 % None (Room air) Lying -- --    06/01/25 1120 97.8 °F (36.6 °C) 63 16 124/65 84 92 % None (Room air) Lying -- --    06/01/25 0800 -- -- -- -- -- -- -- -- 0 --    06/01/25 0756 -- -- -- -- -- -- -- -- -- No Pain    06/01/25 0707 98.1 °F (36.7 °C) 60 16 95/51 65 94 % None (Room air) Lying -- --    06/01/25 0553 -- -- -- -- -- -- -- -- 3 --    06/01/25 0541 97.4 °F (36.3 °C) 72 16 115/63 80 95 % None (Room air) Lying -- --    06/01/25 0200 98 °F (36.7 °C) 61 16 100/66 -- 92 % None (Room air) -- 0 --    05/31/25 2240 -- -- -- -- -- -- -- -- 0 --    05/31/25  2236 97.7 °F (36.5 °C) 91 16 101/49 -- 93 % -- -- -- --    05/31/25 2151 -- -- -- -- -- 92 % None (Room air) -- -- --    05/31/25 2134 97.8 °F (36.6 °C) 84 16 120/69 86 93 % None (Room air) -- 6 No Pain          Weight (last 2 days)       Date/Time Weight    05/31/25 2134 90 (198.4)            Pertinent Labs/Diagnostic Results:   Radiology:  No orders to display     Cardiology:  No orders to display     GI:  No orders to display           Results from last 7 days   Lab Units 06/01/25  0542 05/31/25  1313   WBC Thousand/uL 6.29 11.10*   HEMOGLOBIN g/dL 13.1 15.7   HEMATOCRIT % 38.9 44.7   PLATELETS Thousands/uL 201 253   TOTAL NEUT ABS Thousands/µL  --  8.24*         Results from last 7 days   Lab Units 06/01/25  0542 05/31/25  1313   SODIUM mmol/L 141 140   POTASSIUM mmol/L 4.0 3.6   CHLORIDE mmol/L 108 105   CO2 mmol/L 27 23   ANION GAP mmol/L 6 12   BUN mg/dL 17 17   CREATININE mg/dL 1.06 1.16   EGFR ml/min/1.73sq m 89 80   CALCIUM mg/dL 8.1* 9.0   MAGNESIUM mg/dL 1.9  --      Results from last 7 days   Lab Units 06/01/25 0542 05/31/25  1313   AST U/L 16 19   ALT U/L 13 19   ALK PHOS U/L 64 86   TOTAL PROTEIN g/dL 5.9* 7.5   ALBUMIN g/dL 3.6 4.5   TOTAL BILIRUBIN mg/dL 0.64 0.50         Results from last 7 days   Lab Units 06/01/25  0542 05/31/25  1313   GLUCOSE RANDOM mg/dL 99 123       Results from last 7 days   Lab Units 05/31/25  1315   CLARITY UA  Clear   COLOR UA  Yellow   SPEC GRAV UA  1.015   PH UA  5.5   GLUCOSE UA mg/dl Negative   KETONES UA mg/dl Trace*   BLOOD UA  Negative   PROTEIN UA mg/dl Negative   NITRITE UA  Negative   BILIRUBIN UA  Negative   UROBILINOGEN UA (BE) mg/dl <2.0   LEUKOCYTES UA  Negative   WBC UA /hpf None Seen   RBC UA /hpf 0-1*   BACTERIA UA /hpf None Seen   EPITHELIAL CELLS WET PREP /hpf Occasional             Results from last 7 days   Lab Units 05/31/25  1315   AMPH/METH  Negative   BARBITURATE UR  Negative   BENZODIAZEPINE UR  Negative   COCAINE UR  Negative   METHADONE URINE   Negative   OPIATE UR  Negative   PCP UR  Negative   THC UR  Negative     Results from last 7 days   Lab Units 05/31/25  1313   ETHANOL LVL mg/dL 247*         Network Utilization Review Department  ATTENTION: Please call with any questions or concerns to [phone] and carefully listen to the prompts so that you are directed to the right person. All voicemails are confidential.   For Discharge needs, contact Care Management DC Support Team at [phone] opt. 2  Send all requests for admission clinical reviews, approved or denied determinations and any other requests to dedicated fax number below belonging to the [place] where the patient is receiving treatment. List of dedicated fax numbers for the Facilities:  FACILITY NAME UR FAX NUMBER   ADMISSION DENIALS (Administrative/Medical Necessity) [phone]   DISCHARGE SUPPORT TEAM (NETWORK) [phone]   PARENT CHILD HEALTH (Maternity/NICU/Pediatrics) [phone]   [facility] [phone]   [facility] [phone]   [facility] [phone]   [facility] [phone]   [facility] [phone]   [facility] [phone]   [facility] [phone]   [facility] [phone]   [facility] [phone]   Critical access hospital [phone]   [facility] [phone]   [facility] [phone]

## 2025-06-02 NOTE — NURSING NOTE
Patient discharged, IV removed, belongings accounted for. AVS reviewed with patient, questions answered and understanding stated. Patient ambulated to lobby with PCA for [employer].

## 2025-06-05 NOTE — SOCIAL WORK
06/06/25 1023   Substance Abuse Addendum Details   History of Withdrawal Symptoms Seizures;Other withdrawal symptoms (specify in comment)  (Depression, tearful, anxious, sore throat, abdominal pain, n/v, myalgias, fatigue)   Medical Complications Alcohol withdrawal syndrome with complication (HCC)  Anxiety and depression (Chronic)  Alcohol use disorder, severe, dependence (HCC)  Tobacco abuse (Chronic)   Sober Supports parents, sponsor, AA peers   Present Treatment none presently; [facility] to leave [facility] he [facility]   Substance Abuse Treatment Hx Past Tx, Outpatient;Past Tx, Inpatient;Past detox;Attends AA/NA   ASAM Level & Criteria 4.0; pt has a distant hx of w/d seizures; current w/d sxs of Depression, tearful, anxious, sore throat, abdominal pain, n/v, myalgias, fatigue; medical issues of Alcohol withdrawal syndrome with complication (HCC) Anxiety and depression (Chronic) Alcohol use disorder, severe, dependence (HCC) Tobacco abuse (Chronic);  dx of MDD; pt had recent relapse, was staying at [facility] and relapsed so was asked to leave and so he lost housing and PCP and psychiatrist through ; pt does have supportive parents, AA peers and sponsor, but stated he feels the  wasn't a good fit for him. He was currently receiving OP AUD services, but felt underlyinhg MH was not being addressed which he felt was the main traigger for drinking; stated his parents cared about him, but he felt they didnt understand his AUD;  risk for relapse if he returns to community is high; pt is in preparationstage of change   Stage of Change   Stage of Change Preparation     Pt is a 35 yo male who was admitted to [facility] for alcohol withdrawal. He initially presented to [facility] ED. He stated he called police as he felt he needed help. Pt's name, date of birth, home address and telephone number were verified. Pt was informed of case management role and the purpose of the completion of intake with case  management. Pt was cooperative.     SUBSTANCE ABUSE    Stressor/Trigger    Alcohol withdrawal   UDS: negative  Audit: n/a  PAWSS: 6 Nicotine: 1/2 ppd  Ethanol: 247   Prior D&A treatment   Previously on Antabuse, previous IP and OP, residing in a [facility] prior to admission   AA/NA Meetings   Pt has a sponsor and attends AA mtgs   Withdrawal  Symptoms   Depression, tearful, anxious, sore throat, abdominal pain, n/v, myalgias, fatigue   History of OD/blackouts or Withdrawal Seizures   Pt has a distant history of withdrawal seizures   MENTAL HEALTH INFORMATION    Hx of Mental Health Dx   Major Depressive Disorder   Outpatient Mental Health Tx   Pt is primarily engaged in ALEJO tx at this time. He reports he had a psychiatrist through [facility], but was asked to leave, so no longer has any services.     He had a suicide attempt in 2023   Inpatient Hospitalizations (Mental Health)   Pt had inpatient hospitalizations in the past   Family History of Mental Health    Pt's father was diagnosed with depression   Trauma History    Pt does not report any childhood abuse by his family, but stated he was bullied as a kid   Current MH Symptoms   Pt reports depression, but no SI/HI/AVH   Access to Firearms    Pt denied any access to firearms   PHYSICAL HEALTH INFORMATION    Physical Health Conditions (Chronic)   Alcohol withdrawal syndrome with complication (HCC)   Anxiety and depression (Chronic)   Alcohol use disorder, severe, dependence (HCC)   Tobacco abuse (Chronic)      PCP   Pt had a PCP through [facility], but no longer   Insurance   [facility] (Medicaid)   Preferred Pharmacy   [id number] - [place], [place] - [address] [place] [id number]   Phone: [phone] Fax: [phone]      LEGAL ISSUES    Past or present legal issues   Pt stated he had a DUI over a year ago. He denies current legal issues   Probation/[place]   Denies    EMPLOYMENT/INCOME/NEEDS    Education   Some college   Employment Pt is not currently working      History   Pt denies   Spiritual/Bahai Beliefs   [employer]   Transportation/Transport Home   Pt uses [employer]   DEMOGRAPHICS    Discharge Address   unknown    [address]  [place], [place]- parent's house   Living Arrangement   Pt was living at [facility]. He relapsed and was asked to leave.    Can pt return home No, pt is asking for referral to IP tx at [facility]     External Supports     Parents, sponsor, AA peers   Phone Number   JUSTYNA ARMSTRONG (Mother)  [phone] (Mobile)    Email   [email or web address]   Marital Status/Children   Single, no children     Substance First use Last Use Frequency Amount Used How long Longest period of sobriety and when Method of use   THC  (Hx)  2018        Heroin            Cocaine  (Hx)          ETOH   14 5/31/25 Daily  1 L vodka; also reported he drank mouthwash (70 % alcohol) 4 days 4.5 months drink   Meth            Benzos            Other:              Pt and CM completed recovery plan. Pt signed and received a copy. He stated he wanted to be sober and improve his MH as he felt his depression was the major trigger for his drinking. He listed his strengths as sobriety in the past, willing to go to treatment, he is good with people and persistent. Pt's goals for detox are to successfully complete medical withdrawal and to develop a discharge plan that includes relapse prevention education. Pt signed for ROIs for [facility] [place] (insurance) and [facility] (preferred inpt AUD tx provider). Pt requested IP referral and had already reached out to his preferred facility. Pt is in preparation stage of change.     Social Determinants     06/06/25 1045   Housing Stability   In the last 12 months, was there a time when you were not able to pay the mortgage or rent on time? Y   In the past 12 months, how many times have you moved where you  were living?   (several facilities, RHs, etc)   At any time in the past 12 months, were you homeless or living in a shelter (including now)? Y   Transportation Needs   In the past 12 months, has lack of transportation kept you from medical appointments or from getting medications? yes   In the past 12 months, has lack of transportation kept you from meetings, work, or from getting things needed for daily living? Yes   Food Insecurity   Within the past 12 months, you worried that your food would run out before you got the money to buy more. Sometimes   Within the past 12 months, the food you bought just didn't last and you didn't have money to get more. Sometimes   Social Connections   In a typical week, how many times do you talk on the phone with family, friends, or neighbors? Three   How often do you get together with friends or relatives? Once   How often do you attend Yarsanism or Temple services? Never   Do you belong to any clubs or organizations such as Yarsanism groups, unions, fraternal or athletic groups, or school groups? Yes   How often do you attend meetings of the clubs or organizations you belong to? More than 4   Are you , , , , never , or living with a partner? Never marrie   Intimate Partner Violence   Within the last year, have you been afraid of your partner or ex-partner? No   Within the last year, have you been humiliated or emotionally abused in other ways by your partner or ex-partner? No   Alcohol Use   Q1: How often do you have a drink containing alcohol? 4 or more ti   Q2: How many drinks containing alcohol do you have on a typical day when you are drinking? 10 or more   Q3: How often do you have six or more drinks on one occasion? Daily   Utilities   In the past 12 months has the electric, gas, oil, or water company threatened to shut off services in your home? No   Health Literacy   How often do you need to have someone help you when you read  instructions, pamphlets, or other written material from your doctor or pharmacy? Never   Follow-Ups   We make community resources available to all of our patients to assist with everyday needs. We may be able to connect you with those resources. Would you be interested? Y   Would you be interested in assistance with any of these areas? If so, which ones? 2;10;5

## 2025-06-06 NOTE — PROGRESS NOTES
06/06/25 1053   Other Referral/Resources/Interventions Provided:   Financial Resources Provided Indigent Transportation  ([facility] provided pt transportation upon discharge to [facility] and [facility] provided transportation to IP tx from there.)   Referrals Provided: AuntBertha;Crisis Hotline;Food Bank;Psychiatrist;Other (Specify);Support Group;Therapist;Transportation to treatment  (IP and OP AUD tx resources)   Post Acute Placement to: Substance Abuse Treatment  (Pt discharged to [facility] for IP AUD treatment)   Government Services: Other (with comments);Housing  (utility and rental assistance programs)   Discharge Communications   Discharge planning discussed with: pt   Freedom of Choice Yes   Comments - Freedom of Choice Pt chose his IP tx program, was referred and accepted   CM contacted family/caregiver? No- see comments  (pt declined to sign NARESH for family/emergency contact)   Were Treatment Team discharge recommendations reviewed with patient/caregiver? Yes   Did patient/caregiver verbalize understanding of patient care needs? Yes   Were patient/caregiver advised of the risks associated with not following Treatment Team discharge recommendations? Yes   Contacts   Patient Contacts [facility]   Relationship to Patient: Treatment Provider   Contact Method Phone   Phone Number [phone]   Reason/Outcome Continuity of Care;Referral;Discharge Planning   [facility]   Would you like to participate in our [facility] service program?   No - Declined     CM was made aware by medical provider that pt was medically stable for discharge. Pt to discharge 6/2/25. Pt denies any withdrawal symptoms at this time. Pt to discharge to [facility] and was provided with transportation upon discharge. Pt to follow up with PCP on completion of IP treatment. Pt's scripts for medications were sent as requested by rehab.    Discharge Address: [facility], [address], [place] [address]   Phone Number: (Wqthnjqo) [phone]     Pt is currently homeless, address is Epic is his parent's address which he uses for mail.     Pt Phone: [phone]

## (undated) DEVICE — GUIDEWIRE STRGHT TIP 0.038 IN SOLO PLUS

## (undated) DEVICE — CYSTOSCOPY PACK: Brand: CONVERTORS

## (undated) DEVICE — TOWEL SET X-RAY

## (undated) DEVICE — POUCH UR CATCHER STERILE

## (undated) DEVICE — STERILE SURGICAL LUBRICANT,  TUBE: Brand: SURGILUBE

## (undated) DEVICE — CATH URETERAL 5FR X 70 CM FLEX TIP POLYUR BARD

## (undated) DEVICE — GLOVE SRG BIOGEL 8

## (undated) DEVICE — SNAP KOVER: Brand: UNBRANDED

## (undated) DEVICE — SEAL BIOPSY PORT ADJUST F/ACCESSORIES UP TO 3FR

## (undated) DEVICE — CYSTO TUBING TUR Y IRRIGATION

## (undated) DEVICE — SPONGE STICK WITH PVP-I: Brand: KENDALL

## (undated) DEVICE — FABRIC REINFORCED, SURGICAL GOWN, XL: Brand: CONVERTORS

## (undated) DEVICE — FIBER STD QUANTA 272 MICRON

## (undated) DEVICE — GAUZE,SPONGE,2"X2",8PLY,STERILE,LF,2'S: Brand: MEDLINE

## (undated) DEVICE — Device

## (undated) DEVICE — BOWL: 16OZ PEELPOUCH 75/CS 16/PLT: Brand: MEDEGEN MEDICAL PRODUCTS, LLC

## (undated) DEVICE — NGAGE, NITINOL STONE EXTRACTOR: Brand: NGAGE